# Patient Record
Sex: FEMALE | Race: WHITE | NOT HISPANIC OR LATINO | Employment: PART TIME | URBAN - METROPOLITAN AREA
[De-identification: names, ages, dates, MRNs, and addresses within clinical notes are randomized per-mention and may not be internally consistent; named-entity substitution may affect disease eponyms.]

---
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
## 2022-01-09 ENCOUNTER — HEALTH MAINTENANCE LETTER (OUTPATIENT)
Age: 22
End: 2022-01-09

## 2022-02-13 ENCOUNTER — OFFICE VISIT (OUTPATIENT)
Dept: FAMILY MEDICINE | Facility: CLINIC | Age: 22
End: 2022-02-13
Payer: COMMERCIAL

## 2022-02-13 VITALS
RESPIRATION RATE: 16 BRPM | DIASTOLIC BLOOD PRESSURE: 84 MMHG | HEART RATE: 119 BPM | OXYGEN SATURATION: 98 % | TEMPERATURE: 99.2 F | SYSTOLIC BLOOD PRESSURE: 118 MMHG

## 2022-02-13 DIAGNOSIS — Z11.52 ENCOUNTER FOR SCREENING FOR COVID-19: Primary | ICD-10-CM

## 2022-02-13 DIAGNOSIS — J02.0 STREP THROAT: ICD-10-CM

## 2022-02-13 DIAGNOSIS — R07.0 THROAT PAIN: ICD-10-CM

## 2022-02-13 DIAGNOSIS — G43.009 MIGRAINE WITHOUT AURA AND WITHOUT STATUS MIGRAINOSUS, NOT INTRACTABLE: ICD-10-CM

## 2022-02-13 LAB — DEPRECATED S PYO AG THROAT QL EIA: POSITIVE

## 2022-02-13 PROCEDURE — 87880 STREP A ASSAY W/OPTIC: CPT | Performed by: PHYSICIAN ASSISTANT

## 2022-02-13 PROCEDURE — U0003 INFECTIOUS AGENT DETECTION BY NUCLEIC ACID (DNA OR RNA); SEVERE ACUTE RESPIRATORY SYNDROME CORONAVIRUS 2 (SARS-COV-2) (CORONAVIRUS DISEASE [COVID-19]), AMPLIFIED PROBE TECHNIQUE, MAKING USE OF HIGH THROUGHPUT TECHNOLOGIES AS DESCRIBED BY CMS-2020-01-R: HCPCS | Performed by: PHYSICIAN ASSISTANT

## 2022-02-13 PROCEDURE — U0005 INFEC AGEN DETEC AMPLI PROBE: HCPCS | Performed by: PHYSICIAN ASSISTANT

## 2022-02-13 PROCEDURE — 99214 OFFICE O/P EST MOD 30 MIN: CPT | Mod: 25 | Performed by: PHYSICIAN ASSISTANT

## 2022-02-13 PROCEDURE — 96372 THER/PROPH/DIAG INJ SC/IM: CPT | Mod: 59 | Performed by: PHYSICIAN ASSISTANT

## 2022-02-13 RX ORDER — PENICILLIN V POTASSIUM 500 MG/1
500 TABLET, FILM COATED ORAL 2 TIMES DAILY
Qty: 20 TABLET | Refills: 0 | Status: SHIPPED | OUTPATIENT
Start: 2022-02-13 | End: 2022-02-13

## 2022-02-13 RX ORDER — KETOROLAC TROMETHAMINE 30 MG/ML
30 INJECTION, SOLUTION INTRAMUSCULAR; INTRAVENOUS ONCE
Status: COMPLETED | OUTPATIENT
Start: 2022-02-13 | End: 2022-02-13

## 2022-02-13 RX ORDER — DIPHENHYDRAMINE HYDROCHLORIDE 50 MG/ML
50 INJECTION INTRAMUSCULAR; INTRAVENOUS ONCE
Status: COMPLETED | OUTPATIENT
Start: 2022-02-13 | End: 2022-02-13

## 2022-02-13 RX ADMIN — KETOROLAC TROMETHAMINE 30 MG: 30 INJECTION, SOLUTION INTRAMUSCULAR; INTRAVENOUS at 17:24

## 2022-02-13 RX ADMIN — DIPHENHYDRAMINE HYDROCHLORIDE 50 MG: 50 INJECTION INTRAMUSCULAR; INTRAVENOUS at 17:25

## 2022-02-13 NOTE — PATIENT INSTRUCTIONS
Suggested increased rest increased fluids and bedside humidification  Over-the-counter Tylenol for comfort.  Follow packaging directions  Over-the-counter throat lozenges with benzocaine such as Cepacol may be used if indicated and is not a choking hazard based on age.  Follow packaging directions.  Do not overuse the benzocaine as it will dry the throat and make it uncomfortable.  Noncontagious after 24 hours on the antibiotic.  Change toothbrush out after 48 hours to avoid reinfecting the mouth.  Follow-up after completion of the antibiotic if any consultation or sequela.      Self-Care for Sore Throats  Sore throats happen for many reasons, such as colds, allergies, and infections caused by viruses or bacteria. In any case, your throat becomes red and sore. Your goal for self-care is to reduce your discomfort while giving your throat a chance to heal.    Moisten and soothe your throat  Tips include the following:    Try a sip of water first thing after waking up.    Keep your throat moist by drinking 6 or more glasses of clear liquids every day.    Run a cool-air humidifier in your room overnight.    Avoid cigarette smoke.     Suck on throat lozenges, cough drops, hard candy, ice chips, or frozen fruit-juice bars. Use the sugar-free versions if your diet or medical condition requires them.  Gargle to ease irritation  Gargling every hour or 2 can ease irritation. Try gargling with 1 of these solutions:    1/4 teaspoon of salt in 1/2 cup of warm water    An over-the-counter anesthetic gargle  Use medicine for more relief  Over-the-counter medicine can reduce sore throat symptoms. Ask your pharmacist if you have questions about which medicine to use:    Ease pain with anesthetic sprays. Aspirin or an aspirin substitute also helps. Remember, never give aspirin to anyone 18 or younger, or if you are already taking blood thinners.     For sore throats caused by allergies, try antihistamines to block the allergic  reaction.    Remember: unless a sore throat is caused by a bacterial infection, antibiotics won t help you.  Prevent future sore throats  Prevention tips include the following:    Stop smoking or reduce contact with secondhand smoke. Smoke irritates the tender throat lining.    Limit contact with pets and with allergy-causing substances, such as pollen and mold.    When you re around someone with a sore throat or cold, wash your hands often to keep viruses or bacteria from spreading.    Don t strain your vocal cords.  Call your healthcare provider  Contact your healthcare provider if you have:    A temperature over 101 F (38.3 C)    White spots on the throat    Great difficulty swallowing    Trouble breathing    A skin rash    Recent exposure to someone else with strep bacteria    Severe hoarseness and swollen glands in the neck or jaw   Date Last Reviewed: 8/1/2016 2000-2016 [employer]. [address] [place] [address]. All rights reserved. This information is not intended as a substitute for professional medical care. Always follow your healthcare professional's instructions.        Patient Education     Preventing Migraine Headaches: Medicines and Lifestyle Changes      Going to bed and getting up at the same time each day, including weekends, may help prevent migraines.   A migraine is a type of severe headache. Having a migraine can be very painful. But there are steps you can take to help prevent migraines.   Medicines to help prevent migraines     Your healthcare provider may prescribe certain medicines to help prevent migraines. These medicines may need to be taken daily. Or they may only need to be taken at times when you re likely to have a migraine.    Common medicines used to help prevent migraines include:  ? Triptans (serotonin receptor agonists)  ? Nonsteroidal anti-inflammatory drugs (such as ibuprofen, available  over-the-counter)  ? Beta-blockers  ? Anticonvulsants  ? Tricyclic antidepressants  ? Calcium channel blockers  ? Certain vitamins, minerals, and plant extracts  ? Botulinum toxin injection for certain chronic migraines   ? CGRP (calcitonin gene-related peptide) agnonists are being reviewed by the Food and Drug Administration (FDA)    Lifestyle changes for long-term prevention   Here are some suggestions:    Exercise. Regular exercise can help prevent migraines and improve your health. (If exercise triggers your migraines, talk to your healthcare provider.)    Keep regular habits. Don t skip or delay meals. Drink plenty of water. And go to bed and get up at about the same time each day. This includes weekends.    Try alternative treatments. These are treatments that don't involve the use of medicines or surgery. They may help relieve symptoms and prevent migraines. Some treatment options include biofeedback and acupuncture. Ask your healthcare provider to tell you more about these treatments if you have questions.    Limit caffeine. You may find that caffeine helps relieve pain during an attack. But too much caffeine can also trigger migraines. So, limit the amount of caffeine you consume.  [employer] last reviewed this educational content on 5/1/2018 2000-2021 [employer]. All rights reserved. This information is not intended as a substitute for professional medical care. Always follow your healthcare professional's instructions.           Patient Education     Preventing Migraine Headaches: Triggers  The first step in preventing migraines is to learn what triggers them. You may then be able to control your triggers to avoid or reduce the severity of your migraines.   Know your triggers  Be aware that you may have more than one trigger, and that some triggers may work together. Common migraine triggers include:     Food and nutrition. Skipping meals or not drinking enough water can trigger headaches.  So can certain foods, such as caffeine, chocolate, artificial sweeteners, monosodium glutamate (MSG), aged cheese, or sausage.    Alcohol. Red wine and other alcoholic beverages are common migraine triggers.    Chemicals. Scents, cleaning products, gasoline, glue, perfume, and paint can be triggers. So can tobacco smoke, including secondhand smoke.    Emotions. Stress can trigger headaches or make them worse once they start.    Sleep disruption. Staying up late, sleeping late, and traveling across time zones can disrupt your sleep cycle, triggering headaches.    Hormones. Many women notice that migraines tend to happen at a certain point in their menstrual cycle. Birth control pills or hormone replacement therapy may also trigger migraines.    Environment and weather. Air travel, changes in altitude, air pressure changes, hot sun, or bright or flashing lights can be triggers.    Medicine overuse. Frequent use of pain medicines for headache pain can also cause a headache. This may also be called rebound headache.  Control your triggers  These are some of the things you can do to try to control triggers:    Avoid triggers if you can. For example, stay clear of alcohol and foods that trigger your headaches. Use unscented household products. Keep regular sleep habits. Manage stress to help control emotional triggers.    Change your behavior at times when triggers can't be avoided. For example, make sure to get enough rest and drink plenty of water while you're traveling. Make sure to carry a hat, sunglasses, and your medicines. Be alert for migraine symptoms, so you can treat a migraine early if it happens.  [employer] last reviewed this educational content on 5/1/2018 2000-2021 [employer]. All rights reserved. This information is not intended as a substitute for professional medical care. Always follow your healthcare professional's instructions.           Patient Education     Migraine Headache: Stages  "and Treatment    A migraine headache tends to progress in stages. Learning these stages can help you better understand what is happening. Then you can learn ways to reduce pain and relieve other symptoms. Methods for relieving your symptoms include self-care and medicines.   Migraine stages  Migraines tend to progress through 4 stages. Many people don't have all stages, and stages may differ with each headache:     Prodrome. A few hours to a day or so before the headache, you may feel tired, (yawning many times), uneasy, or sams. You may also feel bloated or crave certain foods.    Aura. Up to an hour before the headache starts, some migraine sufferers experience aura--flashing lights, blind spots, other vision problems, confusion, difficulty speaking, or other neurologic symptoms.    Headache. Moderate to severe pain affects one side of the head and then can spread to both sides, often along with nausea. You may be highly sensitive to light, sound, and odors. Vomiting or diarrhea may also happen. This stage lasts 4 to 72 hours.    Postdrome. After your headache ends, you may feel tired, achy, and \"washed out.\" This may last for a day or so.  Self-care during a migraine  Here is what you can do:    Use a cold compress. Wrap a thin cloth around a cold pack, a cold can of soda, or a bag of frozen vegetables. Apply this to your temple or other pain site.    Drink fluids. If nausea makes it hard to drink, try sucking on ice.    Rest. If possible, lie down. Try not to bend over, as this may increase your pain. Sometimes laying in a dark quiet room can help the migraine from being aggravated.      Try caffeine. Some people find that drinking fluids with caffeine, such as coffee or tea, helps to lessen migraine pain.  Using medicines  Work with your healthcare provider to find the right medicines for you. Medicines for migraine may relieve pain (analgesics), relieve nausea, or attack the migraine's root causes " (migraine-specific medicines).   Rebound headache  Taking analgesics each day, or even several times a week, may lead to more frequent and severe headaches. These are called rebound headaches. If you think you're having rebound headaches, tell your healthcare provider. He or she can help you safely decrease your medicine. Rebound caffeine withdrawal headaches can also happen. Certain medicines are addictive and can cause rebound headaches when discontinued abruptly.   [employer] last reviewed this educational content on 5/1/2018 2000-2021 [employer]. All rights reserved. This information is not intended as a substitute for professional medical care. Always follow your healthcare professional's instructions.           Patient Education   About Migraine Headaches  What is a migraine headache?  A migraine is a very painful type of headache. It may last a few hours or days. During a migraine, you may have vision problems and feel sick to your stomach.  Migraines are three times more common in women than in men. Once they start, you may get them for the rest of your life. They may occur less often as you age.  What causes it?  We don't know the exact cause, but many things can trigger a migraine. These include:    Stress and anxiety    Lack of food or sleep    Foods and drinks that contain tyramine, such as:  ? Red steven and some beers  ? Aged cheeses (like cheddar or blue cheese)  ? Yeast  ? Aged, dried or cured meats  ? Fermented foods like sauerkraut, soy sauce, miso and linda chi    Too much light    Chemicals (gasoline, cleaning products, perfume, glue, etc.)    Weather changes    Certain medicines    Hormone changes (in women).  What are symptoms?  Some people can tell when they're about to have a migraine. They may see flashing lights or zigzag lines in front of their eyes. Or they may lose their vision for a short time.  With a migraine, you may:    Feel pain or pulsing on one side of the head. For  some people, the entire head hurts.    Be very sensitive to light and sound.    Feel nauseated (sick to your stomach) and vomit (throw up).  How is it treated?  Your care team may suggest medicine to prevent or relieve your symptoms. Once you start having migraines, you may also need medicine to keep them from getting worse.   Take your medicine at the first sign of a migraine. It may take several tries to find a medicine that works for you.   When a migraine comes:    Lie down in a quiet, dark room. Try not to bend over, as this may cause more pain.    Put a cold pack on your head. Try a bag of frozen vegetables, wrapped with a thin cloth.    Drink extra fluids. If you can't drink, suck on ice chips.  How can I prevent migraines?  It will help to keep a migraine diary. By keeping a diary, you may find the cause of your headaches. Once you know the cause, you can take steps to prevent migraines in the future.  It also helps to live a healthy lifestyle. This means:    Get regular exercise. (If exercise triggers your headaches, tell your doctor.)    Drink plenty of water.    Eat healthy meals at regular times.    Try to go to bed and get up and regular times.    Don't smoke. Avoid second-hand smoke.    Avoid caffeine. Coffee, tea and soda may help a migraine. But if you drink them too often, they can cause migraines.    Find ways to relax, have fun and reduce stress in your life.    Try complementary therapies (yoga, acupuncture, massage, biofeedback, etc.).  When should I call my clinic?  Call your clinic at once if you have new or unusual symptoms, such as:     Pain that gets worse or lasts more than 24 hours.    Slurred speech or problems talking.    A weak arm or leg that you can't move normally.    A fever over 100 F (37.8 C), taken under the tongue.    Stiff neck.    Vomiting (throwing up) for several hours.  For more information about migraines  Contact the [employer] [place] [employer] Headache Education (ACHE) at  2-156-118-3614 or www.headaches.org.  Local providers of complementary therapies  These services may not be covered by insurance. Check your insurance plan.  [facility]  [phone]   Includes pain education, behavioral therapy,   trigger point injections and more.  [facility]   [phone]   Includes acupuncture, massage, myofascial release.  [facility]  [phone]  [email or web address]  Includes mindfulness, meditation, yoga.  Community Education     Ideal: [email or web address]: [email or web address]  Look for programs on yoga, mindfulness, etc.  [facility]   [phone]  www[email or web address]  Includes mindfulness, yoga, body-mind skills.  For informational purposes only. Not to replace the advice of your health care provider.   Copyright   2011 [facility]. All rights reserved. [employer] 888811 - 11/15.

## 2022-02-13 NOTE — PROGRESS NOTES
Patient presents with:  Pharyngitis: x2d  Migraine: x2wks      Clinical Decision Making:  Strep test was obtained and was positive.  COVID-19 screening test is pending.  Symptomatic care was gone over.  Patient and mother requested pen G IM treatment for the strep throat based on the last treatment for strep throat at the emergency room in September 2021.  Patient was also treated for Benadryl 50 mg IM and Toradol 30 mg IM for treatment for Migraine. I had a conversation with mother and patient stating that the headache can also come from a con commitment strep infection.  Expected course of resolution and indication for return was gone over and questions were answered to patient/parent's satisfaction before discharge.        ICD-10-CM    1. Encounter for screening for COVID-19  Z11.52 Symptomatic; Yes; 2/12/2022 COVID-19 Virus (Coronavirus) by PCR Nose   2. Throat pain  R07.0 Streptococcus A Rapid Screen w/Reflex to PCR - Clinic Collect   3. Strep throat  J02.0 penicillin G benzathine (BICILLIN L-A) injection 1.2 Million Units     DISCONTINUED: penicillin V (VEETID) 500 MG tablet   4. Migraine without aura and without status migrainosus, not intractable  G43.009 diphenhydrAMINE (BENADRYL) injection 50 mg     ketorolac (TORADOL) injection 30 mg       Patient Instructions       Suggested increased rest increased fluids and bedside humidification  Over-the-counter Tylenol for comfort.  Follow packaging directions  Over-the-counter throat lozenges with benzocaine such as Cepacol may be used if indicated and is not a choking hazard based on age.  Follow packaging directions.  Do not overuse the benzocaine as it will dry the throat and make it uncomfortable.  Noncontagious after 24 hours on the antibiotic.  Change toothbrush out after 48 hours to avoid reinfecting the mouth.  Follow-up after completion of the antibiotic if any consultation or sequela.      Self-Care for Sore Throats  Sore throats happen for many reasons,  such as colds, allergies, and infections caused by viruses or bacteria. In any case, your throat becomes red and sore. Your goal for self-care is to reduce your discomfort while giving your throat a chance to heal.    Moisten and soothe your throat  Tips include the following:    Try a sip of water first thing after waking up.    Keep your throat moist by drinking 6 or more glasses of clear liquids every day.    Run a cool-air humidifier in your room overnight.    Avoid cigarette smoke.     Suck on throat lozenges, cough drops, hard candy, ice chips, or frozen fruit-juice bars. Use the sugar-free versions if your diet or medical condition requires them.  Gargle to ease irritation  Gargling every hour or 2 can ease irritation. Try gargling with 1 of these solutions:    1/4 teaspoon of salt in 1/2 cup of warm water    An over-the-counter anesthetic gargle  Use medicine for more relief  Over-the-counter medicine can reduce sore throat symptoms. Ask your pharmacist if you have questions about which medicine to use:    Ease pain with anesthetic sprays. Aspirin or an aspirin substitute also helps. Remember, never give aspirin to anyone 18 or younger, or if you are already taking blood thinners.     For sore throats caused by allergies, try antihistamines to block the allergic reaction.    Remember: unless a sore throat is caused by a bacterial infection, antibiotics won t help you.  Prevent future sore throats  Prevention tips include the following:    Stop smoking or reduce contact with secondhand smoke. Smoke irritates the tender throat lining.    Limit contact with pets and with allergy-causing substances, such as pollen and mold.    When you re around someone with a sore throat or cold, wash your hands often to keep viruses or bacteria from spreading.    Don t strain your vocal cords.  Call your healthcare provider  Contact your healthcare provider if you have:    A temperature over 101 F (38.3 C)    White spots on  the throat    Great difficulty swallowing    Trouble breathing    A skin rash    Recent exposure to someone else with strep bacteria    Severe hoarseness and swollen glands in the neck or jaw   Date Last Reviewed: 8/1/2016 2000-2016 [employer] [address] [place] [address], [place] [address]. All rights reserved. This information is not intended as a substitute for professional medical care. Always follow your healthcare professional's instructions.        Patient Education     Preventing Migraine Headaches: Medicines and Lifestyle Changes      Going to bed and getting up at the same time each day, including weekends, may help prevent migraines.   A migraine is a type of severe headache. Having a migraine can be very painful. But there are steps you can take to help prevent migraines.   Medicines to help prevent migraines     Your healthcare provider may prescribe certain medicines to help prevent migraines. These medicines may need to be taken daily. Or they may only need to be taken at times when you re likely to have a migraine.    Common medicines used to help prevent migraines include:  ? Triptans (serotonin receptor agonists)  ? Nonsteroidal anti-inflammatory drugs (such as ibuprofen, available over-the-counter)  ? Beta-blockers  ? Anticonvulsants  ? Tricyclic antidepressants  ? Calcium channel blockers  ? Certain vitamins, minerals, and plant extracts  ? Botulinum toxin injection for certain chronic migraines   ? CGRP (calcitonin gene-related peptide) agnonists are being reviewed by the Food and Drug Administration (FDA)    Lifestyle changes for long-term prevention   Here are some suggestions:    Exercise. Regular exercise can help prevent migraines and improve your health. (If exercise triggers your migraines, talk to your healthcare provider.)    Keep regular habits. Don t skip or delay meals. Drink plenty of water. And go to bed and get up at about the same time each day. This includes  weekends.    Try alternative treatments. These are treatments that don't involve the use of medicines or surgery. They may help relieve symptoms and prevent migraines. Some treatment options include biofeedback and acupuncture. Ask your healthcare provider to tell you more about these treatments if you have questions.    Limit caffeine. You may find that caffeine helps relieve pain during an attack. But too much caffeine can also trigger migraines. So, limit the amount of caffeine you consume.  [employer] last reviewed this educational content on 5/1/2018 2000-2021 [employer]. All rights reserved. This information is not intended as a substitute for professional medical care. Always follow your healthcare professional's instructions.           Patient Education     Preventing Migraine Headaches: Triggers  The first step in preventing migraines is to learn what triggers them. You may then be able to control your triggers to avoid or reduce the severity of your migraines.   Know your triggers  Be aware that you may have more than one trigger, and that some triggers may work together. Common migraine triggers include:     Food and nutrition. Skipping meals or not drinking enough water can trigger headaches. So can certain foods, such as caffeine, chocolate, artificial sweeteners, monosodium glutamate (MSG), aged cheese, or sausage.    Alcohol. Red wine and other alcoholic beverages are common migraine triggers.    Chemicals. Scents, cleaning products, gasoline, glue, perfume, and paint can be triggers. So can tobacco smoke, including secondhand smoke.    Emotions. Stress can trigger headaches or make them worse once they start.    Sleep disruption. Staying up late, sleeping late, and traveling across time zones can disrupt your sleep cycle, triggering headaches.    Hormones. Many women notice that migraines tend to happen at a certain point in their menstrual cycle. Birth control pills or hormone  replacement therapy may also trigger migraines.    Environment and weather. Air travel, changes in altitude, air pressure changes, hot sun, or bright or flashing lights can be triggers.    Medicine overuse. Frequent use of pain medicines for headache pain can also cause a headache. This may also be called rebound headache.  Control your triggers  These are some of the things you can do to try to control triggers:    Avoid triggers if you can. For example, stay clear of alcohol and foods that trigger your headaches. Use unscented household products. Keep regular sleep habits. Manage stress to help control emotional triggers.    Change your behavior at times when triggers can't be avoided. For example, make sure to get enough rest and drink plenty of water while you're traveling. Make sure to carry a hat, sunglasses, and your medicines. Be alert for migraine symptoms, so you can treat a migraine early if it happens.  [employer] last reviewed this educational content on 5/1/2018 2000-2021 [employer]. All rights reserved. This information is not intended as a substitute for professional medical care. Always follow your healthcare professional's instructions.           Patient Education     Migraine Headache: Stages and Treatment    A migraine headache tends to progress in stages. Learning these stages can help you better understand what is happening. Then you can learn ways to reduce pain and relieve other symptoms. Methods for relieving your symptoms include self-care and medicines.   Migraine stages  Migraines tend to progress through 4 stages. Many people don't have all stages, and stages may differ with each headache:     Prodrome. A few hours to a day or so before the headache, you may feel tired, (yawning many times), uneasy, or sams. You may also feel bloated or crave certain foods.    Aura. Up to an hour before the headache starts, some migraine sufferers experience aura--flashing lights, blind  "spots, other vision problems, confusion, difficulty speaking, or other neurologic symptoms.    Headache. Moderate to severe pain affects one side of the head and then can spread to both sides, often along with nausea. You may be highly sensitive to light, sound, and odors. Vomiting or diarrhea may also happen. This stage lasts 4 to 72 hours.    Postdrome. After your headache ends, you may feel tired, achy, and \"washed out.\" This may last for a day or so.  Self-care during a migraine  Here is what you can do:    Use a cold compress. Wrap a thin cloth around a cold pack, a cold can of soda, or a bag of frozen vegetables. Apply this to your temple or other pain site.    Drink fluids. If nausea makes it hard to drink, try sucking on ice.    Rest. If possible, lie down. Try not to bend over, as this may increase your pain. Sometimes laying in a dark quiet room can help the migraine from being aggravated.      Try caffeine. Some people find that drinking fluids with caffeine, such as coffee or tea, helps to lessen migraine pain.  Using medicines  Work with your healthcare provider to find the right medicines for you. Medicines for migraine may relieve pain (analgesics), relieve nausea, or attack the migraine's root causes (migraine-specific medicines).   Rebound headache  Taking analgesics each day, or even several times a week, may lead to more frequent and severe headaches. These are called rebound headaches. If you think you're having rebound headaches, tell your healthcare provider. He or she can help you safely decrease your medicine. Rebound caffeine withdrawal headaches can also happen. Certain medicines are addictive and can cause rebound headaches when discontinued abruptly.   [employer] last reviewed this educational content on 5/1/2018 2000-2021 [employer]. All rights reserved. This information is not intended as a substitute for professional medical care. Always follow your healthcare " professional's instructions.           Patient Education   About Migraine Headaches  What is a migraine headache?  A migraine is a very painful type of headache. It may last a few hours or days. During a migraine, you may have vision problems and feel sick to your stomach.  Migraines are three times more common in women than in men. Once they start, you may get them for the rest of your life. They may occur less often as you age.  What causes it?  We don't know the exact cause, but many things can trigger a migraine. These include:    Stress and anxiety    Lack of food or sleep    Foods and drinks that contain tyramine, such as:  ? Red steven and some beers  ? Aged cheeses (like cheddar or blue cheese)  ? Yeast  ? Aged, dried or cured meats  ? Fermented foods like sauerkraut, soy sauce, miso and linda chi    Too much light    Chemicals (gasoline, cleaning products, perfume, glue, etc.)    Weather changes    Certain medicines    Hormone changes (in women).  What are symptoms?  Some people can tell when they're about to have a migraine. They may see flashing lights or zigzag lines in front of their eyes. Or they may lose their vision for a short time.  With a migraine, you may:    Feel pain or pulsing on one side of the head. For some people, the entire head hurts.    Be very sensitive to light and sound.    Feel nauseated (sick to your stomach) and vomit (throw up).  How is it treated?  Your care team may suggest medicine to prevent or relieve your symptoms. Once you start having migraines, you may also need medicine to keep them from getting worse.   Take your medicine at the first sign of a migraine. It may take several tries to find a medicine that works for you.   When a migraine comes:    Lie down in a quiet, dark room. Try not to bend over, as this may cause more pain.    Put a cold pack on your head. Try a bag of frozen vegetables, wrapped with a thin cloth.    Drink extra fluids. If you can't drink, suck on ice  chips.  How can I prevent migraines?  It will help to keep a migraine diary. By keeping a diary, you may find the cause of your headaches. Once you know the cause, you can take steps to prevent migraines in the future.  It also helps to live a healthy lifestyle. This means:    Get regular exercise. (If exercise triggers your headaches, tell your doctor.)    Drink plenty of water.    Eat healthy meals at regular times.    Try to go to bed and get up and regular times.    Don't smoke. Avoid second-hand smoke.    Avoid caffeine. Coffee, tea and soda may help a migraine. But if you drink them too often, they can cause migraines.    Find ways to relax, have fun and reduce stress in your life.    Try complementary therapies (yoga, acupuncture, massage, biofeedback, etc.).  When should I call my clinic?  Call your clinic at once if you have new or unusual symptoms, such as:     Pain that gets worse or lasts more than 24 hours.    Slurred speech or problems talking.    A weak arm or leg that you can't move normally.    A fever over 100 F (37.8 C), taken under the tongue.    Stiff neck.    Vomiting (throwing up) for several hours.  For more information about migraines  Contact the [employer] [place] [employer] Education (ACHE) at 1-152.879.7988 or www.headaches.org.  Local providers of complementary therapies  These services may not be covered by insurance. Check your insurance plan.  [facility]  [phone]   Includes pain education, behavioral therapy,   trigger point injections and more.  [facility]   [phone]   Includes acupuncture, massage, myofascial release.  [facility]  [phone]  [email or web address]  Includes mindfulness, meditation, yoga.  Community Education     [place]: [email or web address]: [email or web address]  Look for programs on yoga, mindfulness, etc.  [facility]   [phone]  [email or web address]  Includes mindfulness, yoga, body-mind skills.  For informational purposes only. Not to replace the advice of your health care provider.   Copyright   2011 [facility]. All rights reserved. [employer] 251918 - 11/15.           HPI:  Mary Bueno is a 21 year old female who presents today accompanied by mother with 2 concerns.  First concern is that she has had a migraine that has been intermittent over the last 2 weeks.  This is her usual customary migraine she does not have prodrome flashing lights scintillating scotoma or light sensitivity but she does have some sound sensitivity.  No overt vomiting or diarrhea but has had some nauseousness.  Patient has pain that is a 7 out of 10 and constant.  Denies balance deficits weakness but has had some fatigue.     Second concern is that she has sore throat odynophagia over the last 2 days.  She has not had fever or known sick contacts for strep or for Covid.  Other and child are requesting Covid screening.    History obtained from mother, chart review and the patient.    Problem List:  2021-06: Breakthrough bleeding on Nexplanon  2021-06: Endometrial thickening on ultrasound  2020-09: Nexplanon insertion  2019-05: Migraine without aura and without status migrainosus, not   intractable  2017-05: Increased insulin level  2016-07: Current mild episode of major depressive disorder without   prior episode (H)  2016-07: Gastroesophageal reflux disease, esophagitis presence not   specified  2016-07: Depression with anxiety  2014-05: Obesity  2013-09: Adjustment disorder with mixed anxiety and depressed mood  2013-05: Health Care Home  2012-09: Intermittent asthma  2012-06: Weight disorder  2011-09: Recurrent UTI      Past Medical History:   Diagnosis Date     Migraines      Uncomplicated asthma        Social History     Tobacco Use     Smoking status: Never Smoker     Smokeless tobacco: Never  Used   Substance Use Topics     Alcohol use: No     Alcohol/week: 0.0 standard drinks       Review of Systems  As above in HPI otherwise negative.    Vitals:    02/13/22 1631 02/13/22 1717   BP: 118/84    Pulse: (!) 125 119   Resp: 16    Temp: 99.2  F (37.3  C)    TempSrc: Oral    SpO2: 98%        General: Patient is resting comfortably no acute distress is afebrile  HEENT: Head is normocephalic atraumatic   eyes are PERRL EOMI sclera anicteric   TMs are clear bilaterally  Throat is with mild pharyngeal wall erythema and 3+ tonsils uvula is midline.  No cervical lymphadenopathy present  LUNGS: Clear to auscultation bilaterally  HEART: Regular rate and rhythm  Skin: Without rash non-diaphoretic  Neuro: Annual nerves II through XII are grossly intact.  There is both direct and consensual stimulation to light.  No noted nystagmus.  Romberg is negative.  Finger-to-nose testing is intact patient has good strength 5-5 equal bilaterally in upper lower extremities.  She is alert and oriented x 3.     Physical Exam      Labs:  Results for orders placed or performed in visit on 02/13/22   Streptococcus A Rapid Screen w/Reflex to PCR - Clinic Collect     Status: Abnormal    Specimen: Throat; Swab   Result Value Ref Range    Group A Strep antigen Positive (A) Negative     COVID-19 is pending.    At the end of the encounter, I discussed results, diagnosis, medications. Discussed red flags for immediate return to clinic/ER, as well as indications for follow up if no improvement. Patient understood and agreed to plan. Patient was stable for discharge.

## 2022-02-14 LAB — SARS-COV-2 RNA RESP QL NAA+PROBE: NEGATIVE

## 2022-02-15 ENCOUNTER — OFFICE VISIT (OUTPATIENT)
Dept: FAMILY MEDICINE | Facility: CLINIC | Age: 22
End: 2022-02-15
Payer: COMMERCIAL

## 2022-02-15 VITALS
HEART RATE: 110 BPM | SYSTOLIC BLOOD PRESSURE: 120 MMHG | OXYGEN SATURATION: 99 % | BODY MASS INDEX: 41.65 KG/M2 | WEIGHT: 250 LBS | DIASTOLIC BLOOD PRESSURE: 80 MMHG | TEMPERATURE: 99.7 F | HEIGHT: 65 IN

## 2022-02-15 DIAGNOSIS — L70.8 OTHER ACNE: ICD-10-CM

## 2022-02-15 DIAGNOSIS — G43.009 MIGRAINE WITHOUT AURA AND WITHOUT STATUS MIGRAINOSUS, NOT INTRACTABLE: ICD-10-CM

## 2022-02-15 DIAGNOSIS — K21.9 GASTROESOPHAGEAL REFLUX DISEASE WITHOUT ESOPHAGITIS: ICD-10-CM

## 2022-02-15 DIAGNOSIS — J35.1 TONSILLAR HYPERTROPHY: ICD-10-CM

## 2022-02-15 DIAGNOSIS — Z11.3 ROUTINE SCREENING FOR STI (SEXUALLY TRANSMITTED INFECTION): Primary | ICD-10-CM

## 2022-02-15 DIAGNOSIS — Z11.59 NEED FOR HEPATITIS C SCREENING TEST: ICD-10-CM

## 2022-02-15 DIAGNOSIS — B37.31 YEAST INFECTION OF THE VAGINA: ICD-10-CM

## 2022-02-15 DIAGNOSIS — E55.9 VITAMIN D DEFICIENCY: ICD-10-CM

## 2022-02-15 DIAGNOSIS — Z11.4 SCREENING FOR HIV (HUMAN IMMUNODEFICIENCY VIRUS): ICD-10-CM

## 2022-02-15 DIAGNOSIS — L85.8 KERATOSIS PILARIS: ICD-10-CM

## 2022-02-15 LAB
ANION GAP SERPL CALCULATED.3IONS-SCNC: 3 MMOL/L (ref 3–14)
BUN SERPL-MCNC: 8 MG/DL (ref 7–30)
CALCIUM SERPL-MCNC: 9.2 MG/DL (ref 8.5–10.1)
CHLORIDE BLD-SCNC: 105 MMOL/L (ref 96–110)
CO2 SERPL-SCNC: 30 MMOL/L (ref 20–32)
CREAT SERPL-MCNC: 0.5 MG/DL (ref 0.5–1)
GFR SERPL CREATININE-BSD FRML MDRD: >90 ML/MIN/1.73M2
GLUCOSE BLD-MCNC: 99 MG/DL (ref 70–99)
POTASSIUM BLD-SCNC: 3.8 MMOL/L (ref 3.4–5.3)
SODIUM SERPL-SCNC: 138 MMOL/L (ref 133–144)

## 2022-02-15 PROCEDURE — 86780 TREPONEMA PALLIDUM: CPT | Performed by: FAMILY MEDICINE

## 2022-02-15 PROCEDURE — 87389 HIV-1 AG W/HIV-1&-2 AB AG IA: CPT | Performed by: FAMILY MEDICINE

## 2022-02-15 PROCEDURE — 80048 BASIC METABOLIC PNL TOTAL CA: CPT | Performed by: FAMILY MEDICINE

## 2022-02-15 PROCEDURE — 99214 OFFICE O/P EST MOD 30 MIN: CPT | Performed by: FAMILY MEDICINE

## 2022-02-15 PROCEDURE — 82306 VITAMIN D 25 HYDROXY: CPT | Performed by: FAMILY MEDICINE

## 2022-02-15 PROCEDURE — 86803 HEPATITIS C AB TEST: CPT | Performed by: FAMILY MEDICINE

## 2022-02-15 PROCEDURE — 87591 N.GONORRHOEAE DNA AMP PROB: CPT | Performed by: FAMILY MEDICINE

## 2022-02-15 PROCEDURE — 87491 CHLMYD TRACH DNA AMP PROBE: CPT | Performed by: FAMILY MEDICINE

## 2022-02-15 PROCEDURE — 36415 COLL VENOUS BLD VENIPUNCTURE: CPT | Performed by: FAMILY MEDICINE

## 2022-02-15 RX ORDER — FLUCONAZOLE 150 MG/1
150 TABLET ORAL
Qty: 3 TABLET | Refills: 3 | Status: SHIPPED | OUTPATIENT
Start: 2022-02-15 | End: 2022-05-03

## 2022-02-15 RX ORDER — TRIAMCINOLONE ACETONIDE 1 MG/G
CREAM TOPICAL 2 TIMES DAILY
Qty: 30 G | Refills: 3 | Status: SHIPPED | OUTPATIENT
Start: 2022-02-15 | End: 2022-12-30

## 2022-02-15 RX ORDER — HYDROMORPHONE HYDROCHLORIDE 2 MG/1
2 TABLET ORAL EVERY 6 HOURS PRN
Qty: 5 TABLET | Refills: 0 | Status: SHIPPED | OUTPATIENT
Start: 2022-02-15 | End: 2022-02-17

## 2022-02-15 RX ORDER — PANTOPRAZOLE SODIUM 40 MG/1
40 TABLET, DELAYED RELEASE ORAL DAILY
Qty: 90 TABLET | Refills: 3 | Status: SHIPPED | OUTPATIENT
Start: 2022-02-15 | End: 2023-02-09

## 2022-02-15 RX ORDER — CLINDAMYCIN AND BENZOYL PEROXIDE 10; 50 MG/G; MG/G
GEL TOPICAL 2 TIMES DAILY
Qty: 35 G | Refills: 3 | Status: SHIPPED | OUTPATIENT
Start: 2022-02-15 | End: 2022-09-20

## 2022-02-15 ASSESSMENT — MIFFLIN-ST. JEOR: SCORE: 1909.87

## 2022-02-15 NOTE — PROGRESS NOTES
Assessment & Plan     Routine screening for STI (sexually transmitted infection)  She requests screening so we will screen her today  - Chlamydia trachomatis PCR - Clinic Collect  - Neisseria gonorrhoeae PCR - Clinic Collect  - HIV Antigen Antibody Combo; Future  - Hepatitis C Screen Reflex to HCV RNA Quant and Genotype; Future  - Treponema Abs w Reflex to RPR and Titer; Future  - Treponema Abs w Reflex to RPR and Titer  - Hepatitis C Screen Reflex to HCV RNA Quant and Genotype  - HIV Antigen Antibody Combo    Screening for HIV (human immunodeficiency virus)  Due this decade  - HIV Antigen Antibody Combo; Future  - HIV Antigen Antibody Combo    Need for hepatitis C screening test  Due once  - Hepatitis C Screen Reflex to HCV RNA Quant and Genotype; Future  - Hepatitis C Screen Reflex to HCV RNA Quant and Genotype    Tonsillar hypertrophy  She would like to consult to see if she can have a tonsillectomy.  She does have very large tonsils and she believes she snores at night.  - Otolaryngology Referral; Future    Vitamin D deficiency  History of it in the past so will check this level  - Vitamin D Deficiency; Future  - Vitamin D Deficiency    Other acne  She is going to use this on her acne  - clindamycin-benzoyl peroxide (BENZACLIN) 1-5 % external gel; Apply topically 2 times daily    Yeast infection of the vagina  Will send in Diflucan for her  - fluconazole (DIFLUCAN) 150 MG tablet; Take 1 tablet (150 mg) by mouth every 72 hours    Keratosis pilaris  We will work on the keratosis pilaris also  - triamcinolone (KENALOG) 0.1 % external cream; Apply topically 2 times daily To thighs and flank s    Gastroesophageal reflux disease without esophagitis  She is starting to have a gastric pain again so we will restart the Protonix  - pantoprazole (PROTONIX) 40 MG EC tablet; Take 1 tablet (40 mg) by mouth daily  - Basic metabolic panel  (Ca, Cl, CO2, Creat, Gluc, K, Na, BUN); Future  - Basic metabolic panel  (Ca, Cl, CO2,  "Creat, Gluc, K, Na, BUN)    Migraine without aura and without status migrainosus, not intractable  We had a lengthy discussion over the use of narcotics for relief of headache.  She is still on her Emgality and doing some of the other things and her headaches are much better but every now and then she has one that nothing really helps.  Her mother who recently had surgery had 1 dose of Dilaudid left and she tried it in her headache resolved she admits that she should not do this and share medication but she requests a small amount just to have on hand when all the other things are not working.  I also advised her to follow-up with her neurologist  - HYDROmorphone (DILAUDID) 2 MG tablet; Take 1 tablet (2 mg) by mouth every 6 hours as needed for pain (if headache is intractable)         BMI:   Estimated body mass index is 41.6 kg/m  as calculated from the following:    Height as of this encounter: 1.651 m (5' 5\").    Weight as of this encounter: 113.4 kg (250 lb).   Weight management plan: Discussed healthy diet and exercise guidelines    Regular exercise    No follow-ups on file.    Anabel Silver MD  [facility] RUPINDER Feliciano is a 21 year old who presents for the following health issues:     HPI   Wt Readings from Last 5 Encounters:   02/15/22 113.4 kg (250 lb) (>99 %, Z= 2.49)*   08/09/21 105 kg (231 lb 6.4 oz) (99 %, Z= 2.32)*   08/05/21 103 kg (227 lb) (99 %, Z= 2.27)*   07/19/21 103 kg (227 lb) (99 %, Z= 2.27)*   07/06/21 103 kg (227 lb) (99 %, Z= 2.27)*     * Growth percentiles are based on CDC (Girls, 2-20 Years) data.          Chief Complaint   Patient presents with     Recheck Medication     Pantoprazole is not helping as it used to with taking only 20mg daily.     Derm Problem     body acne is increasing over the last couple months.      She has the migraines and they are getting worse for the last few months   She did try a dilaudid and it worked well   She had been given " "toradol and it did not help much continues to take her Emgality.  She would like to be checked for routine STI as she currently is not sexually active she also requests a vitamin D check she has been low in the past she was diagnosed with strep she would like to be referred to ENT to consider removal.  She is already starting to have some itchiness from the antibiotic so requested Diflucan.  She had been on 20 mg of pantoprazole and she is starting to have more of the stomach pain return.    She has acne on the face dose well with oksana/clinda but the keratosis does not help it         Review of Systems   Constitutional, HEENT, cardiovascular, pulmonary, gi and gu systems are negative, except as otherwise noted.      Objective    /80   Pulse 110   Temp 99.7  F (37.6  C) (Tympanic)   Ht 1.651 m (5' 5\")   Wt 113.4 kg (250 lb)   SpO2 99%   BMI 41.60 kg/m    Body mass index is 41.6 kg/m .  Physical Exam   GENERAL: healthy, alert and no distress  EYES: Eyes grossly normal to inspection, PERRL and conjunctivae and sclerae normal  NECK: no adenopathy, no asymmetry, masses, or scars and thyroid normal to palpation  SKIN: no suspicious lesions or rashes, acne midface  and keratosis pilaris back of arms and anterior thighs     Results for orders placed or performed in visit on 02/15/22   Basic metabolic panel  (Ca, Cl, CO2, Creat, Gluc, K, Na, BUN)     Status: Normal   Result Value Ref Range    Sodium 138 133 - 144 mmol/L    Potassium 3.8 3.4 - 5.3 mmol/L    Chloride 105 96 - 110 mmol/L    Carbon Dioxide (CO2) 30 20 - 32 mmol/L    Anion Gap 3 3 - 14 mmol/L    Urea Nitrogen 8 7 - 30 mg/dL    Creatinine 0.50 0.50 - 1.00 mg/dL    Calcium 9.2 8.5 - 10.1 mg/dL    Glucose 99 70 - 99 mg/dL    GFR Estimate >90 >60 mL/min/1.73m2   Vitamin D Deficiency     Status: Abnormal   Result Value Ref Range    Vitamin D, Total (25-Hydroxy) 15 (L) 20 - 75 ug/L    Narrative    Season, race, dietary intake, and treatment affect the " concentration of 25-hydroxy-Vitamin D. Values may decrease during winter months and increase during summer months. Values 20-29 ug/L may indicate Vitamin D insufficiency and values <20 ug/L may indicate Vitamin D deficiency.    Vitamin D determination is routinely performed by an immunoassay specific for 25 hydroxyvitamin D3.  If an individual is on vitamin D2(ergocalciferol) supplementation, please specify 25 OH vitamin D2 and D3 level determination by LCMSMS test [id number].     Treponema Abs w Reflex to RPR and Titer     Status: Normal   Result Value Ref Range    Treponema Antibody Total Nonreactive Nonreactive   Hepatitis C Screen Reflex to HCV RNA Quant and Genotype     Status: Normal   Result Value Ref Range    Hepatitis C Antibody Nonreactive Nonreactive    Narrative    Assay performance characteristics have not been established for newborns, infants, and children.   HIV Antigen Antibody Combo     Status: Normal   Result Value Ref Range    HIV Antigen Antibody Combo Nonreactive Nonreactive   Chlamydia trachomatis PCR - Clinic Collect     Status: Normal    Specimen: Urine, Voided   Result Value Ref Range    Chlamydia trachomatis Negative Negative   Neisseria gonorrhoeae PCR - Clinic Collect     Status: Normal    Specimen: Urine, Voided   Result Value Ref Range    Neisseria gonorrhoeae Negative Negative       Anabel Silver M.D.

## 2022-02-16 ENCOUNTER — MYC MEDICAL ADVICE (OUTPATIENT)
Dept: FAMILY MEDICINE | Facility: CLINIC | Age: 22
End: 2022-02-16
Payer: COMMERCIAL

## 2022-02-16 LAB
C TRACH DNA SPEC QL NAA+PROBE: NEGATIVE
DEPRECATED CALCIDIOL+CALCIFEROL SERPL-MC: 15 UG/L (ref 20–75)
HCV AB SERPL QL IA: NONREACTIVE
HIV 1+2 AB+HIV1 P24 AG SERPL QL IA: NONREACTIVE
N GONORRHOEA DNA SPEC QL NAA+PROBE: NEGATIVE
T PALLIDUM AB SER QL: NONREACTIVE

## 2022-02-16 RX ORDER — HYDROMORPHONE HYDROCHLORIDE 1 MG/ML
SOLUTION ORAL
Qty: 60 ML | Refills: 0 | OUTPATIENT
Start: 2022-02-16

## 2022-02-16 NOTE — TELEPHONE ENCOUNTER
HYDROmorphone (DILAUDID) 2 MG tablet 5 tablet 0 2/15/2022 2/17/2022 No   Sig - Route: Take 1 tablet (2 mg) by mouth every 6 hours as needed for pain (if headache is intractable) - Oral   Sent to pharmacy as: HYDROmorphone HCl 2 MG Oral Tablet (DILAUDID)   Class: E-Prescribe   Earliest Fill Date: 2/15/2022   Order: [id number]   E-Prescribing Status: Receipt confirmed by pharmacy (2/15/2022  5:01 PM CST)       Outpatient Morphine Milligram Equivalents Per Day      2/15/22 - 2/17/22  32 MME/Day  Order Name Dose Route Frequency Maximum MME/Day    HYDROmorphone (DILAUDID) 2 MG tablet 2 mg Oral EVERY 6 HOURS PRN 32 MME/Day   Total Potential Morphine Milligram Equivalents Per Day 32 MME/Day     Calculation Information                2/18/22 and after  None        Printout Tracking    External Result Report     Pharmacy    [facility] - [place], [place] - [address] [place] [address]

## 2022-02-17 ENCOUNTER — MYC MEDICAL ADVICE (OUTPATIENT)
Dept: FAMILY MEDICINE | Facility: CLINIC | Age: 22
End: 2022-02-17
Payer: COMMERCIAL

## 2022-02-17 DIAGNOSIS — E55.9 VITAMIN D DEFICIENCY: Primary | ICD-10-CM

## 2022-02-18 RX ORDER — ACETAMINOPHEN 160 MG
1 TABLET,DISINTEGRATING ORAL DAILY
Qty: 90 CAPSULE | Refills: 3 | Status: SHIPPED | OUTPATIENT
Start: 2022-02-18 | End: 2023-02-09

## 2022-02-25 DIAGNOSIS — L85.8 KERATOSIS PILARIS: ICD-10-CM

## 2022-02-25 NOTE — TELEPHONE ENCOUNTER
Routing refill request to provider for review/approval because:  PCP to determine first refill     Leno Valencia RN

## 2022-02-25 NOTE — TELEPHONE ENCOUNTER
This was filled last week for 30grams with 3 refills.  She should not need refills.    Lupis Padron DO

## 2022-02-28 RX ORDER — TRIAMCINOLONE ACETONIDE 1 MG/G
CREAM TOPICAL 2 TIMES DAILY
Qty: 30 G | Refills: 3 | OUTPATIENT
Start: 2022-02-28

## 2022-03-16 ENCOUNTER — MYC MEDICAL ADVICE (OUTPATIENT)
Dept: FAMILY MEDICINE | Facility: CLINIC | Age: 22
End: 2022-03-16
Payer: COMMERCIAL

## 2022-03-16 DIAGNOSIS — G43.009 MIGRAINE WITHOUT AURA AND WITHOUT STATUS MIGRAINOSUS, NOT INTRACTABLE: Primary | ICD-10-CM

## 2022-03-16 NOTE — PROGRESS NOTES
Chief Complaint   Patient presents with     Ent Problem     positive strep x3 since 9/2021 and before that once a year- history of enlarged tonsils     History of Present Illness  Mary Bueno is a 21 year old female who presents today for evaluation.  I am seeing this patient in consultation for tonsil hypertrophy at the request of the provider Dr. Silver. The patient reports a history of chronic tonsillitis and recurrent acute tonsillitis. The patient describes bouts of significant sore throat with swelling of the tonsils. This happens 2-3 times per year, and has been going on for several years years. The patient has had at least 3 positive strep tests in the past 6 months.  There is no obvious snoring or possible apneic events.  She has problems with migraine control and her doctor thinks that this could be related to poor oxygenation during sleep.  She has not had a sleep study.  She wakes up well rested.  No daytime somnolence. No personal or family history of bleeding disorders or problems with anesthesia.    Past Medical History  Patient Active Problem List   Diagnosis     Recurrent UTI     Weight disorder     Health Care Home     Adjustment disorder with mixed anxiety and depressed mood     Obesity     Depression with anxiety     Current mild episode of major depressive disorder without prior episode (H)     Gastroesophageal reflux disease, esophagitis presence not specified     Increased insulin level     Nexplanon insertion     Migraine without aura and without status migrainosus, not intractable     Breakthrough bleeding on Nexplanon     Endometrial thickening on ultrasound     Current Medications     Current Outpatient Medications:      Cholecalciferol (VITAMIN D3) 50 MCG (2000 UT) CAPS, Take 1 capsule by mouth daily, Disp: 90 capsule, Rfl: 3     clindamycin-benzoyl peroxide (BENZACLIN) 1-5 % external gel, Apply topically 2 times daily, Disp: 35 g, Rfl: 3     clotrimazole (LOTRIMIN) 1 % external  cream, Apply topically 2 times daily To perineum as needed, Disp: 15 g, Rfl: 1     etonogestrel (IMPLANON/NEXPLANON) 68 MG IMPL, 1 each (68 mg) by Subdermal route continuous, Disp: , Rfl: 0     fluconazole (DIFLUCAN) 150 MG tablet, Take 1 tablet (150 mg) by mouth every 72 hours, Disp: 3 tablet, Rfl: 3     galcanezumab-gnlm (EMGALITY) 120 MG/ML injection, Inject 1 mL (120 mg) Subcutaneous every 28 days MAINTENANCE DOSE, Disp: 1 mL, Rfl: 11     ondansetron (ZOFRAN) 8 MG tablet, Take 1 tablet (8 mg) by mouth every 8 hours as needed for nausea, Disp: 90 tablet, Rfl: 1     pantoprazole (PROTONIX) 40 MG EC tablet, Take 1 tablet (40 mg) by mouth daily, Disp: 90 tablet, Rfl: 3     sertraline (ZOLOFT) 100 MG tablet, Take 1 tablet (100 mg) by mouth daily, Disp: 90 tablet, Rfl: 3     triamcinolone (KENALOG) 0.1 % external cream, Apply topically 2 times daily To thighs and flank s, Disp: 30 g, Rfl: 3     valACYclovir (VALTREX) 1000 mg tablet, Take 1 tablet (1,000 mg) by mouth 2 times daily for 10 days, Disp: 20 tablet, Rfl: 0    Allergies  No Known Allergies    Social History   Social History     Socioeconomic History     Marital status: Single     Spouse name: Not on file     Number of children: 0     Years of education: 6     Highest education level: Not on file   Occupational History     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   Tobacco Use     Smoking status: Never Smoker     Smokeless tobacco: Never Used   Vaping Use     Vaping Use: Some days     Substances: Nicotine   Substance and Sexual Activity     Alcohol use: No     Alcohol/week: 0.0 standard drinks     Drug use: No     Sexual activity: Yes     Partners: Male     Birth control/protection: Implant   Other Topics Concern     Not on file   Social History Narrative     Not on file     Social Determinants of Health     Financial Resource Strain: Not on file   Food Insecurity: Not on file   Transportation Needs: Not on file   Physical Activity: Not on file   Stress: Not on file   Social  Connections: Not on file   Intimate Partner Violence: Not on file   Housing Stability: Not on file       Family History  Family History   Problem Relation Age of Onset     Migraines Mother      Other - See Comments Father 31        MVA     Migraines Maternal Grandmother      Migraines Maternal Aunt      Coronary Artery Disease No family hx of      Other Cancer No family hx of        Review of Systems  As per HPI and PMHx, otherwise 10+ comprehensive system review is negative.    Physical Exam  /77 (BP Location: Right arm, Patient Position: Chair, Cuff Size: Adult Regular)   Pulse 85   Temp 98.8  F (37.1  C) (Tympanic)   Wt 113.4 kg (250 lb)   BMI 41.60 kg/m    GENERAL: Patient is a pleasant, cooperative 21 year old female in no acute distress.  HEAD: Normocephalic, atraumatic.  Hair and scalp are normal.  EYES: Pupils are equal, round, reactive to light and accommodation.  Extraocular movements are intact.  The sclera nonicteric without injection.  The extraocular structures are normal.  EARS: Normal shape and symmetry.  No tenderness when palpating the mastoid or tragal areas bilaterally.  Otoscopic exam reveals a minimal amount of cerumen bilaterally.  The bilateral tympanic membranes are round, intact without evidence of effusion, good landmarks.  No retraction, granulation, or drainage.  NOSE: Nares are patent.  Nasal mucosa is pink and moist.  Negative anterior rhinoscopy.  ORAL CAVITY: Dentition is in good repair.  Mucous membranes are moist.  Tongue is mobile, protrudes to the midline.  Palate elevates symmetrically.  Tonsils are 3.5+, symmetric.  No erythema or exudate.  No oral cavity or oropharyngeal masses, lesions, ulcerations, leukoplakia.  NECK: Supple, trachea is midline.  There no palpable cervical lymphadenopathy or masses bilaterally.  Palpation of the bilateral parotid and submandibular areas reveal no masses.  No thyromegaly.    NEUROLOGIC: Cranial nerves II through XII are grossly  intact.  Voice is strong.  Patient is House-Brackmann I/VI bilaterally.  CARDIOVASCULAR: Extremities are warm and well-perfused.  No significant peripheral edema.  RESPIRATORY: Patient has nonlabored breathing without cough, wheeze, stridor.  PSYCHIATRIC: Patient is alert and oriented.  Mood and affect appear normal.  SKIN: Warm and dry.  No scalp, face, or neck lesions noted.    Assessment and Plan     ICD-10-CM    1. Tonsillar hypertrophy  J35.1 Otolaryngology Referral     Case Request: TONSILLECTOMY WITH ADENOID INPSECTION, POSSIBLE ADENOIDECTOMY   2. Sleep disorder breathing  G47.30 Case Request: TONSILLECTOMY WITH ADENOID INPSECTION, POSSIBLE ADENOIDECTOMY   3. History of strep pharyngitis  Z87.09 Case Request: TONSILLECTOMY WITH ADENOID INPSECTION, POSSIBLE ADENOIDECTOMY   4. History of migraine headaches  Z86.69 Case Request: TONSILLECTOMY WITH ADENOID INPSECTION, POSSIBLE ADENOIDECTOMY   5. BMI 40.0-44.9, adult (H)  Z68.41 Case Request: TONSILLECTOMY WITH ADENOID INPSECTION, POSSIBLE ADENOIDECTOMY     It was my pleasure seeing Mary Bueno today in clinic.  The patient presents with recurrent acute tonsillitis, significant tonsil hypertrophy, and symptoms that could be related to sleep-disordered breathing.  Based on her symptoms and physical exam, I would recommend tonsillectomy. The remainder of the visit was spent discussing the procedure.    We discussed the risks, benefits, alternatives, options of bilateral tonsillectomy with adenoid inspection, possible adenoidectomy including, but not, limited to: risk of bleeding in roughly 5% of patients (maybe slightly higher risk given elevated BMI), risk of infection, risk of significant post-operative pain and dehydration, risk of injury to the teeth, tongue, lips, gums, potential need to return to the OR for control bleeding, blood transfusion, risk of general anesthesia.  We discussed potential need for narcotic (opioid) pain medication and risk of  dependency.  We discussed the postsurgical convalescence including time off of work or school with both activity and diet restrictions.  The patient voiced understanding and are willing to proceed.     Briefly discussed a sleep study prior to surgery, however, given her significant tonsil hypertrophy and pharyngitis symptoms, I think that surgery first would be a better option.  If she still struggling with headaches or sleep-related breathing issues, we would consider a sleep study at that time.    Moises Mckeon MD  Department of Otolaryngology-Head and Neck Surgery  [facility]

## 2022-03-21 ENCOUNTER — OFFICE VISIT (OUTPATIENT)
Dept: OTOLARYNGOLOGY | Facility: CLINIC | Age: 22
End: 2022-03-21
Attending: FAMILY MEDICINE
Payer: COMMERCIAL

## 2022-03-21 VITALS
TEMPERATURE: 98.8 F | DIASTOLIC BLOOD PRESSURE: 77 MMHG | WEIGHT: 250 LBS | BODY MASS INDEX: 41.6 KG/M2 | HEART RATE: 85 BPM | SYSTOLIC BLOOD PRESSURE: 120 MMHG

## 2022-03-21 DIAGNOSIS — J35.1 TONSILLAR HYPERTROPHY: Primary | ICD-10-CM

## 2022-03-21 DIAGNOSIS — Z86.69 HISTORY OF MIGRAINE HEADACHES: ICD-10-CM

## 2022-03-21 DIAGNOSIS — G47.30 SLEEP DISORDER BREATHING: ICD-10-CM

## 2022-03-21 DIAGNOSIS — Z87.09 HISTORY OF STREP PHARYNGITIS: ICD-10-CM

## 2022-03-21 PROCEDURE — 99244 OFF/OP CNSLTJ NEW/EST MOD 40: CPT | Performed by: OTOLARYNGOLOGY

## 2022-03-21 ASSESSMENT — PAIN SCALES - GENERAL: PAINLEVEL: NO PAIN (0)

## 2022-03-21 NOTE — NURSING NOTE
"Initial /77 (BP Location: Right arm, Patient Position: Chair, Cuff Size: Adult Regular)   Pulse 85   Temp 98.8  F (37.1  C) (Tympanic)   Wt 113.4 kg (250 lb)   BMI 41.60 kg/m   Estimated body mass index is 41.6 kg/m  as calculated from the following:    Height as of 2/15/22: 1.651 m (5' 5\").    Weight as of this encounter: 113.4 kg (250 lb). .    Iliana Gómez LPN    "

## 2022-03-21 NOTE — LETTER
3/21/2022         RE: Mary Bueno  [address]  [facility] [address]        Dear Colleague,    Thank you for referring your patient, Mary Bueno, to the [facility]. Please see a copy of my visit note below.    Chief Complaint   Patient presents with     Ent Problem     positive strep x3 since 9/2021 and before that once a year- history of enlarged tonsils     History of Present Illness  Mary Bueno is a 21 year old female who presents today for evaluation.  I am seeing this patient in consultation for tonsil hypertrophy at the request of the provider Dr. Silver. The patient reports a history of chronic tonsillitis and recurrent acute tonsillitis. The patient describes bouts of significant sore throat with swelling of the tonsils. This happens 2-3 times per year, and has been going on for several years years. The patient has had at least 3 positive strep tests in the past 6 months.  There is no obvious snoring or possible apneic events.  She has problems with migraine control and her doctor thinks that this could be related to poor oxygenation during sleep.  She has not had a sleep study.  She wakes up well rested.  No daytime somnolence. No personal or family history of bleeding disorders or problems with anesthesia.    Past Medical History  Patient Active Problem List   Diagnosis     Recurrent UTI     Weight disorder     Health Care Home     Adjustment disorder with mixed anxiety and depressed mood     Obesity     Depression with anxiety     Current mild episode of major depressive disorder without prior episode (H)     Gastroesophageal reflux disease, esophagitis presence not specified     Increased insulin level     Nexplanon insertion     Migraine without aura and without status migrainosus, not intractable     Breakthrough bleeding on Nexplanon     Endometrial thickening on ultrasound     Current Medications     Current Outpatient Medications:       Cholecalciferol (VITAMIN D3) 50 MCG (2000 UT) CAPS, Take 1 capsule by mouth daily, Disp: 90 capsule, Rfl: 3     clindamycin-benzoyl peroxide (BENZACLIN) 1-5 % external gel, Apply topically 2 times daily, Disp: 35 g, Rfl: 3     clotrimazole (LOTRIMIN) 1 % external cream, Apply topically 2 times daily To perineum as needed, Disp: 15 g, Rfl: 1     etonogestrel (IMPLANON/NEXPLANON) 68 MG IMPL, 1 each (68 mg) by Subdermal route continuous, Disp: , Rfl: 0     fluconazole (DIFLUCAN) 150 MG tablet, Take 1 tablet (150 mg) by mouth every 72 hours, Disp: 3 tablet, Rfl: 3     galcanezumab-gnlm (EMGALITY) 120 MG/ML injection, Inject 1 mL (120 mg) Subcutaneous every 28 days MAINTENANCE DOSE, Disp: 1 mL, Rfl: 11     ondansetron (ZOFRAN) 8 MG tablet, Take 1 tablet (8 mg) by mouth every 8 hours as needed for nausea, Disp: 90 tablet, Rfl: 1     pantoprazole (PROTONIX) 40 MG EC tablet, Take 1 tablet (40 mg) by mouth daily, Disp: 90 tablet, Rfl: 3     sertraline (ZOLOFT) 100 MG tablet, Take 1 tablet (100 mg) by mouth daily, Disp: 90 tablet, Rfl: 3     triamcinolone (KENALOG) 0.1 % external cream, Apply topically 2 times daily To thighs and flank s, Disp: 30 g, Rfl: 3     valACYclovir (VALTREX) 1000 mg tablet, Take 1 tablet (1,000 mg) by mouth 2 times daily for 10 days, Disp: 20 tablet, Rfl: 0    Allergies  No Known Allergies    Social History   Social History     Socioeconomic History     Marital status: Single     Spouse name: Not on file     Number of children: 0     Years of education: 6     Highest education level: Not on file   Occupational History     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   Tobacco Use     Smoking status: Never Smoker     Smokeless tobacco: Never Used   Vaping Use     Vaping Use: Some days     Substances: Nicotine   Substance and Sexual Activity     Alcohol use: No     Alcohol/week: 0.0 standard drinks     Drug use: No     Sexual activity: Yes     Partners: Male     Birth control/protection: Implant   Other Topics Concern     Not on  file   Social History Narrative     Not on file     Social Determinants of Health     Financial Resource Strain: Not on file   Food Insecurity: Not on file   Transportation Needs: Not on file   Physical Activity: Not on file   Stress: Not on file   Social Connections: Not on file   Intimate Partner Violence: Not on file   Housing Stability: Not on file       Family History  Family History   Problem Relation Age of Onset     Migraines Mother      Other - See Comments Father 31        MVA     Migraines Maternal Grandmother      Migraines Maternal Aunt      Coronary Artery Disease No family hx of      Other Cancer No family hx of        Review of Systems  As per HPI and PMHx, otherwise 10+ comprehensive system review is negative.    Physical Exam  /77 (BP Location: Right arm, Patient Position: Chair, Cuff Size: Adult Regular)   Pulse 85   Temp 98.8  F (37.1  C) (Tympanic)   Wt 113.4 kg (250 lb)   BMI 41.60 kg/m    GENERAL: Patient is a pleasant, cooperative 21 year old female in no acute distress.  HEAD: Normocephalic, atraumatic.  Hair and scalp are normal.  EYES: Pupils are equal, round, reactive to light and accommodation.  Extraocular movements are intact.  The sclera nonicteric without injection.  The extraocular structures are normal.  EARS: Normal shape and symmetry.  No tenderness when palpating the mastoid or tragal areas bilaterally.  Otoscopic exam reveals a minimal amount of cerumen bilaterally.  The bilateral tympanic membranes are round, intact without evidence of effusion, good landmarks.  No retraction, granulation, or drainage.  NOSE: Nares are patent.  Nasal mucosa is pink and moist.  Negative anterior rhinoscopy.  ORAL CAVITY: Dentition is in good repair.  Mucous membranes are moist.  Tongue is mobile, protrudes to the midline.  Palate elevates symmetrically.  Tonsils are 3.5+, symmetric.  No erythema or exudate.  No oral cavity or oropharyngeal masses, lesions, ulcerations,  leukoplakia.  NECK: Supple, trachea is midline.  There no palpable cervical lymphadenopathy or masses bilaterally.  Palpation of the bilateral parotid and submandibular areas reveal no masses.  No thyromegaly.    NEUROLOGIC: Cranial nerves II through XII are grossly intact.  Voice is strong.  Patient is House-Brackmann I/VI bilaterally.  CARDIOVASCULAR: Extremities are warm and well-perfused.  No significant peripheral edema.  RESPIRATORY: Patient has nonlabored breathing without cough, wheeze, stridor.  PSYCHIATRIC: Patient is alert and oriented.  Mood and affect appear normal.  SKIN: Warm and dry.  No scalp, face, or neck lesions noted.    Assessment and Plan     ICD-10-CM    1. Tonsillar hypertrophy  J35.1 Otolaryngology Referral     Case Request: TONSILLECTOMY WITH ADENOID INPSECTION, POSSIBLE ADENOIDECTOMY   2. Sleep disorder breathing  G47.30 Case Request: TONSILLECTOMY WITH ADENOID INPSECTION, POSSIBLE ADENOIDECTOMY   3. History of strep pharyngitis  Z87.09 Case Request: TONSILLECTOMY WITH ADENOID INPSECTION, POSSIBLE ADENOIDECTOMY   4. History of migraine headaches  Z86.69 Case Request: TONSILLECTOMY WITH ADENOID INPSECTION, POSSIBLE ADENOIDECTOMY   5. BMI 40.0-44.9, adult (H)  Z68.41 Case Request: TONSILLECTOMY WITH ADENOID INPSECTION, POSSIBLE ADENOIDECTOMY     It was my pleasure seeing Mary Bueno today in clinic.  The patient presents with recurrent acute tonsillitis, significant tonsil hypertrophy, and symptoms that could be related to sleep-disordered breathing.  Based on her symptoms and physical exam, I would recommend tonsillectomy. The remainder of the visit was spent discussing the procedure.    We discussed the risks, benefits, alternatives, options of bilateral tonsillectomy with adenoid inspection, possible adenoidectomy including, but not, limited to: risk of bleeding in roughly 5% of patients (maybe slightly higher risk given elevated BMI), risk of infection, risk of significant  post-operative pain and dehydration, risk of injury to the teeth, tongue, lips, gums, potential need to return to the OR for control bleeding, blood transfusion, risk of general anesthesia.  We discussed potential need for narcotic (opioid) pain medication and risk of dependency.  We discussed the postsurgical convalescence including time off of work or school with both activity and diet restrictions.  The patient voiced understanding and are willing to proceed.     Briefly discussed a sleep study prior to surgery, however, given her significant tonsil hypertrophy and pharyngitis symptoms, I think that surgery first would be a better option.  If she still struggling with headaches or sleep-related breathing issues, we would consider a sleep study at that time.    Moises Mckeon MD  Department of Otolaryngology-Head and Neck Surgery  [facility]        Again, thank you for allowing me to participate in the care of your patient.        Sincerely,        Moises Mckeon MD

## 2022-04-04 ENCOUNTER — TELEPHONE (OUTPATIENT)
Dept: FAMILY MEDICINE | Facility: CLINIC | Age: 22
End: 2022-04-04

## 2022-04-04 ENCOUNTER — OFFICE VISIT (OUTPATIENT)
Dept: URGENT CARE | Facility: URGENT CARE | Age: 22
End: 2022-04-04
Payer: COMMERCIAL

## 2022-04-04 ENCOUNTER — TELEPHONE (OUTPATIENT)
Dept: OTOLARYNGOLOGY | Facility: CLINIC | Age: 22
End: 2022-04-04

## 2022-04-04 VITALS
DIASTOLIC BLOOD PRESSURE: 68 MMHG | WEIGHT: 248 LBS | TEMPERATURE: 98.6 F | HEART RATE: 92 BPM | SYSTOLIC BLOOD PRESSURE: 122 MMHG | OXYGEN SATURATION: 99 % | BODY MASS INDEX: 41.27 KG/M2

## 2022-04-04 DIAGNOSIS — R07.0 THROAT PAIN: Primary | ICD-10-CM

## 2022-04-04 LAB
DEPRECATED S PYO AG THROAT QL EIA: NEGATIVE
GROUP A STREP BY PCR: NOT DETECTED

## 2022-04-04 PROCEDURE — U0005 INFEC AGEN DETEC AMPLI PROBE: HCPCS | Performed by: PHYSICIAN ASSISTANT

## 2022-04-04 PROCEDURE — 99213 OFFICE O/P EST LOW 20 MIN: CPT | Mod: CS | Performed by: PHYSICIAN ASSISTANT

## 2022-04-04 PROCEDURE — U0003 INFECTIOUS AGENT DETECTION BY NUCLEIC ACID (DNA OR RNA); SEVERE ACUTE RESPIRATORY SYNDROME CORONAVIRUS 2 (SARS-COV-2) (CORONAVIRUS DISEASE [COVID-19]), AMPLIFIED PROBE TECHNIQUE, MAKING USE OF HIGH THROUGHPUT TECHNOLOGIES AS DESCRIBED BY CMS-2020-01-R: HCPCS | Performed by: PHYSICIAN ASSISTANT

## 2022-04-04 PROCEDURE — 87651 STREP A DNA AMP PROBE: CPT | Performed by: PHYSICIAN ASSISTANT

## 2022-04-04 NOTE — PROGRESS NOTES
Assessment & Plan     Throat pain  Rapid strep negative.PCR pending. Continue with supportive care. Return to clinic if symptoms worsen or do not improve; otherwise follow up as needed        - Streptococcus A Rapid Screen w/Reflex to PCR - Clinic Collect  - Group A Streptococcus PCR Throat Swab  - Symptomatic; Yes; 4/3/2022 COVID-19 Virus (Coronavirus) by PCR Nose                 Return in about 1 week (around 4/11/2022), or if symptoms worsen or fail to improve.    LUIGI Mills [facility] [place]            Subjective   Chief Complaint   Patient presents with     Throat Pain     believes she has strep. Tonsilectomy scheduled for may.         HPI     URI Adult    Onset of symptoms was 1 day(s) ago.  Course of illness is same.    Severity moderate  Current and Associated symptoms: sore throat   Treatment measures tried include Tylenol/Ibuprofen.  Predisposing factors include history of strep throat .              Review of Systems   Constitutional, HEENT, cardiovascular, pulmonary, gi and gu systems are negative, except as otherwise noted.      Objective    /68 (BP Location: Right arm, Patient Position: Sitting, Cuff Size: Adult Regular)   Pulse 92   Temp 98.6  F (37  C) (Tympanic)   Wt 112.5 kg (248 lb)   SpO2 99%   BMI 41.27 kg/m    Body mass index is 41.27 kg/m .  Physical Exam  Constitutional:       Appearance: She is well-developed.   HENT:      Head: Normocephalic.      Right Ear: Tympanic membrane and ear canal normal.      Left Ear: Tympanic membrane and ear canal normal.      Mouth/Throat:      Comments: Throat has mild erythema, no lesions or exudates. Tonsils 3+ symmetric which appear to be at baseline based on last ENT note.   Eyes:      Conjunctiva/sclera: Conjunctivae normal.   Cardiovascular:      Rate and Rhythm: Normal rate.      Heart sounds: Normal heart sounds.   Pulmonary:      Effort: Pulmonary effort is normal.      Breath sounds: Normal breath  sounds.   Skin:     General: Skin is warm and dry.      Findings: No rash.   Psychiatric:         Behavior: Behavior normal.            Results for orders placed or performed in visit on 04/04/22 (from the past 24 hour(s))   Streptococcus A Rapid Screen w/Reflex to PCR - Clinic Collect    Specimen: Throat; Swab   Result Value Ref Range    Group A Strep antigen Negative Negative   Group A Streptococcus PCR Throat Swab    Specimen: Throat; Swab   Result Value Ref Range    Group A strep by PCR Not Detected Not Detected    Narrative    The Xpert Xpress Strep A test, performed on the [employer], is a rapid, qualitative in vitro diagnostic test for the detection of Streptococcus pyogenes (Group A ß-hemolytic Streptococcus, Strep A) in throat swab specimens from patients with signs and symptoms of pharyngitis. The Xpert Xpress Strep A test can be used as an aid in the diagnosis of Group A Streptococcal pharyngitis. The assay is not intended to monitor treatment for Group A Streptococcus infections. The Xpert Xpress Strep A test utilizes an automated real-time polymerase chain reaction (PCR) to detect Streptococcus pyogenes DNA.

## 2022-04-04 NOTE — TELEPHONE ENCOUNTER
Called patient and let her know that Dr. Mckeon is out of clinic for one week. Pt stated that she is very prone to getting strep throat and she feels as though she is getting another bout of it. She is concerned that she may have gone into urgent care too soon for the the rapid test to be positive and is worried about her tonsils swelling and missing work. Pt is requesting PCP now review and treat in place of ENT. Pt scheduled to have tonsillectomy in May.   Lydia ALLEN RN BSN PHN  Specialty Clinics

## 2022-04-04 NOTE — LETTER
[facility] [place]  [address] [address]  Phone: [phone]  Fax: [phone]    April 4, 2022        Mary Bueno  [address]  [facility] [address]          To whom it may concern:    RE: Mary Bueno    Patient was seen and treated today at our clinic and missed work 4/4/22 and 4/5/22.    Please contact me for questions or concerns.      Sincerely,        Carol Dyer PA-C

## 2022-04-04 NOTE — TELEPHONE ENCOUNTER
Reason for Call:  Other call back    Detailed comments: Pt states she is prone to strep throat.  Pt thinks she has strep-went to doctor and test came out negative. Pt thinks she will show up positive after culture and is requesting oral antibiotic or antibiotic shot. Pt wants to miss the least amout of work as possible. [facility].  Please advise.    Phone Number Patient can be reached at: Cell number on file:    Telephone Information:   Mobile [phone]       Best Time: any    Can we leave a detailed message on this number? YES    Call taken on 4/4/2022 at 11:03 AM by Woodrow Garcia

## 2022-04-04 NOTE — TELEPHONE ENCOUNTER
Pt called again, routing to [place] per request.  If oral antibiotics are ordered, patient also requesting diflucan to [facility].    Thank you,    Chary Veliz, RICKI Walker

## 2022-04-05 ENCOUNTER — OFFICE VISIT (OUTPATIENT)
Dept: FAMILY MEDICINE | Facility: CLINIC | Age: 22
End: 2022-04-05
Payer: COMMERCIAL

## 2022-04-05 VITALS
HEIGHT: 65 IN | WEIGHT: 247 LBS | HEART RATE: 116 BPM | OXYGEN SATURATION: 99 % | TEMPERATURE: 103.2 F | DIASTOLIC BLOOD PRESSURE: 74 MMHG | BODY MASS INDEX: 41.15 KG/M2 | SYSTOLIC BLOOD PRESSURE: 126 MMHG

## 2022-04-05 DIAGNOSIS — J02.0 ACUTE STREPTOCOCCAL PHARYNGITIS: Primary | ICD-10-CM

## 2022-04-05 DIAGNOSIS — R07.0 THROAT PAIN: ICD-10-CM

## 2022-04-05 DIAGNOSIS — F32.0 CURRENT MILD EPISODE OF MAJOR DEPRESSIVE DISORDER WITHOUT PRIOR EPISODE (H): ICD-10-CM

## 2022-04-05 LAB
DEPRECATED S PYO AG THROAT QL EIA: POSITIVE
SARS-COV-2 RNA RESP QL NAA+PROBE: NEGATIVE

## 2022-04-05 PROCEDURE — 87880 STREP A ASSAY W/OPTIC: CPT | Performed by: PHYSICIAN ASSISTANT

## 2022-04-05 PROCEDURE — 96372 THER/PROPH/DIAG INJ SC/IM: CPT | Performed by: PHYSICIAN ASSISTANT

## 2022-04-05 PROCEDURE — 99213 OFFICE O/P EST LOW 20 MIN: CPT | Mod: 25 | Performed by: PHYSICIAN ASSISTANT

## 2022-04-05 RX ORDER — HYDROMORPHONE HYDROCHLORIDE 1 MG/ML
2 SOLUTION ORAL EVERY 6 HOURS PRN
Qty: 10 ML | Refills: 0 | Status: SHIPPED | OUTPATIENT
Start: 2022-04-05 | End: 2022-04-07

## 2022-04-05 NOTE — PROGRESS NOTES
"  Assessment & Plan     (J02.0) Acute streptococcal pharyngitis  (primary encounter diagnosis)  Comment: + strep, IM injection in clinic today. Decadron for inflammation. Gets dilaudid for headaches - changed to liquid given inability to swallow  Plan: dexamethasone (DECADRON) 1 MG/ML (HIGH CONC)         solution, penicillin G benzathine (BICILLIN         L-A) injection 2.4 Million Units,         HYDROmorphone, STANDARD CONC, (DILAUDID) 1         MG/ML oral solution      (R07.0) Throat pain  Comment:   Plan: Streptococcus A Rapid Screen w/Reflex to PCR -         Clinic Collect, HYDROmorphone, STANDARD CONC,         (DILAUDID) 1 MG/ML oral solution            (F32.0) Current mild episode of major depressive disorder without prior episode (H)  Comment: tearful and upset today given how she has been feeling  Plan: stable (when not iill)      Return in about 1 month (around 5/5/2022) for tonsillectomy.    LUIGI Cardenas [facility] RUPINDER Feliciano is a 21 year old who presents for the following health issues     HPI     Acute Illness  Acute illness concerns: Strep  Onset/Duration: Sunday night 4/3  Symptoms:  Fever: YES  Chills/Sweats: YES  Headache (location?): YES  Sinus Pressure: YES  Conjunctivitis:  no  Ear Pain: YES: bilateral  Rhinorrhea: YES  Congestion: YES  Sore Throat: YES  Cough: no  Wheeze: no  Decreased Appetite: YES  Nausea: YES  Vomiting: no  Diarrhea: no  Dysuria/Freq.: no  Dysuria or Hematuria: no  Fatigue/Achiness: YES- both   Sick/Strep Exposure: no  Therapies tried and outcome: Tylenol, Aleve     Recurrent strep - pattern recently has been monthly  Scheduled for tonsillectomy next month  Unable to swallow - severe pain but can't take anything      Review of Systems   Remainder of ROS obtained and found to be negative other than that which was documented above        Objective    /74   Pulse 116   Temp (!) 103.2  F (39.6  C) (Tympanic)   Ht 1.651 m (5' 5\")  "  Wt 112 kg (247 lb)   SpO2 99%   BMI 41.10 kg/m    Body mass index is 41.1 kg/m .  Physical Exam   GENERAL: healthy, alert and no distress  THROAT: Tonsils 2+ bilaterally - nearly kissing but airway open  NECK: b/l cervical adenopathy  CV: regular rate and rhythm, normal S1 S2, no S3 or S4, no murmur, click or rub, no peripheral edema and peripheral pulses strong  LUNGS: CTA    Results for orders placed or performed in visit on 04/05/22 (from the past 24 hour(s))   Streptococcus A Rapid Screen w/Reflex to PCR - Clinic Collect    Specimen: Throat; Swab   Result Value Ref Range    Group A Strep antigen Positive (A) Negative

## 2022-04-05 NOTE — PROGRESS NOTES
Clinic Administered Medication Documentation    Administrations This Visit     penicillin G benzathine (BICILLIN L-A) injection 2.4 Million Units     Admin Date  04/05/2022 Action  Given Dose  1.2 Million Units Route  Intramuscular Site  Right Gluteus Ryan Administered By  Flavio Witt RN    Ordering Provider: Miriam Phillips PA-C    NDC: 58830-115-36    Lot#:     : [id number] [id number]    Patient Supplied?: No    Comments: Gave 2.4 million units; 1.2 in right and in left gluteal              PCN G benzathine (Bicillin left-A) total of 2.4 milllion units given. 1.2 million in left gluteus ryan and 1.2 million in right gluteus ryan    Patient remained in clinic for 15 minutes afterwards without any reaction. Her name and date of birth were verified before administering injections.     Flavio Witt RN

## 2022-04-06 ENCOUNTER — MYC MEDICAL ADVICE (OUTPATIENT)
Dept: FAMILY MEDICINE | Facility: CLINIC | Age: 22
End: 2022-04-06
Payer: COMMERCIAL

## 2022-04-06 DIAGNOSIS — J02.0 ACUTE STREPTOCOCCAL PHARYNGITIS: ICD-10-CM

## 2022-04-06 DIAGNOSIS — R07.0 THROAT PAIN: ICD-10-CM

## 2022-04-07 RX ORDER — HYDROMORPHONE HYDROCHLORIDE 1 MG/ML
2 SOLUTION ORAL EVERY 6 HOURS PRN
Qty: 8 ML | Refills: 0 | Status: SHIPPED | OUTPATIENT
Start: 2022-04-07 | End: 2022-05-03

## 2022-04-11 ENCOUNTER — TELEPHONE (OUTPATIENT)
Dept: FAMILY MEDICINE | Facility: CLINIC | Age: 22
End: 2022-04-11

## 2022-04-11 ENCOUNTER — OFFICE VISIT (OUTPATIENT)
Dept: FAMILY MEDICINE | Facility: CLINIC | Age: 22
End: 2022-04-11
Payer: COMMERCIAL

## 2022-04-11 VITALS
BODY MASS INDEX: 41.32 KG/M2 | SYSTOLIC BLOOD PRESSURE: 120 MMHG | DIASTOLIC BLOOD PRESSURE: 66 MMHG | OXYGEN SATURATION: 98 % | HEIGHT: 65 IN | WEIGHT: 248 LBS | TEMPERATURE: 98.6 F | HEART RATE: 99 BPM

## 2022-04-11 DIAGNOSIS — R30.0 DYSURIA: ICD-10-CM

## 2022-04-11 DIAGNOSIS — R50.9 FEVER, UNSPECIFIED FEVER CAUSE: ICD-10-CM

## 2022-04-11 DIAGNOSIS — J02.0 STREPTOCOCCAL PHARYNGITIS: Primary | ICD-10-CM

## 2022-04-11 DIAGNOSIS — J02.9 SORE THROAT: ICD-10-CM

## 2022-04-11 DIAGNOSIS — E55.9 VITAMIN D DEFICIENCY: ICD-10-CM

## 2022-04-11 DIAGNOSIS — Z11.3 SCREEN FOR STD (SEXUALLY TRANSMITTED DISEASE): ICD-10-CM

## 2022-04-11 LAB
ALBUMIN SERPL-MCNC: 3.5 G/DL (ref 3.4–5)
ALBUMIN UR-MCNC: NEGATIVE MG/DL
ALP SERPL-CCNC: 100 U/L (ref 40–150)
ALT SERPL W P-5'-P-CCNC: 21 U/L (ref 0–50)
ANION GAP SERPL CALCULATED.3IONS-SCNC: 7 MMOL/L (ref 3–14)
APPEARANCE UR: CLEAR
AST SERPL W P-5'-P-CCNC: 12 U/L (ref 0–35)
BACTERIA #/AREA URNS HPF: ABNORMAL /HPF
BILIRUB SERPL-MCNC: 0.2 MG/DL (ref 0.2–1.3)
BILIRUB UR QL STRIP: NEGATIVE
BUN SERPL-MCNC: 12 MG/DL (ref 7–30)
CALCIUM SERPL-MCNC: 9 MG/DL (ref 8.5–10.1)
CHLORIDE BLD-SCNC: 104 MMOL/L (ref 96–110)
CO2 SERPL-SCNC: 25 MMOL/L (ref 20–32)
COLOR UR AUTO: YELLOW
CREAT SERPL-MCNC: 0.55 MG/DL (ref 0.5–1)
DEPRECATED S PYO AG THROAT QL EIA: POSITIVE
GFR SERPL CREATININE-BSD FRML MDRD: >90 ML/MIN/1.73M2
GLUCOSE BLD-MCNC: 80 MG/DL (ref 70–99)
GLUCOSE UR STRIP-MCNC: NEGATIVE MG/DL
HGB UR QL STRIP: ABNORMAL
KETONES UR STRIP-MCNC: NEGATIVE MG/DL
LEUKOCYTE ESTERASE UR QL STRIP: NEGATIVE
NITRATE UR QL: NEGATIVE
PH UR STRIP: 7.5 [PH] (ref 5–7)
POTASSIUM BLD-SCNC: 4 MMOL/L (ref 3.4–5.3)
PROT SERPL-MCNC: 8.5 G/DL (ref 6.8–8.8)
RBC #/AREA URNS AUTO: ABNORMAL /HPF
SODIUM SERPL-SCNC: 136 MMOL/L (ref 133–144)
SP GR UR STRIP: 1.02 (ref 1–1.03)
SQUAMOUS #/AREA URNS AUTO: ABNORMAL /LPF
UROBILINOGEN UR STRIP-ACNC: 0.2 E.U./DL
WBC #/AREA URNS AUTO: ABNORMAL /HPF

## 2022-04-11 PROCEDURE — 86665 EPSTEIN-BARR CAPSID VCA: CPT | Performed by: PHYSICIAN ASSISTANT

## 2022-04-11 PROCEDURE — 87591 N.GONORRHOEAE DNA AMP PROB: CPT | Performed by: PHYSICIAN ASSISTANT

## 2022-04-11 PROCEDURE — 86665 EPSTEIN-BARR CAPSID VCA: CPT | Mod: 59 | Performed by: PHYSICIAN ASSISTANT

## 2022-04-11 PROCEDURE — 81001 URINALYSIS AUTO W/SCOPE: CPT | Performed by: PHYSICIAN ASSISTANT

## 2022-04-11 PROCEDURE — 87389 HIV-1 AG W/HIV-1&-2 AB AG IA: CPT | Performed by: PHYSICIAN ASSISTANT

## 2022-04-11 PROCEDURE — 86780 TREPONEMA PALLIDUM: CPT | Performed by: PHYSICIAN ASSISTANT

## 2022-04-11 PROCEDURE — 99214 OFFICE O/P EST MOD 30 MIN: CPT | Performed by: PHYSICIAN ASSISTANT

## 2022-04-11 PROCEDURE — 86803 HEPATITIS C AB TEST: CPT | Performed by: PHYSICIAN ASSISTANT

## 2022-04-11 PROCEDURE — 80053 COMPREHEN METABOLIC PANEL: CPT | Performed by: PHYSICIAN ASSISTANT

## 2022-04-11 PROCEDURE — 87880 STREP A ASSAY W/OPTIC: CPT | Performed by: PHYSICIAN ASSISTANT

## 2022-04-11 PROCEDURE — 82306 VITAMIN D 25 HYDROXY: CPT | Performed by: PHYSICIAN ASSISTANT

## 2022-04-11 PROCEDURE — 36415 COLL VENOUS BLD VENIPUNCTURE: CPT | Performed by: PHYSICIAN ASSISTANT

## 2022-04-11 PROCEDURE — 87086 URINE CULTURE/COLONY COUNT: CPT | Performed by: PHYSICIAN ASSISTANT

## 2022-04-11 PROCEDURE — 87491 CHLMYD TRACH DNA AMP PROBE: CPT | Performed by: PHYSICIAN ASSISTANT

## 2022-04-11 RX ORDER — CEPHALEXIN 500 MG/1
500 CAPSULE ORAL 2 TIMES DAILY
Qty: 20 CAPSULE | Refills: 0 | Status: SHIPPED | OUTPATIENT
Start: 2022-04-11 | End: 2022-04-21

## 2022-04-11 ASSESSMENT — PAIN SCALES - GENERAL: PAINLEVEL: MODERATE PAIN (5)

## 2022-04-11 NOTE — PROGRESS NOTES
Assessment & Plan     (J02.0) Streptococcal pharyngitis  (primary encounter diagnosis)  Comment: strep test positive - likely colonized given that she has been positive for the last several times we have checked so suspect she never fully clears it. Is scheduled for a tonsillectomy on May 13th  Plan: cephALEXin (KEFLEX) 500 MG capsule            (R30.0) Dysuria  Comment: UA not suggestive of infection  Plan: UA macro with reflex to Microscopic and Culture        - Clinc Collect, Chlamydia trachomatis PCR -         Clinic Collect, Neisseria gonorrhoeae PCR -         Clinic Collect, Urine Microscopic, Urine         Culture Aerobic Bacterial - lab collect            (E55.9) Vitamin D deficiency  Comment:   Plan: Vitamin D Deficiency            (R50.9) Fever, unspecified fever cause  Comment: low grade, overall improved from last visit but still not feeling well  Plan: CBC with platelets and differential,         Comprehensive metabolic panel (BMP + Alb, Alk         Phos, ALT, AST, Total. Bili, TP), EBV Capsid         Antibody IgG, EBV Capsid Antibody IgM            (Z11.3) Screen for STD (sexually transmitted disease)  Comment:   Plan: HIV Antigen Antibody Combo, Treponema Abs w         Reflex to RPR and Titer, Hepatitis C Screen         Reflex to HCV RNA Quant and Genotype            (J02.9) Sore throat  Comment:   Plan: Streptococcus A Rapid Scr w Reflx to PCR - Lab         Collect, CANCELED: Respiratory Aerobic         Bacterial Culture              No follow-ups on file.    LUIGI Cardenas [facility] RUPINDER Feliciano is a 21 year old who presents for the following health issues     HPI     Genitourinary - Female  Onset/Duration: Saturday 4/9  Description:   Painful urination (Dysuria): YES           Frequency: YES  Blood in urine (Hematuria): no  Delay in urine (Hesitency): no  Intensity: mild  Progression of Symptoms:  same  Accompanying Signs & Symptoms:  Fever/chills:  "no  Flank pain: no  Nausea and vomiting: no  Vaginal symptoms: none  Abdominal/Pelvic Pain: no  History:   History of frequent UTI s: YES, she gets them sometimes after taking antibiotics   History of kidney stones: no  Sexually Active: YES  Possibility of pregnancy: Don't Know  Precipitating or alleviating factors: None  Therapies tried and outcome: Increase fluid intake and cranberry pills     -Mom would like her to have her vitamin d levels checked today.     -Her throat is still sore and she is having fevers off and on.     Still with a sore throat although not nearly as bad as it was when she was seen on the 5th  States her throat has never been that bad before    Still having low grade temps - up to 100 this morning but notes she is still waking up with sweats and chills overnight so wondering if she is having fevers at night    Worried she could have UTI as she states she gets either UTI's or yeast infections when she is on antibiotics    She would also like STD's checked. She is currently sexually active with the same partner(s) but does not use protection  Cannot due surgery before May 11th due to work      Review of Systems   Remainder of ROS obtained and found to be negative other than that which was documented above        Objective    /66   Pulse 99   Temp 98.6  F (37  C) (Tympanic)   Ht 1.651 m (5' 5\")   Wt 112.5 kg (248 lb)   SpO2 98%   BMI 41.27 kg/m    Body mass index is 41.27 kg/m .  Physical Exam   GENERAL: healthy, alert and no distress  EYES: Eyes grossly normal to inspection  HENT: normal cephalic/atraumatic, ear canals and TM's normal, nose and mouth without ulcers or lesions   Tonsils 2+ bilaterally, uvula midline. No erythema or white patches noted today.  NECK: bilateral lymphadenopathy and swollen glands  RESP: lungs clear to auscultation - no rales, rhonchi or wheezes  CV: regular rates and rhythm  SKIN: no suspicious lesions or rashes  PSYCH: mentation appears normal, affect " normal/bright    Results for orders placed or performed in visit on 04/11/22   UA macro with reflex to Microscopic and Culture - Clinc Collect     Status: Abnormal    Specimen: Urine, Clean Catch   Result Value Ref Range    Color Urine Yellow Colorless, Straw, Light Yellow, Yellow    Appearance Urine Clear Clear    Glucose Urine Negative Negative mg/dL    Bilirubin Urine Negative Negative    Ketones Urine Negative Negative mg/dL    Specific Gravity Urine 1.025 1.003 - 1.035    Blood Urine Trace (A) Negative    pH Urine 7.5 (H) 5.0 - 7.0    Protein Albumin Urine Negative Negative mg/dL    Urobilinogen Urine 0.2 0.2, 1.0 E.U./dL    Nitrite Urine Negative Negative    Leukocyte Esterase Urine Negative Negative   Urine Microscopic     Status: Abnormal   Result Value Ref Range    Bacteria Urine Few (A) None Seen /HPF    RBC Urine 0-2 0-2 /HPF /HPF    WBC Urine 0-5 0-5 /HPF /HPF    Squamous Epithelials Urine Few (A) None Seen /LPF    Narrative    Urine Culture not indicated   Comprehensive metabolic panel (BMP + Alb, Alk Phos, ALT, AST, Total. Bili, TP)     Status: Normal   Result Value Ref Range    Sodium 136 133 - 144 mmol/L    Potassium 4.0 3.4 - 5.3 mmol/L    Chloride 104 96 - 110 mmol/L    Carbon Dioxide (CO2) 25 20 - 32 mmol/L    Anion Gap 7 3 - 14 mmol/L    Urea Nitrogen 12 7 - 30 mg/dL    Creatinine 0.55 0.50 - 1.00 mg/dL    Calcium 9.0 8.5 - 10.1 mg/dL    Glucose 80 70 - 99 mg/dL    Alkaline Phosphatase 100 40 - 150 U/L    AST 12 0 - 35 U/L    ALT 21 0 - 50 U/L    Protein Total 8.5 6.8 - 8.8 g/dL    Albumin 3.5 3.4 - 5.0 g/dL    Bilirubin Total 0.2 0.2 - 1.3 mg/dL    GFR Estimate >90 >60 mL/min/1.73m2   Streptococcus A Rapid Scr w Reflx to PCR - Lab Collect     Status: Abnormal    Specimen: Throat; Swab   Result Value Ref Range    Group A Strep antigen Positive (A) Negative

## 2022-04-11 NOTE — TELEPHONE ENCOUNTER
Patient's call transferred to author    Patient requesting if antibiotics are needed for strep result from today     Preferred pharmacy: [facility] in [place]     Leno Valencia RN

## 2022-04-11 NOTE — TELEPHONE ENCOUNTER
I called and spoke with patients regarding her results and confirming plan  Miriam Phillips PA-C

## 2022-04-12 LAB
C TRACH DNA SPEC QL NAA+PROBE: NEGATIVE
DEPRECATED CALCIDIOL+CALCIFEROL SERPL-MC: 32 UG/L (ref 20–75)
EBV VCA IGG SER IA-ACNC: 562 U/ML
EBV VCA IGG SER IA-ACNC: POSITIVE
EBV VCA IGM SER IA-ACNC: <10 U/ML
EBV VCA IGM SER IA-ACNC: NORMAL
HCV AB SERPL QL IA: NONREACTIVE
HIV 1+2 AB+HIV1 P24 AG SERPL QL IA: NONREACTIVE
N GONORRHOEA DNA SPEC QL NAA+PROBE: NEGATIVE
T PALLIDUM AB SER QL: NONREACTIVE

## 2022-04-13 LAB — BACTERIA UR CULT: NORMAL

## 2022-04-21 DIAGNOSIS — J35.1 TONSILLAR HYPERTROPHY: Primary | ICD-10-CM

## 2022-04-21 DIAGNOSIS — J35.01 CHRONIC TONSILLITIS: ICD-10-CM

## 2022-04-21 DIAGNOSIS — Z87.09 HISTORY OF STREP PHARYNGITIS: ICD-10-CM

## 2022-04-21 RX ORDER — PREDNISONE 10 MG/1
30 TABLET ORAL DAILY
Qty: 15 TABLET | Refills: 0 | Status: SHIPPED | OUTPATIENT
Start: 2022-04-21 | End: 2022-04-26

## 2022-05-03 ENCOUNTER — OFFICE VISIT (OUTPATIENT)
Dept: FAMILY MEDICINE | Facility: CLINIC | Age: 22
End: 2022-05-03
Payer: COMMERCIAL

## 2022-05-03 VITALS
TEMPERATURE: 98.8 F | HEART RATE: 104 BPM | OXYGEN SATURATION: 99 % | HEIGHT: 65 IN | DIASTOLIC BLOOD PRESSURE: 80 MMHG | WEIGHT: 250 LBS | SYSTOLIC BLOOD PRESSURE: 118 MMHG | BODY MASS INDEX: 41.65 KG/M2

## 2022-05-03 DIAGNOSIS — Z87.09 HISTORY OF RECURRENT TONSILLITIS: ICD-10-CM

## 2022-05-03 DIAGNOSIS — B37.31 YEAST INFECTION OF THE VAGINA: ICD-10-CM

## 2022-05-03 DIAGNOSIS — F43.22 ADJUSTMENT DISORDER WITH ANXIOUS MOOD: ICD-10-CM

## 2022-05-03 DIAGNOSIS — Z01.818 PREOP GENERAL PHYSICAL EXAM: Primary | ICD-10-CM

## 2022-05-03 DIAGNOSIS — T88.7XXA SIDE EFFECT OF MEDICATION: ICD-10-CM

## 2022-05-03 LAB
ERYTHROCYTE [DISTWIDTH] IN BLOOD BY AUTOMATED COUNT: 14 % (ref 10–15)
HCT VFR BLD AUTO: 41.2 % (ref 35–47)
HGB BLD-MCNC: 12.6 G/DL (ref 11.7–15.7)
INR PPP: 1 (ref 0.85–1.15)
MCH RBC QN AUTO: 24.1 PG (ref 26.5–33)
MCHC RBC AUTO-ENTMCNC: 30.6 G/DL (ref 31.5–36.5)
MCV RBC AUTO: 79 FL (ref 78–100)
PLATELET # BLD AUTO: 207 10E3/UL (ref 150–450)
RBC # BLD AUTO: 5.23 10E6/UL (ref 3.8–5.2)
WBC # BLD AUTO: 9.6 10E3/UL (ref 4–11)

## 2022-05-03 PROCEDURE — 85610 PROTHROMBIN TIME: CPT | Performed by: FAMILY MEDICINE

## 2022-05-03 PROCEDURE — 36415 COLL VENOUS BLD VENIPUNCTURE: CPT | Performed by: FAMILY MEDICINE

## 2022-05-03 PROCEDURE — 85027 COMPLETE CBC AUTOMATED: CPT | Performed by: FAMILY MEDICINE

## 2022-05-03 PROCEDURE — 99214 OFFICE O/P EST MOD 30 MIN: CPT | Performed by: FAMILY MEDICINE

## 2022-05-03 RX ORDER — CEPHALEXIN 500 MG/1
500 CAPSULE ORAL 2 TIMES DAILY
Qty: 20 CAPSULE | Refills: 0 | Status: SHIPPED | OUTPATIENT
Start: 2022-05-03 | End: 2022-05-13

## 2022-05-03 RX ORDER — FLUCONAZOLE 150 MG/1
150 TABLET ORAL
Qty: 3 TABLET | Refills: 3 | Status: SHIPPED | OUTPATIENT
Start: 2022-05-03 | End: 2023-05-22

## 2022-05-03 RX ORDER — HYDROXYZINE HYDROCHLORIDE 25 MG/1
25-50 TABLET, FILM COATED ORAL EVERY 6 HOURS PRN
Qty: 60 TABLET | Refills: 1 | Status: SHIPPED | OUTPATIENT
Start: 2022-05-03 | End: 2023-03-02

## 2022-05-03 ASSESSMENT — PATIENT HEALTH QUESTIONNAIRE - PHQ9
SUM OF ALL RESPONSES TO PHQ QUESTIONS 1-9: 0
5. POOR APPETITE OR OVEREATING: NOT AT ALL

## 2022-05-03 ASSESSMENT — ANXIETY QUESTIONNAIRES
3. WORRYING TOO MUCH ABOUT DIFFERENT THINGS: NOT AT ALL
6. BECOMING EASILY ANNOYED OR IRRITABLE: NOT AT ALL
IF YOU CHECKED OFF ANY PROBLEMS ON THIS QUESTIONNAIRE, HOW DIFFICULT HAVE THESE PROBLEMS MADE IT FOR YOU TO DO YOUR WORK, TAKE CARE OF THINGS AT HOME, OR GET ALONG WITH OTHER PEOPLE: NOT DIFFICULT AT ALL
5. BEING SO RESTLESS THAT IT IS HARD TO SIT STILL: NOT AT ALL
1. FEELING NERVOUS, ANXIOUS, OR ON EDGE: NOT AT ALL
2. NOT BEING ABLE TO STOP OR CONTROL WORRYING: NOT AT ALL

## 2022-05-03 NOTE — PROGRESS NOTES
[facility]  [address] [address] [facility] [address]  Phone: [phone]  Primary Provider: Anabel Forbes  Pre-op Performing Provider: ANABEL FORBES      PREOPERATIVE EVALUATION:  Today's date: 5/3/2022    Mary Bueno is a 21 year old female who presents for a preoperative evaluation.    Surgical Information:    Surgery/Procedure: TONSILLECTOMY WITH ADENOID INPSECTION, POSSIBLE ADENOIDECTOMY  Surgery Location: [facility]   Surgeon: Moises Mckeon MD  Surgery Date: 5/13/22  Time of Surgery: 11:30am  Where patient plans to recover: At home with family  Fax number for surgical facility: Note does not need to be faxed, will be available electronically in Epic.    Type of Anesthesia Anticipated: General    Assessment & Plan     The proposed surgical procedure is considered LOW risk.    Preop general physical exam      History of recurrent tonsillitis  Reason for surgery   - CBC with platelets; Future  - INR; Future    Side effect of medication  For antibiotics   - fluconazole (DIFLUCAN) 150 MG tablet; Take 1 tablet (150 mg) by mouth every 72 hours    Yeast infection of the vagina    - fluconazole (DIFLUCAN) 150 MG tablet; Take 1 tablet (150 mg) by mouth every 72 hours         Risks and Recommendations:  The patient has the following additional risks and recommendations for perioperative complications:   - Morbid obesity (BMI >40)    Medication Instructions:  Patient is to take all scheduled medications on the day of surgery    RECOMMENDATION:  APPROVAL GIVEN to proceed with proposed procedure, without further diagnostic evaluation.      Subjective     HPI related to upcoming procedure: frequent tonsillits    Preop Questions 5/3/2022   1. Have you ever had a heart attack or stroke? No   2. Have you ever had surgery on your heart or blood vessels, such as a stent placement, a coronary artery bypass, or surgery on an artery in your head, neck, heart, or legs? No    3. Do you have chest pain with activity? No   4. Do you have a history of  heart failure? No   5. Do you currently have a cold, bronchitis or symptoms of other infection? No   6. Do you have a cough, shortness of breath, or wheezing? No   7. Do you or anyone in your family have previous history of blood clots? UNKNOWN -    8. Do you or does anyone in your family have a serious bleeding problem such as prolonged bleeding following surgeries or cuts? UNKNOWN -    9. Have you ever had problems with anemia or been told to take iron pills? UNKNOWN -    10. Have you had any abnormal blood loss such as black, tarry or bloody stools, or abnormal vaginal bleeding? No   11. Have you ever had a blood transfusion? No   12. Are you willing to have a blood transfusion if it is medically needed before, during, or after your surgery? Yes   13. Have you or any of your relatives ever had problems with anesthesia? UNKNOWN -    14. Do you have sleep apnea, excessive snoring or daytime drowsiness? No   15. Do you have any artifical heart valves or other implanted medical devices like a pacemaker, defibrillator, or continuous glucose monitor? No   16. Do you have artificial joints? No   17. Are you allergic to latex? No   18. Is there any chance that you may be pregnant? No       Health Care Directive:  Patient does not have a Health Care Directive or Living Will: Discussed advance care planning with patient; however, patient declined at this time.    Preoperative Review of :   reviewed - controlled substances reflected in medication list.      Status of Chronic Conditions:  See problem list for active medical problems.  Problems all longstanding and stable, except as noted/documented.  See ROS for pertinent symptoms related to these conditions.      Review of Systems  Constitutional, neuro, ENT, endocrine, pulmonary, cardiac, gastrointestinal, genitourinary, musculoskeletal, integument and psychiatric systems are negative,  except as otherwise noted.    Patient Active Problem List    Diagnosis Date Noted     Breakthrough bleeding on Nexplanon 06/22/2021     Priority: Medium     Added automatically from request for surgery [id number]       Endometrial thickening on ultrasound 06/22/2021     Priority: Medium     Added automatically from request for surgery [id number]       Nexplanon insertion 09/17/2020     Priority: Medium     Lot: [id number]  Exp: 10/2023    Alysha Pablo LPN  New  9/2020       Migraine without aura and without status migrainosus, not intractable 05/22/2019     Priority: Medium     Increased insulin level 05/25/2017     Priority: Medium     Current mild episode of major depressive disorder without prior episode (H) 07/11/2016     Priority: Medium     Gastroesophageal reflux disease, esophagitis presence not specified 07/11/2016     Priority: Medium     IMO Regulatory Load OCT 2020       Depression with anxiety 07/02/2016     Priority: Medium     Obesity 05/14/2014     Priority: Medium     Adjustment disorder with mixed anxiety and depressed mood 09/20/2013     Priority: Medium     Weight disorder 06/18/2012     Priority: Medium     Recurrent UTI 09/16/2011     Priority: Medium     Referred for peds uro and u/s   - normal retroperitoneal u/s   - pending VCUG - mom hasn't yet scheduled ( 12/6/11)       Health Care Home 05/06/2013     Priority: Low     *See Letters for [facility] Care Plan: My Access Plan            Past Medical History:   Diagnosis Date     Migraines      Uncomplicated asthma      Past Surgical History:   Procedure Laterality Date     ARTHROSCOPY ANKLE Right 1/26/2017    Procedure: ARTHROSCOPY ANKLE;  Surgeon: Davis Mayfield DPM;  Location: [facility]     DILATION AND CURETTAGE, HYSTEROSCOPY DIAGNOSTIC, COMBINED N/A 7/19/2021    Procedure: HYSTEROSCOPY, DIAGNOSTIC, WITH DILATION AND CURETTAGE OF UTERUS;  Surgeon: Ryan Salmeron MD;  Location: [facility] OR     NO HISTORY OF SURGERY       Current Outpatient  "Medications   Medication Sig Dispense Refill     Cholecalciferol (VITAMIN D3) 50 MCG (2000 UT) CAPS Take 1 capsule by mouth daily 90 capsule 3     clindamycin-benzoyl peroxide (BENZACLIN) 1-5 % external gel Apply topically 2 times daily 35 g 3     clotrimazole (LOTRIMIN) 1 % external cream Apply topically 2 times daily To perineum as needed 15 g 1     etonogestrel (IMPLANON/NEXPLANON) 68 MG IMPL 1 each (68 mg) by Subdermal route continuous  0     fluconazole (DIFLUCAN) 150 MG tablet Take 1 tablet (150 mg) by mouth every 72 hours 3 tablet 3     galcanezumab-gnlm (EMGALITY) 120 MG/ML injection Inject 1 mL (120 mg) Subcutaneous every 28 days MAINTENANCE DOSE 1 mL 11     ondansetron (ZOFRAN) 8 MG tablet Take 1 tablet (8 mg) by mouth every 8 hours as needed for nausea 90 tablet 1     pantoprazole (PROTONIX) 40 MG EC tablet Take 1 tablet (40 mg) by mouth daily 90 tablet 3     sertraline (ZOLOFT) 100 MG tablet Take 1 tablet (100 mg) by mouth daily 90 tablet 3     triamcinolone (KENALOG) 0.1 % external cream Apply topically 2 times daily To thighs and flank s 30 g 3     valACYclovir (VALTREX) 1000 mg tablet Take 1 tablet (1,000 mg) by mouth 2 times daily for 10 days 20 tablet 0       No Known Allergies     Social History     Tobacco Use     Smoking status: Never Smoker     Smokeless tobacco: Never Used   Substance Use Topics     Alcohol use: No     Alcohol/week: 0.0 standard drinks       History   Drug Use No         Objective     /80   Pulse 104   Temp 98.8  F (37.1  C) (Tympanic)   Ht 1.651 m (5' 5\")   Wt 113.4 kg (250 lb)   SpO2 99%   BMI 41.60 kg/m      Physical Exam    GENERAL APPEARANCE: healthy, alert and no distress     EYES: EOMI, PERRL     HENT: ear canals and TM's normal, nose and mouth without ulcers or lesions and tonsillar hypertrophy     NECK: bilateral anterior cervical adenopathy     RESP: lungs clear to auscultation - no rales, rhonchi or wheezes     CV: regular rates and rhythm, normal S1 S2, " no S3 or S4 and no murmur, click or rub     ABDOMEN:  soft, nontender, no HSM or masses and bowel sounds normal     MS: extremities normal- no gross deformities noted, no evidence of inflammation in joints, FROM in all extremities.     SKIN: no suspicious lesions or rashes     NEURO: Normal strength and tone, sensory exam grossly normal, mentation intact and speech normal     PSYCH: mentation appears normal. and affect normal/bright     LYMPHATICS: posterior cervical: no adenopathy  supraclavicular: no adenopathy    Recent Labs   Lab Test 04/11/22  1547 02/15/22  1724 03/02/21  1754 10/29/20  1745   HGB  --   --  13.7 14.3   PLT  --   --  162 182    138  --  141   POTASSIUM 4.0 3.8  --  3.6   CR 0.55 0.50  --  0.64        Diagnostics:  Labs pending at this time.  Results will be reviewed when available.   No EKG required for low risk surgery (cataract, skin procedure, breast biopsy, etc).    Revised Cardiac Risk Index (RCRI):  The patient has the following serious cardiovascular risks for perioperative complications:   - No serious cardiac risks = 0 points     RCRI Interpretation: 0 points: Class I (very low risk - 0.4% complication rate)           Signed Electronically by: Anabel Silver MD  Copy of this evaluation report is provided to requesting physician.

## 2022-05-03 NOTE — H&P (VIEW-ONLY)
[facility]  [address] [facility] [address]  Phone: [phone]  Primary Provider: Anabel Forbes  Pre-op Performing Provider: ANABEL FORBES      PREOPERATIVE EVALUATION:  Today's date: 5/3/2022    Mary Bueno is a 21 year old female who presents for a preoperative evaluation.    Surgical Information:    Surgery/Procedure: TONSILLECTOMY WITH ADENOID INPSECTION, POSSIBLE ADENOIDECTOMY  Surgery Location: [facility]   Surgeon: Moises Mckeon MD  Surgery Date: 5/13/22  Time of Surgery: 11:30am  Where patient plans to recover: At home with family  Fax number for surgical facility: Note does not need to be faxed, will be available electronically in Epic.    Type of Anesthesia Anticipated: General    Assessment & Plan     The proposed surgical procedure is considered LOW risk.    Preop general physical exam      History of recurrent tonsillitis  Reason for surgery   - CBC with platelets; Future  - INR; Future    Side effect of medication  For antibiotics   - fluconazole (DIFLUCAN) 150 MG tablet; Take 1 tablet (150 mg) by mouth every 72 hours    Yeast infection of the vagina    - fluconazole (DIFLUCAN) 150 MG tablet; Take 1 tablet (150 mg) by mouth every 72 hours         Risks and Recommendations:  The patient has the following additional risks and recommendations for perioperative complications:   - Morbid obesity (BMI >40)    Medication Instructions:  Patient is to take all scheduled medications on the day of surgery    RECOMMENDATION:  APPROVAL GIVEN to proceed with proposed procedure, without further diagnostic evaluation.      Subjective     HPI related to upcoming procedure: frequent tonsillits    Preop Questions 5/3/2022   1. Have you ever had a heart attack or stroke? No   2. Have you ever had surgery on your heart or blood vessels, such as a stent placement, a coronary artery bypass, or surgery on an artery in your head, neck, heart, or legs? No    3. Do you have chest pain with activity? No   4. Do you have a history of  heart failure? No   5. Do you currently have a cold, bronchitis or symptoms of other infection? No   6. Do you have a cough, shortness of breath, or wheezing? No   7. Do you or anyone in your family have previous history of blood clots? UNKNOWN -    8. Do you or does anyone in your family have a serious bleeding problem such as prolonged bleeding following surgeries or cuts? UNKNOWN -    9. Have you ever had problems with anemia or been told to take iron pills? UNKNOWN -    10. Have you had any abnormal blood loss such as black, tarry or bloody stools, or abnormal vaginal bleeding? No   11. Have you ever had a blood transfusion? No   12. Are you willing to have a blood transfusion if it is medically needed before, during, or after your surgery? Yes   13. Have you or any of your relatives ever had problems with anesthesia? UNKNOWN -    14. Do you have sleep apnea, excessive snoring or daytime drowsiness? No   15. Do you have any artifical heart valves or other implanted medical devices like a pacemaker, defibrillator, or continuous glucose monitor? No   16. Do you have artificial joints? No   17. Are you allergic to latex? No   18. Is there any chance that you may be pregnant? No       Health Care Directive:  Patient does not have a Health Care Directive or Living Will: Discussed advance care planning with patient; however, patient declined at this time.    Preoperative Review of :   reviewed - controlled substances reflected in medication list.      Status of Chronic Conditions:  See problem list for active medical problems.  Problems all longstanding and stable, except as noted/documented.  See ROS for pertinent symptoms related to these conditions.      Review of Systems  Constitutional, neuro, ENT, endocrine, pulmonary, cardiac, gastrointestinal, genitourinary, musculoskeletal, integument and psychiatric systems are negative,  except as otherwise noted.    Patient Active Problem List    Diagnosis Date Noted     Breakthrough bleeding on Nexplanon 06/22/2021     Priority: Medium     Added automatically from request for surgery [id number]       Endometrial thickening on ultrasound 06/22/2021     Priority: Medium     Added automatically from request for surgery [id number]       Nexplanon insertion 09/17/2020     Priority: Medium     Lot: [id number]  Exp: 10/2023    Alysha Pablo LPN  New  9/2020       Migraine without aura and without status migrainosus, not intractable 05/22/2019     Priority: Medium     Increased insulin level 05/25/2017     Priority: Medium     Current mild episode of major depressive disorder without prior episode (H) 07/11/2016     Priority: Medium     Gastroesophageal reflux disease, esophagitis presence not specified 07/11/2016     Priority: Medium     IMO Regulatory Load OCT 2020       Depression with anxiety 07/02/2016     Priority: Medium     Obesity 05/14/2014     Priority: Medium     Adjustment disorder with mixed anxiety and depressed mood 09/20/2013     Priority: Medium     Weight disorder 06/18/2012     Priority: Medium     Recurrent UTI 09/16/2011     Priority: Medium     Referred for peds uro and u/s   - normal retroperitoneal u/s   - pending VCUG - mom hasn't yet scheduled ( 12/6/11)       Health Care Home 05/06/2013     Priority: Low     *See Letters for [facility] Care Plan: My Access Plan            Past Medical History:   Diagnosis Date     Migraines      Uncomplicated asthma      Past Surgical History:   Procedure Laterality Date     ARTHROSCOPY ANKLE Right 1/26/2017    Procedure: ARTHROSCOPY ANKLE;  Surgeon: Davis Mayfield DPM;  Location: [facility]     DILATION AND CURETTAGE, HYSTEROSCOPY DIAGNOSTIC, COMBINED N/A 7/19/2021    Procedure: HYSTEROSCOPY, DIAGNOSTIC, WITH DILATION AND CURETTAGE OF UTERUS;  Surgeon: Ryan Salmeron MD;  Location: [facility] OR     NO HISTORY OF SURGERY       Current Outpatient  "Medications   Medication Sig Dispense Refill     Cholecalciferol (VITAMIN D3) 50 MCG (2000 UT) CAPS Take 1 capsule by mouth daily 90 capsule 3     clindamycin-benzoyl peroxide (BENZACLIN) 1-5 % external gel Apply topically 2 times daily 35 g 3     clotrimazole (LOTRIMIN) 1 % external cream Apply topically 2 times daily To perineum as needed 15 g 1     etonogestrel (IMPLANON/NEXPLANON) 68 MG IMPL 1 each (68 mg) by Subdermal route continuous  0     fluconazole (DIFLUCAN) 150 MG tablet Take 1 tablet (150 mg) by mouth every 72 hours 3 tablet 3     galcanezumab-gnlm (EMGALITY) 120 MG/ML injection Inject 1 mL (120 mg) Subcutaneous every 28 days MAINTENANCE DOSE 1 mL 11     ondansetron (ZOFRAN) 8 MG tablet Take 1 tablet (8 mg) by mouth every 8 hours as needed for nausea 90 tablet 1     pantoprazole (PROTONIX) 40 MG EC tablet Take 1 tablet (40 mg) by mouth daily 90 tablet 3     sertraline (ZOLOFT) 100 MG tablet Take 1 tablet (100 mg) by mouth daily 90 tablet 3     triamcinolone (KENALOG) 0.1 % external cream Apply topically 2 times daily To thighs and flank s 30 g 3     valACYclovir (VALTREX) 1000 mg tablet Take 1 tablet (1,000 mg) by mouth 2 times daily for 10 days 20 tablet 0       No Known Allergies     Social History     Tobacco Use     Smoking status: Never Smoker     Smokeless tobacco: Never Used   Substance Use Topics     Alcohol use: No     Alcohol/week: 0.0 standard drinks       History   Drug Use No         Objective     /80   Pulse 104   Temp 98.8  F (37.1  C) (Tympanic)   Ht 1.651 m (5' 5\")   Wt 113.4 kg (250 lb)   SpO2 99%   BMI 41.60 kg/m      Physical Exam    GENERAL APPEARANCE: healthy, alert and no distress     EYES: EOMI, PERRL     HENT: ear canals and TM's normal, nose and mouth without ulcers or lesions and tonsillar hypertrophy     NECK: bilateral anterior cervical adenopathy     RESP: lungs clear to auscultation - no rales, rhonchi or wheezes     CV: regular rates and rhythm, normal S1 S2, " no S3 or S4 and no murmur, click or rub     ABDOMEN:  soft, nontender, no HSM or masses and bowel sounds normal     MS: extremities normal- no gross deformities noted, no evidence of inflammation in joints, FROM in all extremities.     SKIN: no suspicious lesions or rashes     NEURO: Normal strength and tone, sensory exam grossly normal, mentation intact and speech normal     PSYCH: mentation appears normal. and affect normal/bright     LYMPHATICS: posterior cervical: no adenopathy  supraclavicular: no adenopathy    Recent Labs   Lab Test 04/11/22  1547 02/15/22  1724 03/02/21  1754 10/29/20  1745   HGB  --   --  13.7 14.3   PLT  --   --  162 182    138  --  141   POTASSIUM 4.0 3.8  --  3.6   CR 0.55 0.50  --  0.64        Diagnostics:  Labs pending at this time.  Results will be reviewed when available.   No EKG required for low risk surgery (cataract, skin procedure, breast biopsy, etc).    Revised Cardiac Risk Index (RCRI):  The patient has the following serious cardiovascular risks for perioperative complications:   - No serious cardiac risks = 0 points     RCRI Interpretation: 0 points: Class I (very low risk - 0.4% complication rate)           Signed Electronically by: Anabel Silver MD  Copy of this evaluation report is provided to requesting physician.       SSKI Counseling:  I discussed with the patient the risks of SSKI including but not limited to thyroid abnormalities, metallic taste, GI upset, fever, headache, acne, arthralgias, paraesthesias, lymphadenopathy, easy bleeding, arrhythmias, and allergic reaction.

## 2022-05-03 NOTE — PATIENT INSTRUCTIONS
Preparing for Your Surgery  Getting started  A nurse will call you to review your health history and instructions. They will give you an arrival time based on your scheduled surgery time. Please be ready to share:    Your doctor's clinic name and phone number    Your medical, surgical and anesthesia history    A list of allergies and sensitivities    A list of medicines, including herbal treatments and over-the-counter drugs    Whether the patient has a legal guardian (ask how to send us the papers in advance)  Please tell us if you're pregnant--or if there's any chance you might be pregnant. Some surgeries may injure a fetus (unborn baby), so they require a pregnancy test. Surgeries that are safe for a fetus don't always need a test, and you can choose whether to have one.   If you have a child who's having surgery, please ask for a copy of Preparing for Your Child's Surgery.    Preparing for surgery    Within 30 days of surgery: Have a pre-op exam (sometimes called an H&P, or History and Physical). This can be done at a clinic or pre-operative center.  ? If you're having a , you may not need this exam. Talk to your care team.    At your pre-op exam, talk to your care team about all medicines you take. If you need to stop any medicines before surgery, ask when to start taking them again.  ? We do this for your safety. Many medicines can make you bleed too much during surgery. Some change how well surgery (anesthesia) drugs work.    Call your insurance company to let them know you're having surgery. (If you don't have insurance, call [phone].)    Call your clinic if there's any change in your health. This includes signs of a cold or flu (sore throat, runny nose, cough, rash, fever). It also includes a scrape or scratch near the surgery site.    If you have questions on the day of surgery, call your hospital or surgery center.  COVID testing  You may need to be tested for COVID-19 before having  surgery. If so, we will give you instructions.  Eating and drinking guidelines  For your safety: Unless your surgeon tells you otherwise, follow the guidelines below.    Eat and drink as usual until 8 hours before surgery. After that, no food or milk.    Drink clear liquids until 2 hours before surgery. These are liquids you can see through, like water, Gatorade and Propel Water. You may also have black coffee and tea (no cream or milk).    Nothing by mouth within 2 hours of surgery. This includes gum, candy and breath mints.    If you drink alcohol: Stop drinking it the night before surgery.    If your care team tells you to take medicine on the morning of surgery, it's okay to take it with a sip of water.  Preventing infection    Shower or bathe the night before and morning of your surgery. Follow the instructions your clinic gave you. (If no instructions, use regular soap.)    Don't shave or clip hair near your surgery site. We'll remove the hair if needed.    Don't smoke or vape the morning of surgery. You may chew nicotine gum up to 2 hours before surgery. A nicotine patch is okay.  ? Note: Some surgeries require you to completely quit smoking and nicotine. Check with your surgeon.    Your care team will make every effort to keep you safe from infection. We will:  ? Clean our hands often with soap and water (or an alcohol-based hand rub).  ? Clean the skin at your surgery site with a special soap that kills germs.  ? Give you a special gown to keep you warm. (Cold raises the risk of infection.)  ? Wear special hair covers, masks, gowns and gloves during surgery.  ? Give antibiotic medicine, if prescribed. Not all surgeries need antibiotics.  What to bring on the day of surgery    Photo ID and insurance card    Copy of your health care directive, if you have one    Glasses and hearing aides (bring cases)  ? You can't wear contacts during surgery    Inhaler and eye drops, if you use them (tell us about these when  you arrive)    CPAP machine or breathing device, if you use them    A few personal items, if spending the night    If you have . . .  ? A pacemaker, ICD (cardiac defibrillator) or other implant: Bring the ID card.  ? An implanted stimulator: Bring the remote control.  ? A legal guardian: Bring a copy of the certified (court-stamped) guardianship papers.  Please remove any jewelry, including body piercings. Leave jewelry and other valuables at home.  If you're going home the day of surgery    You must have a responsible adult drive you home. They should stay with you overnight as well.    If you don't have someone to stay with you, and you aren't safe to go home alone, we may keep you overnight. Insurance often won't pay for this.  After surgery  If it's hard to control your pain or you need more pain medicine, please call your surgeon's office.  Questions?   If you have any questions for your care team, list them here: _________________________________________________________________________________________________________________________________________________________________________ ____________________________________ ____________________________________ ____________________________________  For informational purposes only. Not to replace the advice of your health care provider. Copyright   2003, 2019 [facility]. All rights reserved. Clinically reviewed by Chantel Benítez MD. [employer] [id number] - REV 07/21.

## 2022-05-06 NOTE — RESULT ENCOUNTER NOTE
Mary,  Your lab results were normal/stable. Please feel free to my chart or call the office with questions. Anabel Silver M.D.

## 2022-05-10 ENCOUNTER — VIRTUAL VISIT (OUTPATIENT)
Dept: NEUROLOGY | Facility: CLINIC | Age: 22
End: 2022-05-10
Payer: COMMERCIAL

## 2022-05-10 DIAGNOSIS — G43.711 INTRACTABLE CHRONIC MIGRAINE WITHOUT AURA AND WITH STATUS MIGRAINOSUS: Primary | ICD-10-CM

## 2022-05-10 PROCEDURE — 99214 OFFICE O/P EST MOD 30 MIN: CPT | Mod: 95 | Performed by: PSYCHIATRY & NEUROLOGY

## 2022-05-10 RX ORDER — ERENUMAB-AOOE 140 MG/ML
140 INJECTION, SOLUTION SUBCUTANEOUS
Qty: 1 ML | Refills: 11 | Status: SHIPPED | OUTPATIENT
Start: 2022-05-10 | End: 2023-02-09

## 2022-05-10 ASSESSMENT — HEADACHE IMPACT TEST (HIT 6)
WHEN YOU HAVE A HEADACHE HOW OFTEN DO YOU WISH YOU COULD LIE DOWN: VERY OFTEN
HOW OFTEN DO HEADACHES LIMIT YOUR DAILY ACTIVITIES: VERY OFTEN
HOW OFTEN DID HEADACHS LIMIT CONCENTRATION ON WORK OR DAILY ACTIVITY: VERY OFTEN
WHEN YOU HAVE HEADACHES HOW OFTEN IS THE PAIN SEVERE: VERY OFTEN
HIT6 TOTAL SCORE: 66
WHEN YOU HAVE HEADACHES HOW OFTEN IS THE PAIN SEVERE: VERY OFTEN
WHEN YOU HAVE A HEADACHE HOW OFTEN DO YOU WISH YOU COULD LIE DOWN: VERY OFTEN
HOW OFTEN HAVE YOU FELT TOO TIRED TO WORK BECAUSE OF YOUR HEADACHES: VERY OFTEN
HOW OFTEN DO HEADACHES LIMIT YOUR DAILY ACTIVITIES: VERY OFTEN
HOW OFTEN HAVE YOU FELT FED UP OR IRRITATED BECAUSE OF YOUR HEADACHES: VERY OFTEN
HOW OFTEN HAVE YOU FELT TOO TIRED TO WORK BECAUSE OF YOUR HEADACHES: VERY OFTEN
HIT6 TOTAL SCORE: 66
HOW OFTEN DID HEADACHS LIMIT CONCENTRATION ON WORK OR DAILY ACTIVITY: VERY OFTEN
HOW OFTEN HAVE YOU FELT FED UP OR IRRITATED BECAUSE OF YOUR HEADACHES: VERY OFTEN

## 2022-05-10 ASSESSMENT — MIGRAINE DISABILITY ASSESSMENT (MIDAS)
HOW MANY DAYS DID YOU NOT DO HOUSEWORK BECAUSE OF HEADACHES: 20
ON A SCALE FROM 0-10 ON AVERAGE HOW PAINFUL WERE HEADACHES: 7
HOW MANY DAYS WAS YOUR PRODUCTIVITY CUT IN HALF BECAUSE OF HEADACHES: 45
HOW MANY DAYS WAS HOUSEWORK PRODUCTIVITY CUT IN HALF DUE TO HEADACHES: 20
HOW MANY DAYS DID YOU MISS WORK OR SCHOOL BECAUSE OF HEADACHES: 30
HOW OFTEN WERE SOCIAL ACTIVITIES MISSED DUE TO HEADACHES: 30
TOTAL SCORE: 145
HOW MANY DAYS IN THE PAST 3 MONTHS HAVE YOU HAD A HEADACHE: 90

## 2022-05-10 NOTE — PROGRESS NOTES
Jodie is a 21 year old who is being evaluated via a billable video visit.      How would you like to obtain your AVS? MyChart  If the video visit is dropped, the invitation should be resent by: Send to e-mail at: [email or web address]  Will anyone else be joining your video visit? No    Video Start Time: 2:38 PM  Video-Visit Details    Type of service:  Video Visit    Video End Time:     Originating Location (pt. Location): Home    Distant Location (provider location):  [facility] NEUROLOGY [facility]     Platform used for Video Visit: [facility]    Mary Bueno MRN# [id number]   Age: 21 year old YOB: 2000     Requesting physician: Janna Puri*  Anabel Silver            Assessment and Plan:     (G43.711) Intractable chronic migraine without aura and with status migrainosus  (primary encounter diagnosis)  Comment: The patient had a good response to Emgality but the response waned, we will switch to Aimovig for a better response. If insurance won't approve can try Ajovy.   Plan:  1. Start  erenumab-aooe (AIMOVIG) 140 MG/ML injection monthly  2. Stop Emgality  3. Use ubrogepant (UBRELVY) 100 MG tablet prn         Janna Aragon MD          History of Present Illness:   CC: Recheck      Jodie is a 21 year old who hasn't been seen since 2020. She reports she was doing well for a while with Emgality but the last 6 months it has gone back to head hurting every single day. She started the use at end of 2020. She felt it took 3-4 months to build up and then it was working really well. She 'was back to normal self' with one migraine a month for 1-3 days. In November/December 2021 she started to get worse. Now back to daily headache. She has noticed certain smells make it worse. No clear tirgger.    She did have COVID and vaccine but neither of which match with timing of worsening.     Still injecting Emgality. She injects 15th of every month. No effect  at this point except that the injection site is sore for a day or too.     Graduated high school. 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 1 little boy.     Previous Treatment Trials:  Propranolol (unclear time)  Depakote ( a few weeks)  Amitriptyline (unclear time)  Topiramate (1.5 months)  Acetazoloamide  Emgality     Abortive attempts:  Frova  Rizatriptan  Zomig  Sumatriptan  Dilaudid-prn  Metoclopramide           Physical Exam:     Awake, alert, no acute distress         Pertinent History/Data for appointment:

## 2022-05-10 NOTE — LETTER
5/10/2022       RE: Mary Bueno  [address]  [facility] [address]     Dear Colleague,    Thank you for referring your patient, Mary Bueno, to the [facility] NEUROLOGY CL[facility]IC [place] [facility]. Please see a copy of my visit note below.          [facility]    Mary Bueno MRN# [id number]   Age: 21 year old YOB: 2000     Requesting physician: Janna Puri*  Anabel Silver            Assessment and Plan:     (G43.711) Intractable chronic migraine without aura and with status migrainosus  (primary encounter diagnosis)  Comment: The patient had a good response to Emgality but the response waned, we will switch to Aimovig for a better response. If insurance won't approve can try Ajovy.   Plan:  1. Start  erenumab-aooe (AIMOVIG) 140 MG/ML injection monthly  2. Stop Emgality  3. Use ubrogepant (UBRELVY) 100 MG tablet prn         Janna Aragon MD          History of Present Illness:   CC: Rosalbajordyn      Jodie is a 21 year old who hasn't been seen since 2020. She reports she was doing well for a while with Emgality but the last 6 months it has gone back to head hurting every single day. She started the use at end of 2020. She felt it took 3-4 months to build up and then it was working really well. She 'was back to normal self' with one migraine a month for 1-3 days. In November/December 2021 she started to get worse. Now back to daily headache. She has noticed certain smells make it worse. No clear tirgger.    She did have COVID and vaccine but neither of which match with timing of worsening.     Still injecting Emgality. She injects 15th of every month. No effect at this point except that the injection site is sore for a day or too.     Graduated high school. 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     Previous Treatment  Trials:  Propranolol (unclear time)  Depakote ( a few weeks)  Amitriptyline (unclear time)  Topiramate (1.5 months)  Acetazoloamide  Emgality     Abortive attempts:  Frova  Rizatriptan  Zomig  Sumatriptan  Dilaudid-prn  Metoclopramide           Physical Exam:     Awake, alert, no acute distress         Pertinent History/Data for appointment:           Sincerely,    Janna Aragon MD

## 2022-05-11 ENCOUNTER — LAB (OUTPATIENT)
Dept: FAMILY MEDICINE | Facility: CLINIC | Age: 22
End: 2022-05-11
Attending: OTOLARYNGOLOGY
Payer: COMMERCIAL

## 2022-05-11 DIAGNOSIS — Z87.09 HISTORY OF STREP PHARYNGITIS: ICD-10-CM

## 2022-05-11 DIAGNOSIS — J35.1 TONSILLAR HYPERTROPHY: ICD-10-CM

## 2022-05-11 PROCEDURE — U0005 INFEC AGEN DETEC AMPLI PROBE: HCPCS

## 2022-05-11 PROCEDURE — U0003 INFECTIOUS AGENT DETECTION BY NUCLEIC ACID (DNA OR RNA); SEVERE ACUTE RESPIRATORY SYNDROME CORONAVIRUS 2 (SARS-COV-2) (CORONAVIRUS DISEASE [COVID-19]), AMPLIFIED PROBE TECHNIQUE, MAKING USE OF HIGH THROUGHPUT TECHNOLOGIES AS DESCRIBED BY CMS-2020-01-R: HCPCS

## 2022-05-12 ENCOUNTER — ANESTHESIA EVENT (OUTPATIENT)
Dept: SURGERY | Facility: CLINIC | Age: 22
End: 2022-05-12
Payer: COMMERCIAL

## 2022-05-12 ENCOUNTER — TELEPHONE (OUTPATIENT)
Dept: NEUROLOGY | Facility: CLINIC | Age: 22
End: 2022-05-12
Payer: COMMERCIAL

## 2022-05-12 LAB — SARS-COV-2 RNA RESP QL NAA+PROBE: NEGATIVE

## 2022-05-12 NOTE — ANESTHESIA PREPROCEDURE EVALUATION
Anesthesia Pre-Procedure Evaluation    Patient: Mary Bueno   MRN: [id number] : 2000        Procedure : Procedure(s):  TONSILLECTOMY WITH ADENOID INPSECTION, POSSIBLE ADENOIDECTOMY          Past Medical History:   Diagnosis Date     Migraines      Uncomplicated asthma       Past Surgical History:   Procedure Laterality Date     ARTHROSCOPY ANKLE Right 2017    Procedure: ARTHROSCOPY ANKLE;  Surgeon: Davis Mayfield DPM;  Location: [facility] OR     DILATION AND CURETTAGE, HYSTEROSCOPY DIAGNOSTIC, COMBINED N/A 2021    Procedure: HYSTEROSCOPY, DIAGNOSTIC, WITH DILATION AND CURETTAGE OF UTERUS;  Surgeon: Ryan Salmeron MD;  Location: [facility] OR     NO HISTORY OF SURGERY        No Known Allergies   Social History     Tobacco Use     Smoking status: Never Smoker     Smokeless tobacco: Never Used   Substance Use Topics     Alcohol use: No     Alcohol/week: 0.0 standard drinks      Wt Readings from Last 1 Encounters:   22 113.4 kg (250 lb) (>99 %, Z= 2.50)*     * Growth percentiles are based on CDC (Girls, 2-20 Years) data.        Anesthesia Evaluation   Pt has had prior anesthetic. Type: General and MAC.    No history of anesthetic complications       ROS/MED HX  ENT/Pulmonary:     (+) Intermittent, asthma     Neurologic:     (+) migraines,     Cardiovascular:  - neg cardiovascular ROS     METS/Exercise Tolerance:     Hematologic:  - neg hematologic  ROS     Musculoskeletal:  - neg musculoskeletal ROS     GI/Hepatic:     (+) GERD, Asymptomatic on medication,     Renal/Genitourinary:  - neg Renal ROS     Endo: Comment: Morbid obesity    (+) Obesity,     Psychiatric/Substance Use:     (+) psychiatric history anxiety and depression     Infectious Disease:       Malignancy:  - neg malignancy ROS     Other:  - neg other ROS          Physical Exam    Airway  airway exam normal           Respiratory Devices and Support         Dental  no notable dental history         Cardiovascular   cardiovascular  exam normal          Pulmonary   pulmonary exam normal                OUTSIDE LABS:  CBC:   Lab Results   Component Value Date    WBC 9.6 05/03/2022    WBC 9.0 03/02/2021    HGB 12.6 05/03/2022    HGB 13.7 03/02/2021    HCT 41.2 05/03/2022    HCT 42.2 03/02/2021     05/03/2022     03/02/2021     BMP:   Lab Results   Component Value Date     04/11/2022     02/15/2022    POTASSIUM 4.0 04/11/2022    POTASSIUM 3.8 02/15/2022    CHLORIDE 104 04/11/2022    CHLORIDE 105 02/15/2022    CO2 25 04/11/2022    CO2 30 02/15/2022    BUN 12 04/11/2022    BUN 8 02/15/2022    CR 0.55 04/11/2022    CR 0.50 02/15/2022    GLC 80 04/11/2022    GLC 99 02/15/2022     COAGS:   Lab Results   Component Value Date    PTT 29 05/24/2017    INR 1.00 05/03/2022    FIBR 583 (H) 05/24/2017     POC:   Lab Results   Component Value Date    HCG Negative 07/19/2021    HCGS Negative 06/10/2018     HEPATIC:   Lab Results   Component Value Date    ALBUMIN 3.5 04/11/2022    PROTTOTAL 8.5 04/11/2022    ALT 21 04/11/2022    AST 12 04/11/2022    ALKPHOS 100 04/11/2022    BILITOTAL 0.2 04/11/2022     OTHER:   Lab Results   Component Value Date    A1C 5.1 09/21/2018    SHARONA 9.0 04/11/2022    LIPASE 130 10/29/2020    TSH 1.20 09/22/2020    CRP <2.9 09/22/2020    SED 9 09/22/2020       Anesthesia Plan    ASA Status:  3   NPO Status:  NPO Appropriate    Anesthesia Type: General.     - Airway: ETT   Induction: Intravenous.   Maintenance: Balanced.        Consents    Anesthesia Plan(s) and associated risks, benefits, and realistic alternatives discussed. Questions answered and patient/representative(s) expressed understanding.     - Discussed: Risks, Benefits and Alternatives for BOTH SEDATION and the PROCEDURE were discussed     - Discussed with:  Patient, Parent (Mother and/or Father)         Postoperative Care    Pain management: IV analgesics, Oral pain medications.   PONV prophylaxis: Ondansetron (or other 5HT-3), Dexamethasone or  Solumedrol     Comments:                ERMELINDA Ramírez CRNA

## 2022-05-12 NOTE — TELEPHONE ENCOUNTER
Prior Authorization Retail Medication Request    Medication/Dose:ubrogepant (UBRELVY) 100 MG tablet   ICD code (if different than what is on RX): [G43.711]  Previously Tried and Failed:  See Chart  Rationale:  See Chart    Insurance Name:  KEKE EVE  Insurance ID:  [id number]      Pharmacy Information (if different than what is on RX)  Name:  [facility]  Phone:  [phone]

## 2022-05-13 ENCOUNTER — ANESTHESIA (OUTPATIENT)
Dept: SURGERY | Facility: CLINIC | Age: 22
End: 2022-05-13
Payer: COMMERCIAL

## 2022-05-13 ENCOUNTER — HOSPITAL ENCOUNTER (OUTPATIENT)
Facility: CLINIC | Age: 22
Discharge: HOME | End: 2022-05-13
Attending: OTOLARYNGOLOGY | Admitting: OTOLARYNGOLOGY
Payer: COMMERCIAL

## 2022-05-13 VITALS
WEIGHT: 250 LBS | DIASTOLIC BLOOD PRESSURE: 71 MMHG | SYSTOLIC BLOOD PRESSURE: 127 MMHG | TEMPERATURE: 97.7 F | HEIGHT: 65 IN | BODY MASS INDEX: 41.65 KG/M2 | RESPIRATION RATE: 16 BRPM | HEART RATE: 73 BPM | OXYGEN SATURATION: 93 %

## 2022-05-13 PROCEDURE — 250N000011 HC RX IP 250 OP 636: Performed by: NURSE ANESTHETIST, CERTIFIED REGISTERED

## 2022-05-13 PROCEDURE — 360N000075 HC SURGERY LEVEL 2, PER MIN: Performed by: OTOLARYNGOLOGY

## 2022-05-13 PROCEDURE — 370N000017 HC ANESTHESIA TECHNICAL FEE, PER MIN: Performed by: OTOLARYNGOLOGY

## 2022-05-13 PROCEDURE — 250N000025 HC SEVOFLURANE, PER MIN: Performed by: OTOLARYNGOLOGY

## 2022-05-13 PROCEDURE — 999N000141 HC STATISTIC PRE-PROCEDURE NURSING ASSESSMENT: Performed by: OTOLARYNGOLOGY

## 2022-05-13 PROCEDURE — 42821 REMOVE TONSILS AND ADENOIDS: CPT | Performed by: OTOLARYNGOLOGY

## 2022-05-13 PROCEDURE — 88300 SURGICAL PATH GROSS: CPT | Mod: TC | Performed by: OTOLARYNGOLOGY

## 2022-05-13 PROCEDURE — 250N000013 HC RX MED GY IP 250 OP 250 PS 637: Performed by: NURSE ANESTHETIST, CERTIFIED REGISTERED

## 2022-05-13 PROCEDURE — 710N000009 HC RECOVERY PHASE 1, LEVEL 1, PER MIN: Performed by: OTOLARYNGOLOGY

## 2022-05-13 PROCEDURE — 272N000001 HC OR GENERAL SUPPLY STERILE: Performed by: OTOLARYNGOLOGY

## 2022-05-13 PROCEDURE — 710N000012 HC RECOVERY PHASE 2, PER MINUTE: Performed by: OTOLARYNGOLOGY

## 2022-05-13 PROCEDURE — 250N000009 HC RX 250: Performed by: NURSE ANESTHETIST, CERTIFIED REGISTERED

## 2022-05-13 PROCEDURE — 250N000013 HC RX MED GY IP 250 OP 250 PS 637: Performed by: OTOLARYNGOLOGY

## 2022-05-13 PROCEDURE — 250N000009 HC RX 250: Performed by: OTOLARYNGOLOGY

## 2022-05-13 PROCEDURE — 258N000003 HC RX IP 258 OP 636: Performed by: NURSE ANESTHETIST, CERTIFIED REGISTERED

## 2022-05-13 RX ORDER — OXYCODONE HYDROCHLORIDE 5 MG/1
10 TABLET ORAL EVERY 4 HOURS PRN
Status: DISCONTINUED | OUTPATIENT
Start: 2022-05-13 | End: 2022-05-13 | Stop reason: HOSPADM

## 2022-05-13 RX ORDER — ACETAMINOPHEN 325 MG/1
975 TABLET ORAL ONCE
Status: DISCONTINUED | OUTPATIENT
Start: 2022-05-13 | End: 2022-05-13

## 2022-05-13 RX ORDER — GLYCOPYRROLATE 0.2 MG/ML
INJECTION, SOLUTION INTRAMUSCULAR; INTRAVENOUS PRN
Status: DISCONTINUED | OUTPATIENT
Start: 2022-05-13 | End: 2022-05-13

## 2022-05-13 RX ORDER — FENTANYL CITRATE 50 UG/ML
INJECTION, SOLUTION INTRAMUSCULAR; INTRAVENOUS PRN
Status: DISCONTINUED | OUTPATIENT
Start: 2022-05-13 | End: 2022-05-13

## 2022-05-13 RX ORDER — SODIUM CHLORIDE, SODIUM LACTATE, POTASSIUM CHLORIDE, CALCIUM CHLORIDE 600; 310; 30; 20 MG/100ML; MG/100ML; MG/100ML; MG/100ML
INJECTION, SOLUTION INTRAVENOUS CONTINUOUS
Status: DISCONTINUED | OUTPATIENT
Start: 2022-05-13 | End: 2022-05-13 | Stop reason: HOSPADM

## 2022-05-13 RX ORDER — DEXAMETHASONE SODIUM PHOSPHATE 4 MG/ML
INJECTION, SOLUTION INTRA-ARTICULAR; INTRALESIONAL; INTRAMUSCULAR; INTRAVENOUS; SOFT TISSUE PRN
Status: DISCONTINUED | OUTPATIENT
Start: 2022-05-13 | End: 2022-05-13

## 2022-05-13 RX ORDER — LIDOCAINE HYDROCHLORIDE 10 MG/ML
INJECTION, SOLUTION INFILTRATION; PERINEURAL PRN
Status: DISCONTINUED | OUTPATIENT
Start: 2022-05-13 | End: 2022-05-13

## 2022-05-13 RX ORDER — FENTANYL CITRATE 50 UG/ML
50 INJECTION, SOLUTION INTRAMUSCULAR; INTRAVENOUS EVERY 5 MIN PRN
Status: DISCONTINUED | OUTPATIENT
Start: 2022-05-13 | End: 2022-05-13 | Stop reason: HOSPADM

## 2022-05-13 RX ORDER — HYDROMORPHONE HCL IN WATER/PF 6 MG/30 ML
0.4 PATIENT CONTROLLED ANALGESIA SYRINGE INTRAVENOUS EVERY 5 MIN PRN
Status: DISCONTINUED | OUTPATIENT
Start: 2022-05-13 | End: 2022-05-13 | Stop reason: HOSPADM

## 2022-05-13 RX ORDER — ACETAMINOPHEN 325 MG/1
975 TABLET ORAL ONCE
Status: COMPLETED | OUTPATIENT
Start: 2022-05-13 | End: 2022-05-13

## 2022-05-13 RX ORDER — MAGNESIUM SULFATE HEPTAHYDRATE 40 MG/ML
2 INJECTION, SOLUTION INTRAVENOUS ONCE
Status: COMPLETED | OUTPATIENT
Start: 2022-05-13 | End: 2022-05-13

## 2022-05-13 RX ORDER — HYDROXYZINE HYDROCHLORIDE 50 MG/1
50 TABLET, FILM COATED ORAL EVERY 6 HOURS PRN
Status: DISCONTINUED | OUTPATIENT
Start: 2022-05-13 | End: 2022-05-13 | Stop reason: HOSPADM

## 2022-05-13 RX ORDER — ONDANSETRON 4 MG/1
4 TABLET, ORALLY DISINTEGRATING ORAL EVERY 30 MIN PRN
Status: DISCONTINUED | OUTPATIENT
Start: 2022-05-13 | End: 2022-05-13 | Stop reason: HOSPADM

## 2022-05-13 RX ORDER — ONDANSETRON 2 MG/ML
4 INJECTION INTRAMUSCULAR; INTRAVENOUS EVERY 30 MIN PRN
Status: DISCONTINUED | OUTPATIENT
Start: 2022-05-13 | End: 2022-05-13 | Stop reason: HOSPADM

## 2022-05-13 RX ORDER — HYDROXYZINE HYDROCHLORIDE 25 MG/1
25 TABLET, FILM COATED ORAL EVERY 6 HOURS PRN
Status: DISCONTINUED | OUTPATIENT
Start: 2022-05-13 | End: 2022-05-13 | Stop reason: HOSPADM

## 2022-05-13 RX ORDER — LIDOCAINE HYDROCHLORIDE AND EPINEPHRINE BITARTRATE 20; .01 MG/ML; MG/ML
INJECTION, SOLUTION SUBCUTANEOUS PRN
Status: DISCONTINUED | OUTPATIENT
Start: 2022-05-13 | End: 2022-05-13 | Stop reason: HOSPADM

## 2022-05-13 RX ORDER — PROPOFOL 10 MG/ML
INJECTION, EMULSION INTRAVENOUS PRN
Status: DISCONTINUED | OUTPATIENT
Start: 2022-05-13 | End: 2022-05-13

## 2022-05-13 RX ORDER — MEPERIDINE HYDROCHLORIDE 25 MG/ML
INJECTION INTRAMUSCULAR; INTRAVENOUS; SUBCUTANEOUS PRN
Status: DISCONTINUED | OUTPATIENT
Start: 2022-05-13 | End: 2022-05-13

## 2022-05-13 RX ORDER — DIMENHYDRINATE 50 MG/ML
25 INJECTION, SOLUTION INTRAMUSCULAR; INTRAVENOUS
Status: DISCONTINUED | OUTPATIENT
Start: 2022-05-13 | End: 2022-05-13 | Stop reason: HOSPADM

## 2022-05-13 RX ORDER — MEPERIDINE HYDROCHLORIDE 25 MG/ML
12.5 INJECTION INTRAMUSCULAR; INTRAVENOUS; SUBCUTANEOUS
Status: DISCONTINUED | OUTPATIENT
Start: 2022-05-13 | End: 2022-05-13 | Stop reason: HOSPADM

## 2022-05-13 RX ORDER — ONDANSETRON 2 MG/ML
INJECTION INTRAMUSCULAR; INTRAVENOUS PRN
Status: DISCONTINUED | OUTPATIENT
Start: 2022-05-13 | End: 2022-05-13

## 2022-05-13 RX ORDER — LIDOCAINE 40 MG/G
CREAM TOPICAL
Status: DISCONTINUED | OUTPATIENT
Start: 2022-05-13 | End: 2022-05-13 | Stop reason: HOSPADM

## 2022-05-13 RX ADMIN — PROPOFOL 200 MG: 10 INJECTION, EMULSION INTRAVENOUS at 11:56

## 2022-05-13 RX ADMIN — MAGNESIUM SULFATE HEPTAHYDRATE 2 G: 40 INJECTION, SOLUTION INTRAVENOUS at 11:52

## 2022-05-13 RX ADMIN — FENTANYL CITRATE 50 MCG: 50 INJECTION, SOLUTION INTRAMUSCULAR; INTRAVENOUS at 13:03

## 2022-05-13 RX ADMIN — FENTANYL CITRATE 100 MCG: 50 INJECTION, SOLUTION INTRAMUSCULAR; INTRAVENOUS at 11:56

## 2022-05-13 RX ADMIN — SODIUM CHLORIDE, POTASSIUM CHLORIDE, SODIUM LACTATE AND CALCIUM CHLORIDE: 600; 310; 30; 20 INJECTION, SOLUTION INTRAVENOUS at 10:57

## 2022-05-13 RX ADMIN — ONDANSETRON 4 MG: 2 INJECTION INTRAMUSCULAR; INTRAVENOUS at 14:32

## 2022-05-13 RX ADMIN — DEXAMETHASONE SODIUM PHOSPHATE 10 MG: 4 INJECTION, SOLUTION INTRA-ARTICULAR; INTRALESIONAL; INTRAMUSCULAR; INTRAVENOUS; SOFT TISSUE at 11:56

## 2022-05-13 RX ADMIN — HYDROMORPHONE HYDROCHLORIDE 0.4 MG: 0.2 INJECTION, SOLUTION INTRAMUSCULAR; INTRAVENOUS; SUBCUTANEOUS at 13:34

## 2022-05-13 RX ADMIN — SUGAMMADEX 100 MG: 100 INJECTION, SOLUTION INTRAVENOUS at 12:36

## 2022-05-13 RX ADMIN — HYDROMORPHONE HYDROCHLORIDE 0.4 MG: 0.2 INJECTION, SOLUTION INTRAMUSCULAR; INTRAVENOUS; SUBCUTANEOUS at 13:16

## 2022-05-13 RX ADMIN — HYDROMORPHONE HYDROCHLORIDE 1 MG: 1 INJECTION, SOLUTION INTRAMUSCULAR; INTRAVENOUS; SUBCUTANEOUS at 12:03

## 2022-05-13 RX ADMIN — MEPERIDINE HYDROCHLORIDE 25 MG: 25 INJECTION, SOLUTION INTRAMUSCULAR; INTRAVENOUS; SUBCUTANEOUS at 12:03

## 2022-05-13 RX ADMIN — LIDOCAINE HYDROCHLORIDE 100 MG: 10 INJECTION, SOLUTION INFILTRATION; PERINEURAL at 11:56

## 2022-05-13 RX ADMIN — MIDAZOLAM 2 MG: 1 INJECTION INTRAMUSCULAR; INTRAVENOUS at 11:52

## 2022-05-13 RX ADMIN — ONDANSETRON 4 MG: 2 INJECTION INTRAMUSCULAR; INTRAVENOUS at 12:34

## 2022-05-13 RX ADMIN — HYDROMORPHONE HYDROCHLORIDE 0.4 MG: 0.2 INJECTION, SOLUTION INTRAMUSCULAR; INTRAVENOUS; SUBCUTANEOUS at 13:41

## 2022-05-13 RX ADMIN — HYDROMORPHONE HYDROCHLORIDE 0.4 MG: 0.2 INJECTION, SOLUTION INTRAMUSCULAR; INTRAVENOUS; SUBCUTANEOUS at 13:24

## 2022-05-13 RX ADMIN — LIDOCAINE HYDROCHLORIDE 0.1 ML: 10 INJECTION, SOLUTION EPIDURAL; INFILTRATION; INTRACAUDAL; PERINEURAL at 10:57

## 2022-05-13 RX ADMIN — ROCURONIUM BROMIDE 40 MG: 50 INJECTION, SOLUTION INTRAVENOUS at 11:56

## 2022-05-13 RX ADMIN — GLYCOPYRROLATE 0.1 MG: 0.2 INJECTION, SOLUTION INTRAMUSCULAR; INTRAVENOUS at 11:56

## 2022-05-13 RX ADMIN — GLYCOPYRROLATE 0.1 MG: 0.2 INJECTION, SOLUTION INTRAMUSCULAR; INTRAVENOUS at 11:52

## 2022-05-13 RX ADMIN — ACETAMINOPHEN 975 MG: 325 TABLET ORAL at 10:47

## 2022-05-13 RX ADMIN — OXYCODONE HYDROCHLORIDE 10 MG: 5 TABLET ORAL at 13:50

## 2022-05-13 RX ADMIN — FENTANYL CITRATE 50 MCG: 50 INJECTION, SOLUTION INTRAMUSCULAR; INTRAVENOUS at 13:09

## 2022-05-13 NOTE — PROCEDURES
PREOPERATIVE DIAGNOSES:   1. Chronic tonsillitis.   2. Tonsillar and adenoid hypertrophy.   POSTOPERATIVE DIAGNOSES:   1. Chronic tonsillitis.   2. Tonsillar and adenoid hypertrophy.   PROCEDURE PERFORMED: Tonsillectomy and adenoidectomy.   SURGEON: Zach Cross MD   ASSISTANT: None  BLOOD LOSS: 5 mL.   COMPLICATIONS: None.   SPECIMENS: None.   ANESTHESIA: GETA.   INDICATIONS: Mary Bueno presented to me with a longstanding history of chronic tonsillitis. In addition, the patient had constant nasal airway obstruction due to adenoid hypertrophy as well, and therefore my recommendation was for surgery. Preoperatively, the risks discussed included the risks of infection, bleeding, the risks of general anesthesia. Also, the possibility of need for emergent return to the operating room was discussed. They understood and wished to proceed.   OPERATIVE PROCEDURE: After being taken to the operating room and induction of general endotracheal tube anesthesia, the bed was rotated 90 degrees and a shoulder roll and head turban were placed. I suspended the patient from the Quinton stand using a Evan-Shamar mouthgag, and I grasped the right tonsil with an Allis forceps and retracted medially and performed subcapsular dissection utilizing monopolar cautery, and the right tonsil came out very smoothly. I then turned my attention to the left side, once again using an Allis forceps to grasp it and retract it medially, and then I performed subcapsular dissection, and the left tonsil also came out very smoothly. I released the mouthgag for 2 minutes to allow recirculation of blood to the tongue.   I resuspended the patient from the Quinton stand using a Evan-Shamar mouthgag, and then slipped a small soft catheter through the right nasal cavity out of the mouth to retract the soft palate forward. After I did this, I inspected the nasopharynx. The patient had tremendous amounts of adenoid tissue completely filling the nasopharynx.  Therefore, using a suction cautery performed adenoidectomy by cauterizing the adenoid tissue and suctioning away the fulgurated material.  I slowly made my way up the back wall of the nasopharynx until I reached the posterior nasal choanae bilaterally. The adenoid tissue was large enough that it was protruding into the posterior nasal cavity, and all of this was tediously suctioned posteriorly and cauterized away. Eventually I completely cleared the posterior nasal choanae bilaterally and had an unobstructed view of the posterior nasal cavity, and the adenoidectomy was complete. I removed the catheter from the mouth and reinspected the tonsil beds and there was good hemostasis. The bed was rotated 90 degrees after I removed the shoulder roll and head turban, and the patient was awakened, extubated and sent to the recovery room in good condition.

## 2022-05-13 NOTE — DISCHARGE INSTRUCTIONS
Postoperative Care for Tonsillectomy (with or without adenoidectomy)    Recovery - There are a handful of issues that routinely occur during recover that should be anticipated during your recovery.    The pain and swelling almost always gets worse before it gets better, this is normal.  Usually it peaks 3 to 5 days after the surgery, and then begins improving at 7 to 8 days after surgery.  Of course, this is variable from person to person.  The only dietary restriction is avoidance of hard or crunchy things until I see you in follow up.  If it makes a noise when you bite it, it is too hard.  Although it is good to begin eating again from day one, it is not unusual to not eat for several days after the procedure.  The most important thing is staying hydrated.  Drink fluids with electrolytes if possible, such as sports drinks.  The pain medication you were sent home with can make some people very nauseated.  To minimize this, avoid taking it on an empty stomach, or take smaller does with greater frequency.  For example if your dose is 2 teaspoons every four hours, try taking one teaspoon every two hours, etc.  Antibiotic are sometimes given after surgery, not to prevent infection, but some research shows that it helps to decrease pain.  This is not absolutely proven, and therefore is not absolutely necessary.   Try to stay ahead of the pain.  In other words, do not wait for pain medication to completely wear off before taking more pain medicine.  Instead, take the medication every 4 to 6 hours, even if it requires setting an alarm clock at night.  This is especially helpful during the first 5 days.  The uvula ( the small hanging object in the back of your mouth) frequently swells up after tonsillectomy, but will go back to normal.  This swelling can temporarily cause the sensation of something being stuck in your throat, it will go away with recovery.  Also, because of the arrangement of nerves under where the tonsils  were, sharp ear pain is very common during recovery, and will also go away with recovery.   With adenoidectomy, a very strong and foul-smelling odor can occur about 4-7 days after surgery.  This fades rapidly, and unless there is an associated fever no antibiotics are necessary.  It is very common after tonsillectomy to experience ear pain. This is due to nerves on the side of the throat becoming inflamed, and causing the perception of sudden episodes of ear pain.  This can be controlled with the same pain medication given for the surgery.     Activity - Avoid heavy lifting (greater than 20 pounds), strenuous exercise, or extremely cold environments until the follow up appointment.  Also, try to sleep with your head elevated.  An irritated cough from the breathing tube is fairly normal after surgery.    Medications - Except blood thinners, almost all medication can be re-started after tonsillectomy.      Complications - Bleeding is by far the most common complication after tonsillectomy.  If there are a few small drops or streaks of blood in the saliva that then goes away, this can be conservatively watched.  Gentle gargling with the ice water can also help stop this minor bleeding.  However, if the bleeding is persistent, or heavy bleeding occurs, do not hesitate.  Go to the emergency room to be evaluated.    Follow up - I like to see my patients about 2 weeks after the procedure to make sure that everything is healing appropriately.  Occasionally, there can be some longer - lasting side effects of surgery such as abnormal tongue sensations, or unusual swallowing.  However, if everything is healing well, the 2 week postoperative visit is all that will be necessary.    If there are any questions or issues with the above, or if there are other issues that concern you, always feel free to call the clinic and I am happy to speak with you as soon as I can.    Josh Cross MD   Otolaryngology  [facility]  [phone] After hours, [facility] option    Tonsillectomy and Adenoidectomy    What is a tonsillectomy and adenoidectomy?    Tonsillectomy is removal of the tonsils. Adenoidectomy is removal of the adenoids. Tonsils and adenoids are lumps of tissue at the back of the throat. The tonsils and adenoids fight infection. Your child may need the tonsillectomy if he has many bad colds, sore throats, or ear infections. Tonsillectomy and adenoidectomy (T&A) are often done together.    What can I expect after Surgery?    It is common to have an upset stomach and vomiting during the first 24 hours after surgery.    Your child s throat may be sore for two weeks, especially when eating. The soreness may get better after a few days and then get worse again. Your child s voice may change a little after surgery.    Ear pain is common, often when swallowing, because the ear and throat have a common sensory nerve. Jaw spasms, or uncontrollable movement of the jaw, may also occur and cause pain.    Neck soreness is common after an adenoidectomy and usually last about one week.    Your child will have bad breath for a few weeks because your child s throat is swollen, snoring is common after surgery but should go away after about two weeks.    How should I care for my child?    Encourage your child to drink plenty of liquids (at least 2 -3 ounces per hour)  keeping the throat moist decreases discomfort and prevents dehydration( a  dangerous condition in which the body gets dried out.)    Give pain medication regularly within the limits directed by your doctor. Give  it before bed and first thing after waking in the morning. Try to give the   pain medication 30 minutes before meals to help make swallowing easier.    To prevent bleeding, avoid coughing, nose-blowing, clearing throat, and   spitting. Wipe nose gently if needed. When sneezing, encourage your child to   Open the mouth and make a sound, to prevent  pressure buildup.    Avoid coming in contact with people who have colds, flu, or infections.      What can my child eat?    The day of surgery, give only cool, Clear liquids such as:    Apple Juice  Jell-o*  Kimo-aid*  Popsicles*  Flat pop (remove bubbles)  Water    If your child has an upset stomach, give small amounts often. Note: If   Your child vomits after drinking red liquids the vomit will be the same  color.    After the first day, when your child wants them add dairy and soft foods such as:  Ice cream  Milk shakes(use spoon)  Pudding  Smooth yogurt  Liquids are more important than food.    Be sure your child is drinking a lot.    When your child wants them, add soft foods (foods without rough  edges). See the chart for ideas. If a food is not on the list ask yourself:    Is it easy to chew? Is it free of coarse, rough, or crispy edges?  If the answer  is yes, your child can probably eat it.    Be sure to cut foods very small and encourage your child to chew them  well. Continue the soft diet for 2 weeks after surgery Avoid citrus fruits and juices   such as orange juice and lemonade, as they may sting your child s throat. Avoid  foods that are hot in temperature or spicy hot.      May Eat Should not eat   - Soft bread  - Soggy waffles or   Mohawk toast (no crusts).  Soaked in syrup  - Pancakes  - Scrambled or   poached egg   - Toast  - Crispy waffles  - Fried foods   - Oatmeal,or   Creamy cereal  - Soggy cold cereal  (soaked in milk   - Crunchy cold   cereal   - Soup  - Hot dogs  - Hamburgers  - Tender, moist  meat  - Pasta, noodles  - Spaghetti-Os  - Macaroni and  Cheese   - Tough, dry meat,  chicken or fish   - Milk  - Custard, pudding  - Ice cream  - Malts, shakes  - Yogurt (smooth)  - Cottage cheese   - Cookies  - Crackers  - Pretzels  - Chips  - Popcorn  - Nuts   - Sandwiches, (no crusts)  - Smooth peanut butter   and jelly  - Processed cheese  - Tuna - Grilled cheese  sandwiches   - Cooked  vegetables  - Mashed potatoes - Raw vegetables   - Tomatoes   - Applesauce  - Bananas  - Canned fruits  - Watermelon with out  seeds - Citrus fruits  - Moist fresh fruits   - Juices (not citrus)  - Kimo aid  - Flat pop (no bubbles)  - Jell-O - Citrus juices  - Pop with bubbles                         Same Day Surgery Discharge Instructions  Special Precautions After Surgery - Adult    It is not unusual to feel lightheaded or faint, up to 24 hours after surgery or while taking pain medication.  If you have these symptoms; sit for a few minutes before standing and have someone assist you when getting up.  You should rest and relax for the next 24 hours and must have someone stay with you for at least 24 hours after your discharge.  DO NOT DRIVE any vehicle or operate mechanical equipment for 24 hours following the end of your surgery.  DO NOT DRIVE while taking narcotic pain medications that have been prescribed by your physician.  If you had a limb operated on, you must be able to use it fully to drive.  DO NOT drink alcoholic beverages for 24 hours following surgery or while taking prescription pain medication.  Drink clear liquids (apple juice, ginger ale, broth, 7-Up, etc.).  Progress to your regular diet as you feel able.  Any questions call your physician and do not make important decisions for 24 hours.    A  __________________________________________________________________________________________________________________________________  IMPORTANT NUMBERS:    [facility] [place]  [phone], [phone]  Pharmacy:  [phone]  Same Day Surgery:  [phone], Monday - Friday until 8:30 p.m.  Surgery Specialty Clinic:  [phone]

## 2022-05-13 NOTE — ANESTHESIA POSTPROCEDURE EVALUATION
Patient: Mary Bueno    Procedure: Procedure(s):  TONSILLECTOMY  and ADENOIDECTOMY       Anesthesia Type:  General    Note:  Disposition: Outpatient   Postop Pain Control: Uneventful            Sign Out: Well controlled pain   PONV: No   Neuro/Psych: Uneventful            Sign Out: Acceptable/Baseline neuro status   Airway/Respiratory: Uneventful            Sign Out: Acceptable/Baseline resp. status   CV/Hemodynamics: Uneventful            Sign Out: Acceptable CV status; No obvious hypovolemia; No obvious fluid overload   Other NRE: NONE   DID A NON-ROUTINE EVENT OCCUR? No           Last vitals:  Vitals Value Taken Time   /81 05/13/22 1400   Temp 36.7  C (98  F) 05/13/22 1251   Pulse 93 05/13/22 1408   Resp 16 05/13/22 1408   SpO2 93 % 05/13/22 1411   Vitals shown include unvalidated device data.    Electronically Signed By: ERMELINDA Chinchilla CRNA  May 13, 2022  2:20 PM

## 2022-05-13 NOTE — OR NURSING
Pt resting in bed. Finished popsicle. Reports slight improvement in pain, but that it is still present. Pt encouraged to rest. Mom approached nurses station, stating that patient wishes to go home now. RN to assist patient in sitting position. Pt now nauseated. IV medication given.

## 2022-05-13 NOTE — ANESTHESIA CARE TRANSFER NOTE
Patient: Mary Bueno    Procedure: Procedure(s):  TONSILLECTOMY  and ADENOIDECTOMY       Diagnosis: Tonsillar hypertrophy [J35.1]  Sleep disorder breathing [G47.30]  History of strep pharyngitis [Z87.09]  History of migraine headaches [Z86.69]  BMI 40.0-44.9, adult (H) [Z68.41]  Diagnosis Additional Information: No value filed.    Anesthesia Type:   General     Note:    Oropharynx: oral airway in place and spontaneously breathing  Level of Consciousness: unresponsive  Oxygen Supplementation: nasal cannula  Level of Supplemental Oxygen (L/min / FiO2): 4  Independent Airway: airway patency satisfactory and stable  Dentition: dentition unchanged  Vital Signs Stable: post-procedure vital signs reviewed and stable  Report to RN Given: handoff report given  Patient transferred to: PACU    Handoff Report: Identifed the Patient, Identified the Reponsible Provider, Reviewed the pertinent medical history, Discussed the surgical course, Reviewed Intra-OP anesthesia mangement and issues during anesthesia, Set expectations for post-procedure period and Allowed opportunity for questions and acknowledgement of understanding      Vitals:  Vitals Value Taken Time   /83 05/13/22 1251   Temp 36.7  C (98  F) 05/13/22 1251   Pulse 81 05/13/22 1251   Resp 6 05/13/22 1251   SpO2 100 % 05/13/22 1251   Vitals shown include unvalidated device data.    Electronically Signed By: ERMELINDA Chinchilla CRNA  May 13, 2022  12:52 PM

## 2022-05-13 NOTE — INTERVAL H&P NOTE
"I have reviewed the surgical (or preoperative) H&P that is linked to this encounter, and examined the patient. There are no significant changes    Clinical Conditions Present on Arrival:  Clinically Significant Risk Factors Present on Admission                   # Severe Obesity: Estimated body mass index is 41.6 kg/m  as calculated from the following:    Height as of this encounter: 1.651 m (5' 5\").    Weight as of this encounter: 113.4 kg (250 lb).       "

## 2022-05-16 ENCOUNTER — TELEPHONE (OUTPATIENT)
Dept: NEUROLOGY | Facility: CLINIC | Age: 22
End: 2022-05-16
Payer: COMMERCIAL

## 2022-05-16 NOTE — TELEPHONE ENCOUNTER
PA Initiation    Medication: erenumab-aooe (AIMOVIG) 140 MG/ML injection   Insurance Company: [employer] - Phone [phone] Fax [phone]  Pharmacy Filling the Rx: [facility] - [place], [place] - [address] [place] [address]  Filling Pharmacy Phone: [phone]  Filling Pharmacy Fax: [phone]  Start Date: 5/16/2022

## 2022-05-16 NOTE — TELEPHONE ENCOUNTER
PA Initiation    Medication: ubrogepant (UBRELVY) 100 MG tablet   Insurance Company: [employer] - Phone [phone] Fax [phone]  Pharmacy Filling the Rx: [facility] - [place], [place] - [address] [place] [address]  Filling Pharmacy Phone: [phone]  Filling Pharmacy Fax: [phone]  Start Date: 5/16/2022

## 2022-05-16 NOTE — TELEPHONE ENCOUNTER
Prior Authorization Retail Medication Request    Medication/Dose: erenumab-aooe (AIMOVIG) 140 MG/ML injection     ICD code (if different than what is on RX): G43.711  Previously Tried and Failed:  See Chart  Rationale:  See Chart    Insurance Name: [id number]  Insurance ID:  [employer] [place]      Pharmacy Information (if different than what is on RX)  Name:    Phone:

## 2022-05-17 ENCOUNTER — MYC MEDICAL ADVICE (OUTPATIENT)
Dept: OTOLARYNGOLOGY | Facility: CLINIC | Age: 22
End: 2022-05-17

## 2022-05-17 DIAGNOSIS — J35.01 CHRONIC TONSILLITIS: ICD-10-CM

## 2022-05-17 DIAGNOSIS — J35.1 TONSILLAR HYPERTROPHY: ICD-10-CM

## 2022-05-17 DIAGNOSIS — Z90.89 STATUS POST TONSILLECTOMY: ICD-10-CM

## 2022-05-17 DIAGNOSIS — G89.18 ACUTE POST-OPERATIVE PAIN: ICD-10-CM

## 2022-05-17 LAB
PATH REPORT.COMMENTS IMP SPEC: NORMAL
PATH REPORT.COMMENTS IMP SPEC: NORMAL
PATH REPORT.FINAL DX SPEC: NORMAL
PATH REPORT.GROSS SPEC: NORMAL
PATH REPORT.MICROSCOPIC SPEC OTHER STN: NORMAL
PATH REPORT.RELEVANT HX SPEC: NORMAL
PHOTO IMAGE: NORMAL

## 2022-05-17 PROCEDURE — 88300 SURGICAL PATH GROSS: CPT | Mod: 26 | Performed by: PATHOLOGY

## 2022-05-17 NOTE — TELEPHONE ENCOUNTER
Prior Authorization Approval    Authorization Effective Date: 5/16/2022  Authorization Expiration Date: 5/16/2023  Medication: ubrogepant (UBRELVY) 100 MG tablet--APPROVED  Approved Dose/Quantity:   Reference #:     Insurance Company: [employer] - Phone [phone] Fax [phone]  Expected CoPay:       CoPay Card Available:      Foundation Assistance Needed:    Which Pharmacy is filling the prescription (Not needed for infusion/clinic administered): [facility] - [place], [place] - [address] [place] [address]  Pharmacy Notified: Yes  Patient Notified: Yes **Instructed pharmacy to notify patient when script is ready to /ship.**

## 2022-05-17 NOTE — TELEPHONE ENCOUNTER
Prior Authorization Approval    Authorization Effective Date: 5/16/2022  Authorization Expiration Date: 11/16/2022  Medication: erenumab-aooe (AIMOVIG) 140 MG/ML injection--APPROVED  Approved Dose/Quantity:   Reference #:     Insurance Company: [employer] - Phone [phone] Fax [phone]  Expected CoPay:       CoPay Card Available:      Foundation Assistance Needed:    Which Pharmacy is filling the prescription (Not needed for infusion/clinic administered): [facility] - [place], [place] - [address] [place] [address]  Pharmacy Notified: Yes  Patient Notified: Yes **Instructed pharmacy to notify patient when script is ready to /ship.**

## 2022-05-19 ENCOUNTER — MYC MEDICAL ADVICE (OUTPATIENT)
Dept: OTOLARYNGOLOGY | Facility: CLINIC | Age: 22
End: 2022-05-19
Payer: COMMERCIAL

## 2022-05-19 DIAGNOSIS — J35.01 CHRONIC TONSILLITIS: ICD-10-CM

## 2022-05-19 DIAGNOSIS — G89.18 ACUTE POST-OPERATIVE PAIN: ICD-10-CM

## 2022-05-19 DIAGNOSIS — J35.1 TONSILLAR HYPERTROPHY: Primary | ICD-10-CM

## 2022-05-19 DIAGNOSIS — Z90.89 STATUS POST TONSILLECTOMY: ICD-10-CM

## 2022-05-19 RX ORDER — HYDROMORPHONE HYDROCHLORIDE 1 MG/ML
2 SOLUTION ORAL EVERY 4 HOURS PRN
Qty: 60 ML | Refills: 0 | Status: SHIPPED | OUTPATIENT
Start: 2022-05-19 | End: 2022-05-19

## 2022-05-19 RX ORDER — HYDROMORPHONE HYDROCHLORIDE 1 MG/ML
2 SOLUTION ORAL EVERY 4 HOURS PRN
Qty: 60 ML | Refills: 0 | Status: SHIPPED | OUTPATIENT
Start: 2022-05-19 | End: 2023-02-09

## 2022-05-31 DIAGNOSIS — G43.009 MIGRAINE WITHOUT AURA AND WITHOUT STATUS MIGRAINOSUS, NOT INTRACTABLE: ICD-10-CM

## 2022-05-31 DIAGNOSIS — R51.9 CHRONIC DAILY HEADACHE: ICD-10-CM

## 2022-05-31 RX ORDER — PROMETHAZINE HYDROCHLORIDE 25 MG/1
25 TABLET ORAL EVERY 8 HOURS PRN
Qty: 60 TABLET | Refills: 1 | Status: SHIPPED | OUTPATIENT
Start: 2022-05-31 | End: 2024-04-04

## 2022-05-31 NOTE — TELEPHONE ENCOUNTER
Routing refill request to provider for review/approval because:  Drug not on the [facility] refill protocol   Thank you.  Flavio Witt RN

## 2022-06-15 NOTE — PROGRESS NOTES
Chief Complaint   Patient presents with     Post-op Visit     Post op Tonsillectomy 5-13-22     History of Present Illness  Mary Bueno is a 21 year old female who presents today for follow-up.  The patient went to the operating room and underwent bilateral tonsillectomy and adenoidectomy on 5/13/2022 with Dr. Cross.  Dr. Cross performed the patient's procedure because I was out for illness.  The patient had a normal postoperative course.  There was no postoperative evidence of bleeding.  The patient is swallowing well and has returned to a normal diet.  Patient is sleeping well.  The patient denies problems with significant sore throat.  She does report intermittently having some discomfort when she yawns or sticks out her tongue.  She is not really having any taste disturbance or tongue numbness.  She had a mild low-grade sore throat recently but this is not persistent or severe.    Past Medical History  Patient Active Problem List   Diagnosis     Recurrent UTI     Weight disorder     Health Care Home     Adjustment disorder with mixed anxiety and depressed mood     Obesity     Depression with anxiety     Current mild episode of major depressive disorder without prior episode (H)     Gastroesophageal reflux disease, esophagitis presence not specified     Increased insulin level     Nexplanon insertion     Migraine without aura and without status migrainosus, not intractable     Breakthrough bleeding on Nexplanon     Endometrial thickening on ultrasound     Current Medications    Current Outpatient Medications:      Cholecalciferol (VITAMIN D3) 50 MCG (2000 UT) CAPS, Take 1 capsule by mouth daily, Disp: 90 capsule, Rfl: 3     clindamycin-benzoyl peroxide (BENZACLIN) 1-5 % external gel, Apply topically 2 times daily, Disp: 35 g, Rfl: 3     clotrimazole (LOTRIMIN) 1 % external cream, Apply topically 2 times daily To perineum as needed, Disp: 15 g, Rfl: 1     erenumab-aooe (AIMOVIG) 140 MG/ML injection, Inject 1  mL (140 mg) Subcutaneous every 30 days, Disp: 1 mL, Rfl: 11     etonogestrel (IMPLANON/NEXPLANON) 68 MG IMPL, 1 each (68 mg) by Subdermal route continuous, Disp: , Rfl: 0     fluconazole (DIFLUCAN) 150 MG tablet, Take 1 tablet (150 mg) by mouth every 72 hours, Disp: 3 tablet, Rfl: 3     HYDROmorphone, STANDARD CONC, (DILAUDID) 1 MG/ML oral solution, Take 2 mLs (2 mg) by mouth every 4 hours as needed for severe pain, Disp: 60 mL, Rfl: 0     hydrOXYzine (ATARAX) 25 MG tablet, Take 1-2 tablets (25-50 mg) by mouth every 6 hours as needed for anxiety, Disp: 60 tablet, Rfl: 1     ondansetron (ZOFRAN) 8 MG tablet, Take 1 tablet (8 mg) by mouth every 8 hours as needed for nausea, Disp: 90 tablet, Rfl: 1     pantoprazole (PROTONIX) 40 MG EC tablet, Take 1 tablet (40 mg) by mouth daily, Disp: 90 tablet, Rfl: 3     promethazine (PHENERGAN) 25 MG tablet, Take 1 tablet (25 mg) by mouth every 8 hours as needed for nausea, Disp: 60 tablet, Rfl: 1     sertraline (ZOLOFT) 100 MG tablet, Take 1 tablet (100 mg) by mouth daily, Disp: 90 tablet, Rfl: 3     triamcinolone (KENALOG) 0.1 % external cream, Apply topically 2 times daily To thighs and flank s, Disp: 30 g, Rfl: 3     ubrogepant (UBRELVY) 100 MG tablet, Take 1 tablet (100 mg) by mouth at onset of headache (migraine), Disp: 8 tablet, Rfl: 11     valACYclovir (VALTREX) 1000 mg tablet, Take 1 tablet (1,000 mg) by mouth 2 times daily for 10 days, Disp: 20 tablet, Rfl: 0    Allergies  No Known Allergies    Social History  Social History     Socioeconomic History     Marital status: Single     Number of children: 0     Years of education: 6   Occupational History     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   Tobacco Use     Smoking status: Never Smoker     Smokeless tobacco: Never Used   Vaping Use     Vaping Use: Some days     Substances: Nicotine   Substance and Sexual Activity     Alcohol use: No     Alcohol/week: 0.0 standard drinks     Drug use: No     Sexual activity: Yes     Partners: Male     Birth  "control/protection: Implant       Family History  Family History   Problem Relation Age of Onset     Migraines Mother      Other - See Comments Father 31        MVA     Migraines Maternal Grandmother      Migraines Maternal Aunt      Coronary Artery Disease No family hx of      Other Cancer No family hx of        Review of Systems  As per HPI and PMHx, otherwise 10 system review including the head and neck, constitutional, eyes, respiratory, GI, skin, neurologic, lymphatic, endocrine, and allergy systems is negative.    Physical Exam  /75 (BP Location: Right arm, Patient Position: Sitting, Cuff Size: Adult Large)   Pulse 67   Temp 99  F (37.2  C) (Tympanic)   Ht 1.651 m (5' 5\")   LMP 05/10/2022   BMI 41.60 kg/m    GENERAL: Patient is a pleasant, cooperative 21 year old female in no acute distress.  HEAD: Normocephalic, atraumatic.  Hair and scalp are normal.  EYES: Pupils are equal, round, reactive to light and accommodation.  Extraocular movements are intact.  The sclera nonicteric without injection.  The extraocular structures are normal.  EARS: Normal shape and symmetry.  No tenderness when palpating the mastoid or tragal areas bilaterally.    NOSE: Nares are patent.  Nasal mucosa is pink and moist.  Negative anterior rhinoscopy.  ORAL CAVITY: Dentition is in age-appropriate repair.  Mucous membranes are moist.  Tongue is mobile, protrudes to the midline.  Palate elevates symmetrically.  Tonsils are surgically absent.  Tonsil fossa well-healed.  No erythema or exudate.  No oral cavity or oropharyngeal masses, lesions, ulcerations, leukoplakia.  NECK: Supple, trachea is midline.  There no palpable cervical lymphadenopathy or masses bilaterally.   NEUROLOGIC: Cranial nerves II through XII are grossly intact.  Voice is strong.  Patient is House-Brackman I/VI bilaterally.  CARDIOVASCULAR: Extremities are warm and well-perfused.  No significant peripheral edema.  RESPIRATORY: Patient has nonlabored " breathing without cough, wheeze, stridor.  PSYCHIATRIC: Patient is alert and oriented.  Mood and affect appear normal.  SKIN: Warm and dry.  No scalp, face, or neck lesions noted.    Assessment and Plan     ICD-10-CM    1. Tonsillar hypertrophy  J35.1    2. Chronic tonsillitis  J35.01    3. BMI 40.0-44.9, adult (H)  Z68.41    4. Status post tonsillectomy and adenoidectomy  Z90.89    5. Postoperative state  Z98.890       It was my pleasure seeing Mary Bueno today in clinic.  The patient has healed well after their tonsillectomy and adenoidectomy.  I would recommend observation at this time.  The patient can return to clinic as needed with problems or concerns.    Moises Mckeon MD  Department of Otolaryngology-Head and Neck Surgery  [facility]

## 2022-06-16 ENCOUNTER — OFFICE VISIT (OUTPATIENT)
Dept: OTOLARYNGOLOGY | Facility: CLINIC | Age: 22
End: 2022-06-16
Payer: COMMERCIAL

## 2022-06-16 VITALS
HEIGHT: 65 IN | TEMPERATURE: 99 F | DIASTOLIC BLOOD PRESSURE: 75 MMHG | BODY MASS INDEX: 41.6 KG/M2 | SYSTOLIC BLOOD PRESSURE: 110 MMHG | HEART RATE: 67 BPM

## 2022-06-16 DIAGNOSIS — J35.01 CHRONIC TONSILLITIS: ICD-10-CM

## 2022-06-16 DIAGNOSIS — J35.1 TONSILLAR HYPERTROPHY: Primary | ICD-10-CM

## 2022-06-16 DIAGNOSIS — Z90.89 STATUS POST TONSILLECTOMY AND ADENOIDECTOMY: ICD-10-CM

## 2022-06-16 DIAGNOSIS — Z98.890 POSTOPERATIVE STATE: ICD-10-CM

## 2022-06-16 PROCEDURE — 99024 POSTOP FOLLOW-UP VISIT: CPT | Performed by: OTOLARYNGOLOGY

## 2022-06-16 NOTE — PATIENT INSTRUCTIONS
Per physician instructions      If you have questions or concerns on any instructions given to you by your provider today or if you need to schedule an appointment, you can reach us at [phone].

## 2022-06-16 NOTE — LETTER
6/16/2022         RE: Mary Bueno  [address]  [facility] [address]        Dear Colleague,    Thank you for referring your patient, Mary Bueno, to the [facility]. Please see a copy of my visit note below.    Chief Complaint   Patient presents with     Post-op Visit     Post op Tonsillectomy 5-13-22     History of Present Illness  Mary Bueno is a 21 year old female who presents today for follow-up.  The patient went to the operating room and underwent bilateral tonsillectomy and adenoidectomy on 5/13/2022 with Dr. Cross.  Dr. Cross performed the patient's procedure because I was out for illness.  The patient had a normal postoperative course.  There was no postoperative evidence of bleeding.  The patient is swallowing well and has returned to a normal diet.  Patient is sleeping well.  The patient denies problems with significant sore throat.  She does report intermittently having some discomfort when she yawns or sticks out her tongue.  She is not really having any taste disturbance or tongue numbness.  She had a mild low-grade sore throat recently but this is not persistent or severe.    Past Medical History  Patient Active Problem List   Diagnosis     Recurrent UTI     Weight disorder     Health Care Home     Adjustment disorder with mixed anxiety and depressed mood     Obesity     Depression with anxiety     Current mild episode of major depressive disorder without prior episode (H)     Gastroesophageal reflux disease, esophagitis presence not specified     Increased insulin level     Nexplanon insertion     Migraine without aura and without status migrainosus, not intractable     Breakthrough bleeding on Nexplanon     Endometrial thickening on ultrasound     Current Medications    Current Outpatient Medications:      Cholecalciferol (VITAMIN D3) 50 MCG (2000 UT) CAPS, Take 1 capsule by mouth daily, Disp: 90 capsule, Rfl: 3     clindamycin-benzoyl peroxide  (BENZACLIN) 1-5 % external gel, Apply topically 2 times daily, Disp: 35 g, Rfl: 3     clotrimazole (LOTRIMIN) 1 % external cream, Apply topically 2 times daily To perineum as needed, Disp: 15 g, Rfl: 1     erenumab-aooe (AIMOVIG) 140 MG/ML injection, Inject 1 mL (140 mg) Subcutaneous every 30 days, Disp: 1 mL, Rfl: 11     etonogestrel (IMPLANON/NEXPLANON) 68 MG IMPL, 1 each (68 mg) by Subdermal route continuous, Disp: , Rfl: 0     fluconazole (DIFLUCAN) 150 MG tablet, Take 1 tablet (150 mg) by mouth every 72 hours, Disp: 3 tablet, Rfl: 3     HYDROmorphone, STANDARD CONC, (DILAUDID) 1 MG/ML oral solution, Take 2 mLs (2 mg) by mouth every 4 hours as needed for severe pain, Disp: 60 mL, Rfl: 0     hydrOXYzine (ATARAX) 25 MG tablet, Take 1-2 tablets (25-50 mg) by mouth every 6 hours as needed for anxiety, Disp: 60 tablet, Rfl: 1     ondansetron (ZOFRAN) 8 MG tablet, Take 1 tablet (8 mg) by mouth every 8 hours as needed for nausea, Disp: 90 tablet, Rfl: 1     pantoprazole (PROTONIX) 40 MG EC tablet, Take 1 tablet (40 mg) by mouth daily, Disp: 90 tablet, Rfl: 3     promethazine (PHENERGAN) 25 MG tablet, Take 1 tablet (25 mg) by mouth every 8 hours as needed for nausea, Disp: 60 tablet, Rfl: 1     sertraline (ZOLOFT) 100 MG tablet, Take 1 tablet (100 mg) by mouth daily, Disp: 90 tablet, Rfl: 3     triamcinolone (KENALOG) 0.1 % external cream, Apply topically 2 times daily To thighs and flank s, Disp: 30 g, Rfl: 3     ubrogepant (UBRELVY) 100 MG tablet, Take 1 tablet (100 mg) by mouth at onset of headache (migraine), Disp: 8 tablet, Rfl: 11     valACYclovir (VALTREX) 1000 mg tablet, Take 1 tablet (1,000 mg) by mouth 2 times daily for 10 days, Disp: 20 tablet, Rfl: 0    Allergies  No Known Allergies    Social History  Social History     Socioeconomic History     Marital status: Single     Number of children: 0     Years of education: 6   Occupational History     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   Tobacco Use     Smoking status: Never Smoker  "    Smokeless tobacco: Never Used   Vaping Use     Vaping Use: Some days     Substances: Nicotine   Substance and Sexual Activity     Alcohol use: No     Alcohol/week: 0.0 standard drinks     Drug use: No     Sexual activity: Yes     Partners: Male     Birth control/protection: Implant       Family History  Family History   Problem Relation Age of Onset     Migraines Mother      Other - See Comments Father 31        MVA     Migraines Maternal Grandmother      Migraines Maternal Aunt      Coronary Artery Disease No family hx of      Other Cancer No family hx of        Review of Systems  As per HPI and PMHx, otherwise 10 system review including the head and neck, constitutional, eyes, respiratory, GI, skin, neurologic, lymphatic, endocrine, and allergy systems is negative.    Physical Exam  /75 (BP Location: Right arm, Patient Position: Sitting, Cuff Size: Adult Large)   Pulse 67   Temp 99  F (37.2  C) (Tympanic)   Ht 1.651 m (5' 5\")   LMP 05/10/2022   BMI 41.60 kg/m    GENERAL: Patient is a pleasant, cooperative 21 year old female in no acute distress.  HEAD: Normocephalic, atraumatic.  Hair and scalp are normal.  EYES: Pupils are equal, round, reactive to light and accommodation.  Extraocular movements are intact.  The sclera nonicteric without injection.  The extraocular structures are normal.  EARS: Normal shape and symmetry.  No tenderness when palpating the mastoid or tragal areas bilaterally.    NOSE: Nares are patent.  Nasal mucosa is pink and moist.  Negative anterior rhinoscopy.  ORAL CAVITY: Dentition is in age-appropriate repair.  Mucous membranes are moist.  Tongue is mobile, protrudes to the midline.  Palate elevates symmetrically.  Tonsils are surgically absent.  Tonsil fossa well-healed.  No erythema or exudate.  No oral cavity or oropharyngeal masses, lesions, ulcerations, leukoplakia.  NECK: Supple, trachea is midline.  There no palpable cervical lymphadenopathy or masses bilaterally. "   NEUROLOGIC: Cranial nerves II through XII are grossly intact.  Voice is strong.  Patient is House-Brackman I/VI bilaterally.  CARDIOVASCULAR: Extremities are warm and well-perfused.  No significant peripheral edema.  RESPIRATORY: Patient has nonlabored breathing without cough, wheeze, stridor.  PSYCHIATRIC: Patient is alert and oriented.  Mood and affect appear normal.  SKIN: Warm and dry.  No scalp, face, or neck lesions noted.    Assessment and Plan     ICD-10-CM    1. Tonsillar hypertrophy  J35.1    2. Chronic tonsillitis  J35.01    3. BMI 40.0-44.9, adult (H)  Z68.41    4. Status post tonsillectomy and adenoidectomy  Z90.89    5. Postoperative state  Z98.890       It was my pleasure seeing Mary Bueno today in clinic.  The patient has healed well after their tonsillectomy and adenoidectomy.  I would recommend observation at this time.  The patient can return to clinic as needed with problems or concerns.    Moises Mckeon MD  Department of Otolaryngology-Head and Neck Surgery  [facility]         Again, thank you for allowing me to participate in the care of your patient.        Sincerely,        Moises Mckeon MD

## 2022-06-16 NOTE — NURSING NOTE
"Initial /75 (BP Location: Right arm, Patient Position: Sitting, Cuff Size: Adult Large)   Pulse 67   Temp 99  F (37.2  C) (Tympanic)   Ht 1.651 m (5' 5\")   LMP 05/10/2022   BMI 41.60 kg/m   Estimated body mass index is 41.6 kg/m  as calculated from the following:    Height as of this encounter: 1.651 m (5' 5\").    Weight as of 5/13/22: 113.4 kg (250 lb). .    Bernadette Benton MA    "

## 2022-07-10 DIAGNOSIS — Z86.19 H/O COLD SORES: ICD-10-CM

## 2022-07-13 NOTE — TELEPHONE ENCOUNTER
Routing refill request to provider for review/approval because:  PCP to determine refill    Leno Valencia RN

## 2022-07-14 RX ORDER — VALACYCLOVIR HYDROCHLORIDE 1 G/1
TABLET, FILM COATED ORAL
Qty: 20 TABLET | Refills: 0 | Status: SHIPPED | OUTPATIENT
Start: 2022-07-14 | End: 2022-08-04

## 2022-08-02 ENCOUNTER — MYC MEDICAL ADVICE (OUTPATIENT)
Dept: FAMILY MEDICINE | Facility: CLINIC | Age: 22
End: 2022-08-02

## 2022-08-02 ENCOUNTER — E-VISIT (OUTPATIENT)
Dept: FAMILY MEDICINE | Facility: CLINIC | Age: 22
End: 2022-08-02
Payer: COMMERCIAL

## 2022-08-02 DIAGNOSIS — Z86.19 H/O COLD SORES: ICD-10-CM

## 2022-08-02 PROCEDURE — 99421 OL DIG E/M SVC 5-10 MIN: CPT | Performed by: FAMILY MEDICINE

## 2022-08-04 RX ORDER — VALACYCLOVIR HYDROCHLORIDE 1 G/1
TABLET, FILM COATED ORAL
Qty: 20 TABLET | Refills: 0 | Status: SHIPPED | OUTPATIENT
Start: 2022-08-04 | End: 2022-09-12

## 2022-08-15 ENCOUNTER — MYC MEDICAL ADVICE (OUTPATIENT)
Dept: FAMILY MEDICINE | Facility: CLINIC | Age: 22
End: 2022-08-15

## 2022-08-15 DIAGNOSIS — B00.1 RECURRENT COLD SORES: Primary | ICD-10-CM

## 2022-08-15 RX ORDER — VALACYCLOVIR HYDROCHLORIDE 500 MG/1
500 TABLET, FILM COATED ORAL DAILY
Qty: 90 TABLET | Refills: 0 | Status: SHIPPED | OUTPATIENT
Start: 2022-08-15 | End: 2022-12-20

## 2022-09-12 ENCOUNTER — VIRTUAL VISIT (OUTPATIENT)
Dept: NEUROLOGY | Facility: CLINIC | Age: 22
End: 2022-09-12
Payer: COMMERCIAL

## 2022-09-12 DIAGNOSIS — G43.711 INTRACTABLE CHRONIC MIGRAINE WITHOUT AURA AND WITH STATUS MIGRAINOSUS: ICD-10-CM

## 2022-09-12 PROCEDURE — 99215 OFFICE O/P EST HI 40 MIN: CPT | Mod: 95 | Performed by: PSYCHIATRY & NEUROLOGY

## 2022-09-12 ASSESSMENT — MIGRAINE DISABILITY ASSESSMENT (MIDAS)
HOW OFTEN WERE SOCIAL ACTIVITIES MISSED DUE TO HEADACHES: 45
HOW MANY DAYS DID YOU NOT DO HOUSEWORK BECAUSE OF HEADACHES: 40
ON A SCALE FROM 0-10 ON AVERAGE HOW PAINFUL WERE HEADACHES: 7
HOW MANY DAYS DID YOU MISS WORK OR SCHOOL BECAUSE OF HEADACHES: 45
TOTAL SCORE: 235
HOW MANY DAYS WAS YOUR PRODUCTIVITY CUT IN HALF BECAUSE OF HEADACHES: 65
HOW MANY DAYS IN THE PAST 3 MONTHS HAVE YOU HAD A HEADACHE: 80
HOW MANY DAYS WAS HOUSEWORK PRODUCTIVITY CUT IN HALF DUE TO HEADACHES: 40

## 2022-09-12 ASSESSMENT — HEADACHE IMPACT TEST (HIT 6)
HOW OFTEN DID HEADACHS LIMIT CONCENTRATION ON WORK OR DAILY ACTIVITY: SOMETIMES
WHEN YOU HAVE A HEADACHE HOW OFTEN DO YOU WISH YOU COULD LIE DOWN: VERY OFTEN
HOW OFTEN HAVE YOU FELT FED UP OR IRRITATED BECAUSE OF YOUR HEADACHES: VERY OFTEN
HIT6 TOTAL SCORE: 63
HOW OFTEN HAVE YOU FELT TOO TIRED TO WORK BECAUSE OF YOUR HEADACHES: SOMETIMES
HOW OFTEN DO HEADACHES LIMIT YOUR DAILY ACTIVITIES: SOMETIMES
WHEN YOU HAVE HEADACHES HOW OFTEN IS THE PAIN SEVERE: VERY OFTEN

## 2022-09-12 NOTE — LETTER
9/12/2022       RE: Mary Bueno  [address]  [facility] [address]     Dear Colleague,    Thank you for referring your patient, Mary Bueno, to the [facility] [place] [facility]. Please see a copy of my visit note below.          [facility]    Mary Bueno MRN# [id number]   Age: 21 year old YOB: 2000     Requesting physician: Janna Puri*  Anabel Silver            Assessment and Plan:     (G43.711) Intractable chronic migraine without aura and with status migrainosus  Comment: The patient has intractable chronic migraine headache.  At this point time she would like to continue with Aimovig for another couple of months to see if she can notice it kick in.  She remembers Emgality took some time in the past.  She will contact me in December if she really has not noticed any benefit and we will switch over to Ajovy or Qulipta.  The patient will also be prescribed Ubrelvy to try as acute rescue medicine.  She is previously failed numerous triptans including sumatriptan, rizatriptan, zolmitriptan, frovatriptan.  She is also tried metoclopramide and Dilaudid without much benefit.    Plan:  1.  Acute therapy: add ubrogepant (UBRELVY) 100 MG tablet  2.  Preventive medicine: Continue Aimovig 140 mg once a month  3.  I recommend the patient discuss with primary care attention deficit disorder.  I do not diagnosis as an adult neurologist.  She does think she meets criteria and may benefit from medication I will defer to her PCP.          40 minutes spent caring for the patient on the day of the visit.    Janna Aragon MD             History of Present Illness:   CC: Reyna Feliciano is a 22 yo woman with intractable chronic migraine.  I first met the patient in October 2020.  At that time she was experiencing constant headaches that were causing her to miss substantial amounts of school.   She had failed numerous medications including propranolol, Depakote, amitriptyline, topiramate, and acetazolamide.  She was started on Emgality.  She reports that worked really well.  It took about 6 months to start to work and then she felt back to her normal self getting migraine headaches only 1 to 3 days in a month.  It worked until about November 2021 and then the headache symptoms slowly came back.  When I last saw the patient in May we switched her from Emgality to Aimovig.  She has now been taking this for 4 months and reports that she is so far experienced no untoward side effects other than injection site irritation but has also gained no benefit in headache management.     She had tonsillectomy and adenoidectomy this summer which might have made headaches worse.. She is doing well but reports a lot of pain in healing process. She was having strep infections frequently and now that is resolved. She feels a lot more phlegm (more drainage)     Sleeping-sleep is on and off.     She reports headaches are typically better in the morning and as the day progresses the headache gets worse.    She just started a new job. She still works as a nanny. She missed work completely with one severe migraine lasts 3-5 days. A couple times a month leaves early (2 times) A couple times each month she has social activities/household chores. MIDAS more accurately scores at 34 (not 235)    She reports problems with her memory. She reports she can't remember things she should be able to remember. She forgot her keys when she was leaving her mothers.  She also forgets dates (forgets to pick her brother) Anxiety and depression seems fine. She feels she hein sfine remembering her medicines. She has received no feedback at work about her memory. She has no trouble paying bills. Both her parents have Dad ADHD and her mom has ADD.         Physical Exam:     Limited due to video.  The patient is awake and alert.  She is oriented.  There  is no aphasia or dysarthria.  Face appears to be symmetric with normal movement on video    During our conversation she shows no evidence of any deficits in attention or recall.         Pertinent History/Data for appointment:             Sincerely,    Janna Aragon MD

## 2022-09-12 NOTE — PROGRESS NOTES
Jodie is a 21 year old who is being evaluated via a billable video visit.      How would you like to obtain your AVS? MyChart  If the video visit is dropped, the invitation should be resent by: Send to e-mail at: [email or web address]  Will anyone else be joining your video visit? No      Video-Visit Details    Video Start Time: 3:57 PM    Type of service:  Video Visit    Video End Time: 4:27PM    Originating Location (pt. Location): Home    Distant Location (provider location):  [facility] [place]     Platform used for Video Visit: [facility] [place] [facility]    Mary JUDY Bueno MRN# [id number]   Age: 21 year old YOB: 2000     Requesting physician: Janna Puri*  Anabel Silver            Assessment and Plan:     (G43.711) Intractable chronic migraine without aura and with status migrainosus  Comment: The patient has intractable chronic migraine headache.  At this point time she would like to continue with Aimovig for another couple of months to see if she can notice it kick in.  She remembers Emgality took some time in the past.  She will contact me in December if she really has not noticed any benefit and we will switch over to Ajovy or Qulipta.  The patient will also be prescribed Ubrelvy to try as acute rescue medicine.  She is previously failed numerous triptans including sumatriptan, rizatriptan, zolmitriptan, frovatriptan.  She is also tried metoclopramide and Dilaudid without much benefit.    Plan:  1.  Acute therapy: add ubrogepant (UBRELVY) 100 MG tablet  2.  Preventive medicine: Continue Aimovig 140 mg once a month  3.  I recommend the patient discuss with primary care attention deficit disorder.  I do not diagnosis as an adult neurologist.  She does think she meets criteria and may benefit from medication I will defer to her PCP.          40 minutes spent caring for the patient on the day of the visit.    Janna Aragon MD              History of Present Illness:   CC: Reyna Feliciano is a 22 yo woman with intractable chronic migraine.  I first met the patient in October 2020.  At that time she was experiencing constant headaches that were causing her to miss substantial amounts of school.  She had failed numerous medications including propranolol, Depakote, amitriptyline, topiramate, and acetazolamide.  She was started on Emgality.  She reports that worked really well.  It took about 6 months to start to work and then she felt back to her normal self getting migraine headaches only 1 to 3 days in a month.  It worked until about November 2021 and then the headache symptoms slowly came back.  When I last saw the patient in May we switched her from Emgality to Aimovig.  She has now been taking this for 4 months and reports that she is so far experienced no untoward side effects other than injection site irritation but has also gained no benefit in headache management.     She had tonsillectomy and adenoidectomy this summer which might have made headaches worse.. She is doing well but reports a lot of pain in healing process. She was having strep infections frequently and now that is resolved. She feels a lot more phlegm (more drainage)     Sleeping-sleep is on and off.     She reports headaches are typically better in the morning and as the day progresses the headache gets worse.    She just started a new job. She still works as a nanny. She missed work completely with one severe migraine lasts 3-5 days. A couple times a month leaves early (2 times) A couple times each month she has social activities/household chores. MIDAS more accurately scores at 34 (not 235)    She reports problems with her memory. She reports she can't remember things she should be able to remember. She forgot her keys when she was leaving her mothers.  She also forgets dates (forgets to pick her brother) Anxiety and depression seems fine. She feels she hein sfine remembering  her medicines. She has received no feedback at work about her memory. She has no trouble paying bills. Both her parents have Dad ADHD and her mom has ADD.         Physical Exam:     Limited due to video.  The patient is awake and alert.  She is oriented.  There is no aphasia or dysarthria.  Face appears to be symmetric with normal movement on video    During our conversation she shows no evidence of any deficits in attention or recall.         Pertinent History/Data for appointment:

## 2022-09-19 ENCOUNTER — MYC MEDICAL ADVICE (OUTPATIENT)
Dept: FAMILY MEDICINE | Facility: CLINIC | Age: 22
End: 2022-09-19

## 2022-09-19 DIAGNOSIS — L70.8 OTHER ACNE: ICD-10-CM

## 2022-09-20 RX ORDER — CLINDAMYCIN AND BENZOYL PEROXIDE 10; 50 MG/G; MG/G
GEL TOPICAL 2 TIMES DAILY
Qty: 35 G | Refills: 3 | Status: SHIPPED | OUTPATIENT
Start: 2022-09-20 | End: 2022-12-08

## 2022-10-31 ENCOUNTER — TELEPHONE (OUTPATIENT)
Dept: NEUROLOGY | Facility: CLINIC | Age: 22
End: 2022-10-31

## 2022-10-31 NOTE — TELEPHONE ENCOUNTER
Prior Authorization Renewal Medication Request     Medication/Dose: erenumab-aooe (AIMOVIG) 140 MG/ML injection      ICD code (if different than what is on RX): G43.711  Previously Tried and Failed:  See Chart  Rationale:  See Chart     Insurance Name: [id number]  Insurance ID:  [employer] [place]        Pharmacy Information (if different than what is on RX)  Name:    Phone:

## 2022-11-03 NOTE — TELEPHONE ENCOUNTER
Central Prior Authorization Team   Phone: [phone]      PA Initiation - completed/faxed in P/A form, unable to complete via CoverMyMeds    Medication: erenumab-aooe (AIMOVIG) 140 MG/ML injection  Insurance Company: [employer] - Phone [phone] Fax [phone]  Pharmacy Filling the Rx: [email or web address] - [place], [place] - [address] [place] [address]  Filling Pharmacy Phone: [phone]  Filling Pharmacy Fax:    Start Date: 11/3/2022

## 2022-11-04 NOTE — TELEPHONE ENCOUNTER
Rec'd a fax from insurance requesting additional information. I have completed and faxed back.    RE: Case# [id number]

## 2022-11-04 NOTE — TELEPHONE ENCOUNTER
PRIOR AUTHORIZATION DENIED    Medication: erenumab-aooe (AIMOVIG) 140 MG/ML injection    Denial Date: 11/4/2022    Denial Rational:               Appeal Information:

## 2022-11-20 ENCOUNTER — HEALTH MAINTENANCE LETTER (OUTPATIENT)
Age: 22
End: 2022-11-20

## 2022-11-21 ENCOUNTER — MYC MEDICAL ADVICE (OUTPATIENT)
Dept: FAMILY MEDICINE | Facility: CLINIC | Age: 22
End: 2022-11-21

## 2022-12-01 DIAGNOSIS — T88.7XXA SIDE EFFECT OF MEDICATION: ICD-10-CM

## 2022-12-01 DIAGNOSIS — B37.31 YEAST INFECTION OF THE VAGINA: ICD-10-CM

## 2022-12-04 RX ORDER — FLUCONAZOLE 150 MG/1
150 TABLET ORAL
Qty: 3 TABLET | Refills: 3 | OUTPATIENT
Start: 2022-12-04

## 2022-12-05 ENCOUNTER — TELEPHONE (OUTPATIENT)
Dept: FAMILY MEDICINE | Facility: CLINIC | Age: 22
End: 2022-12-05

## 2022-12-05 ENCOUNTER — E-VISIT (OUTPATIENT)
Dept: FAMILY MEDICINE | Facility: CLINIC | Age: 22
End: 2022-12-05
Payer: COMMERCIAL

## 2022-12-05 DIAGNOSIS — N76.0 BACTERIAL VAGINOSIS: ICD-10-CM

## 2022-12-05 DIAGNOSIS — N89.8 VAGINAL DISCHARGE: Primary | ICD-10-CM

## 2022-12-05 DIAGNOSIS — B96.89 BACTERIAL VAGINOSIS: ICD-10-CM

## 2022-12-05 PROCEDURE — 99421 OL DIG E/M SVC 5-10 MIN: CPT | Performed by: FAMILY MEDICINE

## 2022-12-05 NOTE — TELEPHONE ENCOUNTER
Patient calling stating she has treated herself for a yeast infection. She has history of yeast infections and UTI's. She had medication available for yeast infection symptoms. States infection is not gone yet. She is requesting a in person clinic appointment. She needs to be seen before 12/7/2022 as she is going out of town. She is unavailable when appointment is available. Recommended that she do an e visit through [facility]. She agrees with plan.    Tara Mason RN

## 2022-12-06 DIAGNOSIS — L70.8 OTHER ACNE: ICD-10-CM

## 2022-12-06 RX ORDER — METRONIDAZOLE 500 MG/1
500 TABLET ORAL 2 TIMES DAILY
Qty: 14 TABLET | Refills: 0 | Status: SHIPPED | OUTPATIENT
Start: 2022-12-06 | End: 2022-12-13

## 2022-12-06 NOTE — PATIENT INSTRUCTIONS
Thank you for choosing us for your care. I have placed an order for a prescription so that you can start treatment. View your full visit summary for details by clicking on the link below. Your pharmacist will able to address any questions you may have about the medication.     If you re not feeling better within 2-3 days, please schedule an appointment.  You can schedule an appointment right here in [facility], or call [phone]  If the visit is for the same symptoms as your eVisit, we ll refund the cost of your eVisit if seen within seven days.      Bacterial Vaginosis    You have a vaginal infection called bacterial vaginosis (BV). Both good and bad bacteria are present in a healthy vagina. BV occurs when these bacteria get out of balance. The number of bad bacteria increase. And the number of good bacteria decrease. BV is linked with sexual activity, but it's not a sexually transmitted infection (STI).   BV may or may not cause symptoms. If symptoms do occur, they can include:     Thin, gray, milky-white, or sometimes green discharge    Unpleasant odor or  fishy  smell    Itching, burning, or pain in or around the vagina  It is not known what causes BV, but certain factors can make the problem more likely. These can include:     Douching    Spermicides    Use of antibiotics    Change in hormone levels with pregnancy, breastfeeding, or menopause    Having sex with a new partner    Having sex with more than one partner  BV will sometimes go away on its own. But treatment is often advised. This is because untreated BV can raise the risk of more serious health problems such as:     Pelvic inflammatory disease (PID)     delivery (giving birth to a baby early if you re pregnant)    HIV and some other sexually transmitted infections (STIs)    Infection after surgery on the reproductive organs  Home care  General care    BV is most often treated with medicines called antibiotics. These may be given as pills or  as a vaginal cream. If antibiotics are prescribed, be sure to use them exactly as directed. And complete all of the medicine, even if your symptoms go away.    Don't douche or having sex during treatment.    If you have sex with a female partner, ask your healthcare provider if she should also be treated.  Prevention    Don't douche.    Don't have sex. If you do have sex, then take steps to lower your risk:  ? Use condoms when having sex.  ? Limit the number of sex partners you have.    Follow-up care  Follow up with your healthcare provider, or as advised.   When to get medical advice  Call your healthcare provider right away if:     You have a fever of 100.4 F (38 C) or higher, or as directed by your provider.    Your symptoms get worse, or they don t go away within a few days of starting treatment.    You have new pain in the lower belly or pelvic region.    You have side effects that bother you or a reaction to the pills or cream you re prescribed.    You or any of your sex partners have new symptoms, such as a rash, joint pain, or sores.  [employer] last reviewed this educational content on 6/1/2020 2000-2021 [employer]. All rights reserved. This information is not intended as a substitute for professional medical care. Always follow your healthcare professional's instructions.

## 2022-12-08 RX ORDER — CLINDAMYCIN AND BENZOYL PEROXIDE 10; 50 MG/G; MG/G
GEL TOPICAL 2 TIMES DAILY
Qty: 50 G | Refills: 3 | Status: SHIPPED | OUTPATIENT
Start: 2022-12-08 | End: 2023-02-09

## 2022-12-12 ENCOUNTER — TELEPHONE (OUTPATIENT)
Dept: NEUROLOGY | Facility: CLINIC | Age: 22
End: 2022-12-12

## 2022-12-16 DIAGNOSIS — B00.1 RECURRENT COLD SORES: ICD-10-CM

## 2022-12-20 RX ORDER — VALACYCLOVIR HYDROCHLORIDE 500 MG/1
500 TABLET, FILM COATED ORAL DAILY
Qty: 90 TABLET | Refills: 0 | Status: SHIPPED | OUTPATIENT
Start: 2022-12-20 | End: 2023-02-09

## 2022-12-20 NOTE — TELEPHONE ENCOUNTER
"Break in medication    Last Written Prescription Date: 8/15/22  Last Fill Quantity: 90, # refills: 0  Last office visit provider: 5/03/22        Requested Prescriptions   Pending Prescriptions Disp Refills     valACYclovir (VALTREX) 500 MG tablet [Pharmacy Med Name: valacyclovir 500 mg tablet] 90 tablet 0     Sig: Take 1 tablet (500 mg) by mouth daily       Antivirals for Herpes Protocol Passed - 12/16/2022  1:28 PM        Passed - Patient is age 12 or older        Passed - Recent (12 mo) or future (30 days) visit within the authorizing provider's specialty     Patient has had an office visit with the authorizing provider or a provider within the authorizing providers department within the previous 12 mos or has a future within next 30 days. See \"Patient Info\" tab in inbasket, or \"Choose Columns\" in Meds & Orders section of the refill encounter.              Passed - Medication is active on med list        Passed - Normal serum creatinine on file in past 12 months     Recent Labs   Lab Test 04/11/22  1547   CR 0.55       Ok to refill medication if creatinine is low                   Izzy Fagan, YA 12/20/22 8:22 AM  "

## 2022-12-27 ENCOUNTER — TELEPHONE (OUTPATIENT)
Dept: FAMILY MEDICINE | Facility: CLINIC | Age: 22
End: 2022-12-27

## 2022-12-27 NOTE — TELEPHONE ENCOUNTER
Reason for call:  Patient reporting a symptom    Symptom or request:  Antibiotic for reoccurring yeast infections not working    Duration (how long have symptoms been present): 2 weeks     Have you been treated for this before? Yes    Additional comments: No appts available and symptoms getting worse.  Pt wondering if she could get lab order?  Pt wondering if she can drop sample off at lab before she goes to work today at 2:00pm    Phone Number patient can be reached at:  Home number on file [phone] (home)    Best Time:  Before 2pm today    Can we leave a detailed message on this number:  YES    Call taken on 12/27/2022 at 9:44 AM by Kristi Angulo

## 2022-12-27 NOTE — TELEPHONE ENCOUNTER
She needs to go to urgent care . I am not sure what is going on. She will likely need some testing. Anabel Silver M.D.

## 2022-12-28 ENCOUNTER — OFFICE VISIT (OUTPATIENT)
Dept: FAMILY MEDICINE | Facility: CLINIC | Age: 22
End: 2022-12-28
Payer: COMMERCIAL

## 2022-12-28 ENCOUNTER — MYC MEDICAL ADVICE (OUTPATIENT)
Dept: FAMILY MEDICINE | Facility: CLINIC | Age: 22
End: 2022-12-28

## 2022-12-28 VITALS
BODY MASS INDEX: 40.62 KG/M2 | RESPIRATION RATE: 20 BRPM | HEART RATE: 74 BPM | TEMPERATURE: 98.7 F | OXYGEN SATURATION: 99 % | DIASTOLIC BLOOD PRESSURE: 83 MMHG | SYSTOLIC BLOOD PRESSURE: 126 MMHG | WEIGHT: 244.1 LBS

## 2022-12-28 DIAGNOSIS — L85.8 KERATOSIS PILARIS: ICD-10-CM

## 2022-12-28 DIAGNOSIS — R30.0 DYSURIA: ICD-10-CM

## 2022-12-28 DIAGNOSIS — N94.9 VAGINAL SYMPTOM: ICD-10-CM

## 2022-12-28 DIAGNOSIS — N89.8 VAGINAL DISCHARGE: Primary | ICD-10-CM

## 2022-12-28 LAB
ALBUMIN UR-MCNC: NEGATIVE MG/DL
APPEARANCE UR: CLEAR
BACTERIA #/AREA URNS HPF: ABNORMAL /HPF
BILIRUB UR QL STRIP: NEGATIVE
CLUE CELLS: ABNORMAL
COLOR UR AUTO: YELLOW
GLUCOSE UR STRIP-MCNC: NEGATIVE MG/DL
HGB UR QL STRIP: ABNORMAL
KETONES UR STRIP-MCNC: 15 MG/DL
LEUKOCYTE ESTERASE UR QL STRIP: NEGATIVE
NITRATE UR QL: NEGATIVE
PH UR STRIP: 5.5 [PH] (ref 5–8)
RBC #/AREA URNS AUTO: ABNORMAL /HPF
SP GR UR STRIP: >=1.03 (ref 1–1.03)
SQUAMOUS #/AREA URNS AUTO: ABNORMAL /LPF
TRICHOMONAS, WET PREP: ABNORMAL
UROBILINOGEN UR STRIP-ACNC: 0.2 E.U./DL
WBC #/AREA URNS AUTO: ABNORMAL /HPF
WBC'S/HIGH POWER FIELD, WET PREP: ABNORMAL
YEAST, WET PREP: ABNORMAL

## 2022-12-28 PROCEDURE — 81001 URINALYSIS AUTO W/SCOPE: CPT | Performed by: PHYSICIAN ASSISTANT

## 2022-12-28 PROCEDURE — 99214 OFFICE O/P EST MOD 30 MIN: CPT | Performed by: PHYSICIAN ASSISTANT

## 2022-12-28 PROCEDURE — 87210 SMEAR WET MOUNT SALINE/INK: CPT | Performed by: PHYSICIAN ASSISTANT

## 2022-12-28 RX ORDER — PREDNISONE 5 MG/1
TABLET ORAL
COMMUNITY
Start: 2022-05-13 | End: 2023-02-09

## 2022-12-28 RX ORDER — VALACYCLOVIR HYDROCHLORIDE 1 G/1
1000 TABLET, FILM COATED ORAL 2 TIMES DAILY
COMMUNITY
Start: 2022-12-16 | End: 2023-02-09

## 2022-12-28 RX ORDER — ONDANSETRON 8 MG/1
TABLET, ORALLY DISINTEGRATING ORAL
COMMUNITY
Start: 2022-05-13 | End: 2024-07-02

## 2022-12-28 NOTE — PROGRESS NOTES
URGENT CARE VISIT:    SUBJECTIVE:   Mary Bueno is a 22 year old female who presents with urinary urgency, frequency, and itching since 1 week(s) ago. Denies fever, abdominal pain, pelvic pain, nausea and vomiting. Symptoms have been stable.  Treatment tried include diflucan then metronidazole with no relief of symptoms. She is currently menstruating.     PMH:   Past Medical History:   Diagnosis Date     Migraines      Uncomplicated asthma      Allergies: Patient has no known allergies.   Medications:   Current Outpatient Medications   Medication Sig Dispense Refill     Cholecalciferol (VITAMIN D3) 50 MCG (2000 UT) CAPS Take 1 capsule by mouth daily 90 capsule 3     clindamycin-benzoyl peroxide (BENZACLIN) 1-5 % external gel Apply topically 2 times daily 50 g 3     erenumab-aooe (AIMOVIG) 140 MG/ML injection Inject 1 mL (140 mg) Subcutaneous every 30 days 1 mL 11     etonogestrel (IMPLANON/NEXPLANON) 68 MG IMPL 1 each (68 mg) by Subdermal route continuous  0     fluconazole (DIFLUCAN) 150 MG tablet Take 1 tablet (150 mg) by mouth every 72 hours 3 tablet 3     HYDROmorphone, STANDARD CONC, (DILAUDID) 1 MG/ML oral solution Take 2 mLs (2 mg) by mouth every 4 hours as needed for severe pain 60 mL 0     hydrOXYzine (ATARAX) 25 MG tablet Take 1-2 tablets (25-50 mg) by mouth every 6 hours as needed for anxiety 60 tablet 1     pantoprazole (PROTONIX) 40 MG EC tablet Take 1 tablet (40 mg) by mouth daily 90 tablet 3     predniSONE (DELTASONE) 5 MG tablet        promethazine (PHENERGAN) 25 MG tablet Take 1 tablet (25 mg) by mouth every 8 hours as needed for nausea 60 tablet 1     sertraline (ZOLOFT) 100 MG tablet Take 1 tablet (100 mg) by mouth daily 90 tablet 3     triamcinolone (KENALOG) 0.1 % external cream Apply topically 2 times daily To thighs and flank s 30 g 3     ubrogepant (UBRELVY) 100 MG tablet Take 1 tablet (100 mg) by mouth at onset of headache (migraine) Max one pill/24 hours 16 tablet 11      clotrimazole (LOTRIMIN) 1 % external cream Apply topically 2 times daily To perineum as needed (Patient not taking: Reported on 12/28/2022) 15 g 1     ondansetron (ZOFRAN ODT) 8 MG ODT tab  (Patient not taking: Reported on 12/28/2022)       ondansetron (ZOFRAN) 8 MG tablet Take 1 tablet (8 mg) by mouth every 8 hours as needed for nausea (Patient not taking: Reported on 12/28/2022) 90 tablet 1     valACYclovir (VALTREX) 1000 mg tablet Take 1,000 mg by mouth 2 times daily (Patient not taking: Reported on 12/28/2022)       valACYclovir (VALTREX) 500 MG tablet Take 1 tablet (500 mg) by mouth daily (Patient not taking: Reported on 12/28/2022) 90 tablet 0     Social History:   Social History     Tobacco Use     Smoking status: Never     Smokeless tobacco: Never   Substance Use Topics     Alcohol use: No     Alcohol/week: 0.0 standard drinks         ROS:   Review of systems negative except as stated above.    OBJECTIVE:  /83 (BP Location: Right arm, Patient Position: Sitting, Cuff Size: Adult Regular)   Pulse 74   Temp 98.7  F (37.1  C) (Oral)   Resp 20   Wt 110.7 kg (244 lb 1.6 oz)   SpO2 99%   BMI 40.62 kg/m    GENERAL APPEARANCE: healthy, alert and no distress  CV: regular rates and rhythm, normal S1 S2, no murmur noted  ABDOMEN:  soft, nontender, no HSM or masses and bowel sounds normal  BACK: No CVA tenderness  SKIN: no suspicious lesions or rashes  Genitourinary: deferred    Labs:  Results for orders placed or performed in visit on 12/28/22   UA macro with reflex to Microscopic and Culture - Clinc Collect     Status: Abnormal    Specimen: Urine, Clean Catch   Result Value Ref Range    Color Urine Yellow Colorless, Straw, Light Yellow, Yellow    Appearance Urine Clear Clear    Glucose Urine Negative Negative mg/dL    Bilirubin Urine Negative Negative    Ketones Urine 15 (A) Negative mg/dL    Specific Gravity Urine >=1.030 1.005 - 1.030    Blood Urine Moderate (A) Negative    pH Urine 5.5 5.0 - 8.0     Protein Albumin Urine Negative Negative mg/dL    Urobilinogen Urine 0.2 0.2, 1.0 E.U./dL    Nitrite Urine Negative Negative    Leukocyte Esterase Urine Negative Negative   Urine Microscopic     Status: Abnormal   Result Value Ref Range    Bacteria Urine Few (A) None Seen /HPF    RBC Urine 2-5 (A) 0-2 /HPF /HPF    WBC Urine 0-5 0-5 /HPF /HPF    Squamous Epithelials Urine Few (A) None Seen /LPF    Narrative    Urine Culture not indicated   Wet prep - Clinic Collect     Status: Abnormal    Specimen: Vagina; Swab   Result Value Ref Range    Trichomonas Absent Absent    Yeast Absent Absent    Clue Cells Absent Absent    WBCs/high power field 1+ (A) None       ASSESSMENT:    ICD-10-CM    1. Vaginal discharge  N89.8 Wet prep - Clinic Collect      2. Dysuria  R30.0 UA macro with reflex to Microscopic and Culture - Clinc Collect     Urine Microscopic          PLAN:  30 minutes spent on the date of the encounter doing chart review, review of outside records, review of test results, interpretation of tests, patient visit and documentation   Patient Instructions   Patient was treated with Diflucan and then Metronidazole empirically with no improvement of symptoms. Wet prep and UA are both negative. No clear etiology. Possibly vaginal external irritation or physiologic discharge causing irritation. Conservative measures discussed including avoid sexual intercourse until infection clears, avoid tight fitting clothes, and wear cotton underwear.  See your primary care provider if symptoms worsen or do not improve in 7 days. Seek emergency care if you develop severe pelvic pain.      Patient verbalized understanding and is agreeable to plan. The patient was discharged ambulatory and in stable condition.    Lynda Quigley PA-C ....................  12/28/2022   4:17 PM

## 2022-12-28 NOTE — PATIENT INSTRUCTIONS
Patient was treated with Diflucan and then Metronidazole empirically with no improvement of symptoms. Wet prep and UA are both negative. No clear etiology. Possibly vaginal external irritation or physiologic discharge causing irritation. Conservative measures discussed including avoid sexual intercourse until infection clears, avoid tight fitting clothes, and wear cotton underwear.  See your primary care provider if symptoms worsen or do not improve in 7 days. Seek emergency care if you develop severe pelvic pain.

## 2022-12-30 RX ORDER — MULTIVIT-MIN/IRON/FOLIC ACID/K 18-600-40
50 CAPSULE ORAL DAILY
COMMUNITY
Start: 2022-02-18 | End: 2023-06-13

## 2022-12-30 RX ORDER — TRIAMCINOLONE ACETONIDE 1 MG/G
CREAM TOPICAL 2 TIMES DAILY
Qty: 30 G | Refills: 3 | Status: SHIPPED | OUTPATIENT
Start: 2022-12-30 | End: 2023-02-09

## 2022-12-30 RX ORDER — CLOTRIMAZOLE 1 %
CREAM (GRAM) TOPICAL 2 TIMES DAILY
Qty: 15 G | Refills: 1 | Status: SHIPPED | OUTPATIENT
Start: 2022-12-30 | End: 2023-09-27

## 2023-02-09 ENCOUNTER — OFFICE VISIT (OUTPATIENT)
Dept: FAMILY MEDICINE | Facility: CLINIC | Age: 23
End: 2023-02-09
Payer: COMMERCIAL

## 2023-02-09 VITALS
OXYGEN SATURATION: 99 % | HEART RATE: 83 BPM | DIASTOLIC BLOOD PRESSURE: 72 MMHG | SYSTOLIC BLOOD PRESSURE: 120 MMHG | HEIGHT: 65 IN | BODY MASS INDEX: 40.32 KG/M2 | TEMPERATURE: 99.2 F | WEIGHT: 242 LBS

## 2023-02-09 DIAGNOSIS — L70.8 OTHER ACNE: ICD-10-CM

## 2023-02-09 DIAGNOSIS — Z13.1 SCREENING FOR DIABETES MELLITUS: ICD-10-CM

## 2023-02-09 DIAGNOSIS — G47.00 PERSISTENT INSOMNIA: ICD-10-CM

## 2023-02-09 DIAGNOSIS — Z00.00 ROUTINE GENERAL MEDICAL EXAMINATION AT A HEALTH CARE FACILITY: Primary | ICD-10-CM

## 2023-02-09 DIAGNOSIS — B00.1 RECURRENT COLD SORES: ICD-10-CM

## 2023-02-09 DIAGNOSIS — F43.23 ADJUSTMENT DISORDER WITH MIXED ANXIETY AND DEPRESSED MOOD: ICD-10-CM

## 2023-02-09 DIAGNOSIS — F32.0 CURRENT MILD EPISODE OF MAJOR DEPRESSIVE DISORDER WITHOUT PRIOR EPISODE (H): ICD-10-CM

## 2023-02-09 DIAGNOSIS — R50.9 FEVER, UNSPECIFIED FEVER CAUSE: ICD-10-CM

## 2023-02-09 DIAGNOSIS — L85.8 KERATOSIS PILARIS: ICD-10-CM

## 2023-02-09 DIAGNOSIS — E55.9 VITAMIN D DEFICIENCY: ICD-10-CM

## 2023-02-09 DIAGNOSIS — K21.9 GASTROESOPHAGEAL REFLUX DISEASE WITHOUT ESOPHAGITIS: ICD-10-CM

## 2023-02-09 DIAGNOSIS — R41.840 CONCENTRATION DEFICIT: ICD-10-CM

## 2023-02-09 LAB
ALBUMIN SERPL BCG-MCNC: 4.4 G/DL (ref 3.5–5.2)
ALP SERPL-CCNC: 105 U/L (ref 35–104)
ALT SERPL W P-5'-P-CCNC: 18 U/L (ref 10–35)
ANION GAP SERPL CALCULATED.3IONS-SCNC: 8 MMOL/L (ref 7–15)
AST SERPL W P-5'-P-CCNC: 23 U/L (ref 10–35)
BASOPHILS # BLD AUTO: 0 10E3/UL (ref 0–0.2)
BASOPHILS NFR BLD AUTO: 0 %
BILIRUB SERPL-MCNC: 0.2 MG/DL
BUN SERPL-MCNC: 10.3 MG/DL (ref 6–20)
CALCIUM SERPL-MCNC: 9.5 MG/DL (ref 8.6–10)
CHLORIDE SERPL-SCNC: 104 MMOL/L (ref 98–107)
CREAT SERPL-MCNC: 0.64 MG/DL (ref 0.51–0.95)
DEPRECATED HCO3 PLAS-SCNC: 26 MMOL/L (ref 22–29)
EOSINOPHIL # BLD AUTO: 0.1 10E3/UL (ref 0–0.7)
EOSINOPHIL NFR BLD AUTO: 1 %
ERYTHROCYTE [DISTWIDTH] IN BLOOD BY AUTOMATED COUNT: 14.8 % (ref 10–15)
GFR SERPL CREATININE-BSD FRML MDRD: >90 ML/MIN/1.73M2
GLUCOSE SERPL-MCNC: 91 MG/DL (ref 70–99)
HBA1C MFR BLD: 4.9 % (ref 0–5.6)
HCT VFR BLD AUTO: 40.5 % (ref 35–47)
HGB BLD-MCNC: 12 G/DL (ref 11.7–15.7)
LYMPHOCYTES # BLD AUTO: 2.1 10E3/UL (ref 0.8–5.3)
LYMPHOCYTES NFR BLD AUTO: 23 %
MCH RBC QN AUTO: 23 PG (ref 26.5–33)
MCHC RBC AUTO-ENTMCNC: 29.6 G/DL (ref 31.5–36.5)
MCV RBC AUTO: 78 FL (ref 78–100)
MONOCYTES # BLD AUTO: 0.8 10E3/UL (ref 0–1.3)
MONOCYTES NFR BLD AUTO: 8 %
NEUTROPHILS # BLD AUTO: 6.5 10E3/UL (ref 1.6–8.3)
NEUTROPHILS NFR BLD AUTO: 68 %
PLATELET # BLD AUTO: 215 10E3/UL (ref 150–450)
POTASSIUM SERPL-SCNC: 4.1 MMOL/L (ref 3.4–5.3)
PROT SERPL-MCNC: 8.4 G/DL (ref 6.4–8.3)
RBC # BLD AUTO: 5.21 10E6/UL (ref 3.8–5.2)
SODIUM SERPL-SCNC: 138 MMOL/L (ref 136–145)
TSH SERPL DL<=0.005 MIU/L-ACNC: 1.47 UIU/ML (ref 0.3–4.2)
WBC # BLD AUTO: 9.5 10E3/UL (ref 4–11)

## 2023-02-09 PROCEDURE — 36415 COLL VENOUS BLD VENIPUNCTURE: CPT | Performed by: FAMILY MEDICINE

## 2023-02-09 PROCEDURE — 80050 GENERAL HEALTH PANEL: CPT | Performed by: FAMILY MEDICINE

## 2023-02-09 PROCEDURE — 83036 HEMOGLOBIN GLYCOSYLATED A1C: CPT | Performed by: FAMILY MEDICINE

## 2023-02-09 PROCEDURE — 99395 PREV VISIT EST AGE 18-39: CPT | Performed by: FAMILY MEDICINE

## 2023-02-09 PROCEDURE — 99214 OFFICE O/P EST MOD 30 MIN: CPT | Mod: 25 | Performed by: FAMILY MEDICINE

## 2023-02-09 PROCEDURE — 82306 VITAMIN D 25 HYDROXY: CPT | Performed by: FAMILY MEDICINE

## 2023-02-09 RX ORDER — SEMAGLUTIDE 0.25 MG/.5ML
0.25 INJECTION, SOLUTION SUBCUTANEOUS
Qty: 2 ML | Refills: 0 | Status: SHIPPED | OUTPATIENT
Start: 2023-02-09 | End: 2023-04-11

## 2023-02-09 RX ORDER — SEMAGLUTIDE 0.25 MG/.5ML
0.5 INJECTION, SOLUTION SUBCUTANEOUS
Qty: 2 ML | Refills: 0 | Status: SHIPPED | OUTPATIENT
Start: 2023-02-09 | End: 2023-04-11

## 2023-02-09 RX ORDER — VALACYCLOVIR HYDROCHLORIDE 500 MG/1
500 TABLET, FILM COATED ORAL DAILY
Qty: 90 TABLET | Refills: 3 | Status: SHIPPED | OUTPATIENT
Start: 2023-02-09 | End: 2024-01-23

## 2023-02-09 RX ORDER — CLINDAMYCIN AND BENZOYL PEROXIDE 10; 50 MG/G; MG/G
GEL TOPICAL DAILY
Qty: 100 G | Refills: 3 | Status: SHIPPED | OUTPATIENT
Start: 2023-02-09 | End: 2023-07-13

## 2023-02-09 RX ORDER — VALACYCLOVIR HYDROCHLORIDE 1 G/1
1000 TABLET, FILM COATED ORAL 2 TIMES DAILY
Qty: 8 TABLET | Refills: 3 | Status: SHIPPED | OUTPATIENT
Start: 2023-02-09 | End: 2024-03-05

## 2023-02-09 RX ORDER — TRAZODONE HYDROCHLORIDE 50 MG/1
TABLET, FILM COATED ORAL
Qty: 90 TABLET | Refills: 3 | Status: SHIPPED | OUTPATIENT
Start: 2023-02-09 | End: 2023-06-14

## 2023-02-09 RX ORDER — ACETAMINOPHEN 160 MG
1 TABLET,DISINTEGRATING ORAL DAILY
Qty: 90 CAPSULE | Refills: 3 | Status: SHIPPED | OUTPATIENT
Start: 2023-02-09 | End: 2024-04-04

## 2023-02-09 RX ORDER — DULOXETIN HYDROCHLORIDE 20 MG/1
CAPSULE, DELAYED RELEASE ORAL
Qty: 60 CAPSULE | Refills: 3 | Status: SHIPPED | OUTPATIENT
Start: 2023-02-09 | End: 2023-07-27

## 2023-02-09 RX ORDER — PANTOPRAZOLE SODIUM 40 MG/1
40 TABLET, DELAYED RELEASE ORAL DAILY
Qty: 90 TABLET | Refills: 3 | Status: SHIPPED | OUTPATIENT
Start: 2023-02-09 | End: 2024-04-04

## 2023-02-09 ASSESSMENT — ENCOUNTER SYMPTOMS
FREQUENCY: 0
DIZZINESS: 0
CHILLS: 0
HEMATURIA: 0
SORE THROAT: 0
PARESTHESIAS: 0
WEAKNESS: 0
NERVOUS/ANXIOUS: 1
EYE PAIN: 0
ABDOMINAL PAIN: 0
HEARTBURN: 1
PALPITATIONS: 0
DYSURIA: 0
CONSTIPATION: 0
JOINT SWELLING: 0
FEVER: 0
HEMATOCHEZIA: 0
SHORTNESS OF BREATH: 0
ARTHRALGIAS: 0
MYALGIAS: 0
DIARRHEA: 0
HEADACHES: 1
NAUSEA: 1
COUGH: 0

## 2023-02-09 ASSESSMENT — PATIENT HEALTH QUESTIONNAIRE - PHQ9
10. IF YOU CHECKED OFF ANY PROBLEMS, HOW DIFFICULT HAVE THESE PROBLEMS MADE IT FOR YOU TO DO YOUR WORK, TAKE CARE OF THINGS AT HOME, OR GET ALONG WITH OTHER PEOPLE: SOMEWHAT DIFFICULT
SUM OF ALL RESPONSES TO PHQ QUESTIONS 1-9: 9
SUM OF ALL RESPONSES TO PHQ QUESTIONS 1-9: 9

## 2023-02-09 NOTE — PROGRESS NOTES
SUBJECTIVE:   CC: Jodie is an 22 year old who presents for preventive health visit.     Patient has been advised of split billing requirements and indicates understanding: Yes  Healthy Habits:     Getting at least 3 servings of Calcium per day:  NO    Bi-annual eye exam:  NO    Dental care twice a year:  Yes    Sleep apnea or symptoms of sleep apnea:  None    Diet:  Regular (no restrictions)    Frequency of exercise:  2-3 days/week    Duration of exercise:  15-30 minutes    Taking medications regularly:  Yes    Medication side effects:  None    PHQ-2 Total Score: 1    Additional concerns today:  No    *Discuss large Qt of the Benzaclin she uses it under her arms and legs and buttocks to decrease the keratosis   *Discuss protonix dose. If she misses a dose lots of reflux       Depression and Anxiety Follow-Up    How are you doing with your depression since your last visit? Worsened     How are you doing with your anxiety since your last visit?      Are you having other symptoms that might be associated with depression or anxiety? No    Have you had a significant life event? No     Do you have any concerns with your use of alcohol or other drugs? No    Social History     Tobacco Use     Smoking status: Never     Smokeless tobacco: Never   Vaping Use     Vaping Use: Some days     Substances: Nicotine   Substance Use Topics     Alcohol use: No     Alcohol/week: 0.0 standard drinks     Drug use: No     PHQ 11/30/2021 5/3/2022 2/9/2023   PHQ-9 Total Score 2 0 9   Q9: Thoughts of better off dead/self-harm past 2 weeks Not at all Not at all Not at all     JM-7 SCORE 9/3/2020 1/18/2021 11/30/2021   Total Score 7 0 9     Last PHQ-9 2/9/2023   1.  Little interest or pleasure in doing things 1   2.  Feeling down, depressed, or hopeless 1   3.  Trouble falling or staying asleep, or sleeping too much 2   4.  Feeling tired or having little energy 1   5.  Poor appetite or overeating 1   6.  Feeling bad about yourself 1   7.   Trouble concentrating 1   8.  Moving slowly or restless 1   Q9: Thoughts of better off dead/self-harm past 2 weeks 0   PHQ-9 Total Score 9   Difficulty at work, home, or with people -             Answers for HPI/ROS submitted by the patient on 2/9/2023  If you checked off any problems, how difficult have these problems made it for you to do your work, take care of things at home, or get along with other people?: Somewhat difficult  PHQ9 TOTAL SCORE: 9    Today's PHQ-2 Score:   PHQ-2 ( 1999 [employer]) 2/9/2023   Q1: Little interest or pleasure in doing things 0   Q2: Feeling down, depressed or hopeless 1   PHQ-2 Score 1   PHQ-2 Total Score (12-17 Years)- Positive if 3 or more points; Administer PHQ-A if positive -   Q1: Little interest or pleasure in doing things Not at all   Q2: Feeling down, depressed or hopeless Several days   PHQ-2 Score 1       Have you ever done Advance Care Planning? (For example, a Health Directive, POLST, or a discussion with a medical provider or your loved ones about your wishes):     Social History     Tobacco Use     Smoking status: Never     Smokeless tobacco: Never   Substance Use Topics     Alcohol use: No     Alcohol/week: 0.0 standard drinks     If you drink alcohol do you typically have >3 drinks per day or >7 drinks per week? No    Alcohol Use 2/9/2023   Prescreen: >3 drinks/day or >7 drinks/week? Not Applicable   Prescreen: >3 drinks/day or >7 drinks/week? -   No flowsheet data found.    Reviewed orders with patient.  Reviewed health maintenance and updated orders accordingly - Yes  Lab work is in process    Breast Cancer Screening:        History of abnormal Pap smear: NO - under age 21, PAP not appropriate for age     Reviewed and updated as needed this visit by clinical staff   Tobacco  Allergies  Meds  Problems  Med Hx  Surg Hx  Fam Hx          Reviewed and updated as needed this visit by Provider                     Review of Systems   Constitutional: Negative for chills  "and fever.   HENT: Positive for congestion. Negative for ear pain, hearing loss and sore throat.    Eyes: Negative for pain and visual disturbance.   Respiratory: Negative for cough and shortness of breath.    Cardiovascular: Negative for chest pain, palpitations and peripheral edema.   Gastrointestinal: Positive for heartburn and nausea. Negative for abdominal pain, constipation, diarrhea and hematochezia.   Genitourinary: Negative for dysuria, frequency, genital sores, hematuria and urgency.   Musculoskeletal: Negative for arthralgias, joint swelling and myalgias.   Skin: Negative for rash.   Neurological: Positive for headaches. Negative for dizziness, weakness and paresthesias.   Psychiatric/Behavioral: Positive for mood changes. The patient is nervous/anxious.      Gets irritated easily /not sleeping through  The night she is trying to get out of a relationship      OBJECTIVE:   /72 (BP Location: Right arm, Patient Position: Sitting, Cuff Size: Adult Large)   Pulse 83   Temp 99.2  F (37.3  C) (Tympanic)   Ht 1.651 m (5' 5\")   Wt 109.8 kg (242 lb)   SpO2 99%   BMI 40.27 kg/m    Physical Exam  GENERAL: healthy, alert and no distress  NECK: no adenopathy, no asymmetry, masses, or scars and thyroid normal to palpation  RESP: lungs clear to auscultation - no rales, rhonchi or wheezes  CV: regular rate and rhythm, normal S1 S2, no S3 or S4, no murmur, click or rub, no peripheral edema and peripheral pulses strong    Diagnostic Test Results:  Labs reviewed in Epic  Results for orders placed or performed in visit on 02/09/23   Vitamin D Deficiency     Status: Normal   Result Value Ref Range    Vitamin D, Total (25-Hydroxy) 31 20 - 75 ug/L    Narrative    Season, race, dietary intake, and treatment affect the concentration of 25-hydroxy-Vitamin D. Values may decrease during winter months and increase during summer months. Values 20-29 ug/L may indicate Vitamin D insufficiency and values <20 ug/L may indicate " Vitamin D deficiency.    Vitamin D determination is routinely performed by an immunoassay specific for 25 hydroxyvitamin D3.  If an individual is on vitamin D2(ergocalciferol) supplementation, please specify 25 OH vitamin D2 and D3 level determination by LCMSMS test VITD23.     TSH with free T4 reflex     Status: Normal   Result Value Ref Range    TSH 1.47 0.30 - 4.20 uIU/mL   Hemoglobin A1c     Status: Normal   Result Value Ref Range    Hemoglobin A1C 4.9 0.0 - 5.6 %   Comprehensive metabolic panel (BMP + Alb, Alk Phos, ALT, AST, Total. Bili, TP)     Status: Abnormal   Result Value Ref Range    Sodium 138 136 - 145 mmol/L    Potassium 4.1 3.4 - 5.3 mmol/L    Chloride 104 98 - 107 mmol/L    Carbon Dioxide (CO2) 26 22 - 29 mmol/L    Anion Gap 8 7 - 15 mmol/L    Urea Nitrogen 10.3 6.0 - 20.0 mg/dL    Creatinine 0.64 0.51 - 0.95 mg/dL    Calcium 9.5 8.6 - 10.0 mg/dL    Glucose 91 70 - 99 mg/dL    Alkaline Phosphatase 105 (H) 35 - 104 U/L    AST 23 10 - 35 U/L    ALT 18 10 - 35 U/L    Protein Total 8.4 (H) 6.4 - 8.3 g/dL    Albumin 4.4 3.5 - 5.2 g/dL    Bilirubin Total 0.2 <=1.2 mg/dL    GFR Estimate >90 >60 mL/min/1.73m2   CBC with platelets and differential     Status: Abnormal   Result Value Ref Range    WBC Count 9.5 4.0 - 11.0 10e3/uL    RBC Count 5.21 (H) 3.80 - 5.20 10e6/uL    Hemoglobin 12.0 11.7 - 15.7 g/dL    Hematocrit 40.5 35.0 - 47.0 %    MCV 78 78 - 100 fL    MCH 23.0 (L) 26.5 - 33.0 pg    MCHC 29.6 (L) 31.5 - 36.5 g/dL    RDW 14.8 10.0 - 15.0 %    Platelet Count 215 150 - 450 10e3/uL    % Neutrophils 68 %    % Lymphocytes 23 %    % Monocytes 8 %    % Eosinophils 1 %    % Basophils 0 %    Absolute Neutrophils 6.5 1.6 - 8.3 10e3/uL    Absolute Lymphocytes 2.1 0.8 - 5.3 10e3/uL    Absolute Monocytes 0.8 0.0 - 1.3 10e3/uL    Absolute Eosinophils 0.1 0.0 - 0.7 10e3/uL    Absolute Basophils 0.0 0.0 - 0.2 10e3/uL   CBC with platelets and differential     Status: Abnormal    Narrative    The following orders were  created for panel order CBC with platelets and differential.  Procedure                               Abnormality         Status                     ---------                               -----------         ------                     CBC with platelets and d...[[id number]]  Abnormal            Final result                 Please view results for these tests on the individual orders.       ASSESSMENT/PLAN:   (Z00.00) Routine general medical examination at a health care facility  (primary encounter diagnosis)  Comment:   Plan:     (E55.9) Vitamin D deficiency  Comment:   Plan: Cholecalciferol (VITAMIN D3) 50 MCG (2000 UT)         CAPS, Vitamin D Deficiency        Recheck     (K21.9) Gastroesophageal reflux disease without esophagitis  Comment:   Plan: pantoprazole (PROTONIX) 40 MG EC tablet        Discussed missing doses and taking occassionally the pepcid     (B00.1) Recurrent cold sores  Comment:   Plan: valACYclovir (VALTREX) 500 MG tablet,         valACYclovir (VALTREX) 1000 mg tablet,         Comprehensive metabolic panel (BMP + Alb, Alk         Phos, ALT, AST, Total. Bili, TP)            (Z68.41) BMI 40.0-44.9, adult (H)  Comment:   Plan: Semaglutide-Weight Management (WEGOVY) 0.25         MG/0.5ML SOAJ, Semaglutide-Weight Management         (WEGOVY) 0.25 MG/0.5ML SOAJ, TSH with free T4         reflex, Hemoglobin A1c, Comprehensive metabolic        panel (BMP + Alb, Alk Phos, ALT, AST, Total.         Bili, TP)        We did talk aobut her trying this for weight loss. She does not want to see weight management     (F32.0) Current mild episode of major depressive disorder without prior episode (H)  Comment:   Plan: cont current medications     (L70.8) Other acne  Comment:   Plan: clindamycin-benzoyl peroxide (BENZACLIN) 1-5 %         external gel        Uses for folliculitis and keratosis pilaris     (L85.8) Keratosis pilaris  Comment:   Plan:     (G47.00) Persistent insomnia  Comment:   Plan: traZODone (DESYREL)  "50 MG tablet        Will have her try this     (F43.23) Adjustment disorder with mixed anxiety and depressed mood  Comment:   Plan: DULoxetine (CYMBALTA) 20 MG capsule, TSH with         free T4 reflex        Will try duloxetine taper down on zoloft     (R41.840) Concentration deficit  Comment:   Plan: Adult Mental Health  Referral, TSH         with free T4 reflex        Refer fir testing     (Z13.1) Screening for diabetes mellitus  Comment:   Plan: Hemoglobin A1c, Comprehensive metabolic panel         (BMP + Alb, Alk Phos, ALT, AST, Total. Bili,         TP)            (R50.9) Fever, unspecified fever cause  Comment:   Plan:           COUNSELING:  Reviewed preventive health counseling, as reflected in patient instructions      BMI:   Estimated body mass index is 40.27 kg/m  as calculated from the following:    Height as of this encounter: 1.651 m (5' 5\").    Weight as of this encounter: 109.8 kg (242 lb).   Will start on wegovy       She reports that she has never smoked. She has never used smokeless tobacco.          Anabel Silver MD  [facility]  "

## 2023-02-09 NOTE — PATIENT INSTRUCTIONS
Preventive Health Recommendations  Female Ages 18 to 20     Yearly exam:   See your health care provider every year in order to  Review health changes.   Discuss preventive care.    Review your medicines if your doctor has prescribed any.    You should be tested each year for STDs (sexually transmitted diseases).     After age 20, talk to your provider about how often you should have cholesterol testing.    If you are at risk for diabetes, you should have a diabetes test (fasting glucose).     Shots:   Get a flu shot each year.   Get a tetanus shot every 10 years.   Consider getting the shot (vaccine) that prevents cervical cancer (Gardasil).    Nutrition:   Eat at least 5 servings of fruits and vegetables each day.  Eat whole-grain bread, whole-wheat pasta and brown rice instead of white grains and rice.  Get adequate Calcium and Vitamin D.     Lifestyle  Exercise at least 150 minutes a week each week (30 minutes a day, 5 days a week). This will help you control your weight and prevent disease.  No smoking.   Wear sunscreen to prevent skin cancer.  See your dentist every six months for an exam and cleaning.      Start the one cap of the duloxetine for 2 weeks at the same time decrease the zoloft to 1/2 tab (50) mg for 2 weeks then stop the sertraline when you increase to 2 caps of the duloxetine

## 2023-02-11 LAB — DEPRECATED CALCIDIOL+CALCIFEROL SERPL-MC: 31 UG/L (ref 20–75)

## 2023-03-01 ENCOUNTER — HOSPITAL ENCOUNTER (EMERGENCY)
Facility: HOSPITAL | Age: 23
Discharge: HOME | End: 2023-03-01
Payer: COMMERCIAL

## 2023-03-01 VITALS
BODY MASS INDEX: 39.99 KG/M2 | DIASTOLIC BLOOD PRESSURE: 62 MMHG | SYSTOLIC BLOOD PRESSURE: 131 MMHG | HEART RATE: 90 BPM | RESPIRATION RATE: 12 BRPM | HEIGHT: 65 IN | OXYGEN SATURATION: 99 % | WEIGHT: 240 LBS | TEMPERATURE: 98.1 F

## 2023-03-01 DIAGNOSIS — R51.9 CHRONIC INTRACTABLE HEADACHE, UNSPECIFIED HEADACHE TYPE: ICD-10-CM

## 2023-03-01 DIAGNOSIS — G89.29 CHRONIC INTRACTABLE HEADACHE, UNSPECIFIED HEADACHE TYPE: ICD-10-CM

## 2023-03-01 RX ORDER — DIPHENHYDRAMINE HYDROCHLORIDE 50 MG/ML
25 INJECTION INTRAMUSCULAR; INTRAVENOUS ONCE
Status: DISCONTINUED | OUTPATIENT
Start: 2023-03-01 | End: 2023-03-01 | Stop reason: HOSPADM

## 2023-03-01 RX ORDER — METOCLOPRAMIDE HYDROCHLORIDE 5 MG/ML
10 INJECTION INTRAMUSCULAR; INTRAVENOUS ONCE
Status: DISCONTINUED | OUTPATIENT
Start: 2023-03-01 | End: 2023-03-01 | Stop reason: HOSPADM

## 2023-03-01 RX ORDER — KETOROLAC TROMETHAMINE 15 MG/ML
15 INJECTION, SOLUTION INTRAMUSCULAR; INTRAVENOUS ONCE
Status: DISCONTINUED | OUTPATIENT
Start: 2023-03-01 | End: 2023-03-01 | Stop reason: HOSPADM

## 2023-03-01 ASSESSMENT — ENCOUNTER SYMPTOMS
COUGH: 0
FEVER: 0
CHILLS: 0
HEADACHES: 1
NAUSEA: 1
APPETITE CHANGE: 0

## 2023-03-01 NOTE — ED TRIAGE NOTES
The pt has had a migraine for a month. The pt states she is nauseated , light sensitive, and the medication she is taking at home is not working for her migraines.

## 2023-03-01 NOTE — ED PROVIDER NOTES
EMERGENCY DEPARTMENT ENCOUNTER      NAME: Mary Bueno  AGE: 22 year old female  YOB: 2000  MRN: [id number]  EVALUATION DATE & TIME: No admission date for patient encounter.    PCP: Anabel Silver    ED PROVIDER: Ingris Garcia PA-C      Chief Complaint   Patient presents with     Migraine         FINAL IMPRESSION:  1. Chronic intractable headache, unspecified headache type          MEDICAL DECISION MAKING:    Pertinent Labs & Imaging studies reviewed. (See chart for details)  22 year old female with a history of migraines presents to the Emergency Department for evaluation of migraine headache x 1 month. Similar to her typical migraines. Follows with neurology at [place]. She gets Aimovig 140 mg once a month and was started on Ubrelvy 100 mg tablet which she states she has not noticed improvement in her migraines with. She uses dilaudid which is also not helping. She denies any new features with this migraine. No fevers, vomiting, vision changes, extremity weakness or numbness.     Vitals reviewed and unremarkable. On physical exam she has no focal neurological deficits. No nuchal rigidity. Differential diagnosis includes but not limited to migraine, intracranial hemorrhage (subarachnoid, intraparenchymal, subdural), meningitis, encephalitis, intracranial mass, CVA, sinusitis, dehydration.    Reassuring exam with no red flag symptoms. Do not feel emergent neuroimaging is indicated at this time. Long standing history of migraines and she follows closely with neurology. Patient reports IVF have helped in the past. Fluids and migraine cocktail of toradol, reglan and benadryl ordered. When nursing staff called her name in the lobby to start IV and meds, patient was no longer there.     Medical Decision Making    History:    Supplemental history from: Documented in chart, if applicable    External Record(s) reviewed: Documented in chart, if applicable.    Work Up:    Chart documentation includes  differential considered and any EKGs or imaging independently interpreted by provider, where specified.    In additional to work up documented, I considered the following work up: Documented in chart, if applicable.    External consultation:    Discussion of management with another provider: Documented in chart, if applicable    Complicating factors:    Care impacted by chronic illness: Chronic Pain    Care affected by social determinants of health: N/A    Disposition considerations: Patient eloped from ED without notifying staff      0 minutes of critical care time     ED COURSE:  9:23 AM I met with the patient, obtained history, performed an initial exam, and discussed options and plan for diagnostics and treatment here in the ED.  12:47 PM Patient eloped from the ED without notifying staff.       MEDICATIONS GIVEN IN THE EMERGENCY:  Medications - No data to display    NEW PRESCRIPTIONS STARTED AT TODAY'S ER VISIT  Discharge Medication List as of 3/1/2023 12:48 PM               =================================================================    HPI:    Patient information was obtained from: Patient    Use of Interpretor: N/A       Mary Bueno is a 22 year old female with a pertinent history of migraines, obesity, GERD, recurrent UTI, and adjustment disorder with mixed anxiety and depressed mood who presents to this ED by walk in for evaluation of a headache.    Per Chart Review,  9/12/22 The patient presented to [facility] [place] for evaluation of intractable chronic migraine without aura and with status migrainosus. Patient was continued on Aimovig 140 mg once a month. Patient was started on Ubrelvy 100 mg tablet to try as an acute rescue medicine. Patient had previously failed numerous triptans including sumatriptan, rizatriptan, zolmitriptan, frovatriptan. Patient had also tried metoclopramide and Dilaudid without much benefit.    Patient reports that she has had a migraine  for about a month now. She says that her migraine is similar to her typical migraines and has been constant and progressively worsening since onset. Patient reports that she takes Ubrelvy for her migraines but says that it has not been working to relieve her pain recently. She also notes that dilaudid typically helps her migraines but it has also not been effective in relieving her pain. Patient endorses intermittent blurry vision and nausea associated with her migraines. She notes that she has not seen her neurologist in a few months. Patient denies fevers, chills, cough, loss of appetite, recent medication changes, or any other concerns at this time.    REVIEW OF SYSTEMS:  Review of Systems   Constitutional: Negative for appetite change, chills and fever.   Eyes: Positive for visual disturbance.   Respiratory: Negative for cough.    Gastrointestinal: Positive for nausea.   Neurological: Positive for headaches.   All other systems reviewed and are negative.      PAST MEDICAL HISTORY:  Past Medical History:   Diagnosis Date     Migraines      Uncomplicated asthma        PAST SURGICAL HISTORY:  Past Surgical History:   Procedure Laterality Date     ARTHROSCOPY ANKLE Right 1/26/2017    Procedure: ARTHROSCOPY ANKLE;  Surgeon: Davis Mayfield DPM;  Location: [facility] OR     DILATION AND CURETTAGE, HYSTEROSCOPY DIAGNOSTIC, COMBINED N/A 7/19/2021    Procedure: HYSTEROSCOPY, DIAGNOSTIC, WITH DILATION AND CURETTAGE OF UTERUS;  Surgeon: Ryan Salmreon MD;  Location: [facility] OR     NO HISTORY OF SURGERY       TONSILLECTOMY, ADENOIDECTOMY, COMBINED Bilateral 5/13/2022    Procedure: TONSILLECTOMY  and ADENOIDECTOMY;  Surgeon: Zach Cross MD;  Location: [facility] OR           CURRENT MEDICATIONS:    No current facility-administered medications for this encounter.    Current Outpatient Medications:      Cholecalciferol (VITAMIN D3) 50 MCG (2000 UT) CAPS, Take 1 capsule by mouth daily, Disp: 90 capsule, Rfl: 3      clindamycin-benzoyl peroxide (BENZACLIN) 1-5 % external gel, Apply topically daily To face, legs, underarms and buttocks once daily, Disp: 100 g, Rfl: 3     clotrimazole (LOTRIMIN) 1 % external cream, Apply topically 2 times daily To perineum as needed, Disp: 15 g, Rfl: 1     DULoxetine (CYMBALTA) 20 MG capsule, Take 1 cap for2 weeks then increase to 2 caps in the am, Disp: 60 capsule, Rfl: 3     etonogestrel (IMPLANON/NEXPLANON) 68 MG IMPL, 1 each (68 mg) by Subdermal route continuous, Disp: , Rfl: 0     fluconazole (DIFLUCAN) 150 MG tablet, Take 1 tablet (150 mg) by mouth every 72 hours, Disp: 3 tablet, Rfl: 3     hydrOXYzine (ATARAX) 25 MG tablet, Take 1-2 tablets (25-50 mg) by mouth every 6 hours as needed for anxiety, Disp: 60 tablet, Rfl: 1     ondansetron (ZOFRAN ODT) 8 MG ODT tab, , Disp: , Rfl:      pantoprazole (PROTONIX) 40 MG EC tablet, Take 1 tablet (40 mg) by mouth daily, Disp: 90 tablet, Rfl: 3     promethazine (PHENERGAN) 25 MG tablet, Take 1 tablet (25 mg) by mouth every 8 hours as needed for nausea, Disp: 60 tablet, Rfl: 1     Semaglutide-Weight Management (WEGOVY) 0.25 MG/0.5ML SOAJ, Inject 0.25 mg Subcutaneous every 7 days, Disp: 2 mL, Rfl: 0     Semaglutide-Weight Management (WEGOVY) 0.25 MG/0.5ML SOAJ, Inject 0.5 mg Subcutaneous every 7 days, Disp: 2 mL, Rfl: 0     sertraline (ZOLOFT) 100 MG tablet, Take 1 tablet (100 mg) by mouth daily, Disp: 90 tablet, Rfl: 3     traZODone (DESYREL) 50 MG tablet, Try the trazodone Start with 1/2 tablet an hour before bedtime. You can increase by 1/2 tablet every few nights to a maximum of 3 tablets. If you are groggy the next day after changing doses try cutting back to the dose you were taking before. Let me know if you need to try something else or if you are having side effects from this., Disp: 90 tablet, Rfl: 3     ubrogepant (UBRELVY) 100 MG tablet, Take 1 tablet (100 mg) by mouth at onset of headache (migraine) Max one pill/24 hours, Disp: 16 tablet,  "Rfl: 11     valACYclovir (VALTREX) 1000 mg tablet, Take 1 tablet (1,000 mg) by mouth 2 times daily For 1 day at onset cold sore, Disp: 8 tablet, Rfl: 3     valACYclovir (VALTREX) 500 MG tablet, Take 1 tablet (500 mg) by mouth daily, Disp: 90 tablet, Rfl: 3     Vitamin D, Cholecalciferol, 50 MCG (2000 UT) CAPS, Take 50 mcg by mouth daily, Disp: , Rfl:       ALLERGIES:  No Known Allergies    FAMILY HISTORY:  Family History   Problem Relation Age of Onset     Migraines Mother      Other - See Comments Father 31        MVA     Migraines Maternal Grandmother      Migraines Maternal Aunt      Coronary Artery Disease No family hx of      Other Cancer No family hx of        SOCIAL HISTORY:   Social History     Socioeconomic History     Marital status: Single     Number of children: 0     Years of education: 6   Occupational History     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   Tobacco Use     Smoking status: Never     Smokeless tobacco: Never   Vaping Use     Vaping Use: Some days     Substances: Nicotine   Substance and Sexual Activity     Alcohol use: No     Alcohol/week: 0.0 standard drinks     Drug use: No     Sexual activity: Yes     Partners: Male     Birth control/protection: Implant       VITALS:  Vitals:    03/01/23 0912   BP: 131/62   Pulse: 90   Resp: 12   Temp: 98.1  F (36.7  C)   TempSrc: Temporal   SpO2: 99%   Weight: 108.9 kg (240 lb)   Height: 1.651 m (5' 5\")       PHYSICAL EXAM  Constitutional: Well developed, Well nourished, NAD  HENT: Normocephalic, Atraumatic, Bilateral external ears normal, Oropharynx normal, mucous membranes moist, Nose normal.   Neck: Normal range of motion, No tenderness, Supple, No stridor.  Eyes: PERRL, EOMI, Conjunctiva normal, No discharge.   Respiratory: Normal breath sounds, No respiratory distress, No wheezing, Speaks full sentences easily. No cough.  Cardiovascular: Normal heart rate, Regular rhythm, No murmurs, No rubs, No gallops. Chest wall nontender.  GI: Soft, No tenderness, No masses, No flank " tenderness. No rebound or guarding.  Musculoskeletal: 2+ DP pulses. No edema. No cyanosis, No clubbing. Good range of motion in all major joints. No tenderness to palpation or major deformities noted. No tenderness of the CTLS spine.   Integument: Warm, Dry, No erythema, No rash. No petechiae.  Neurologic: Patient is alert and oriented ×3. Face is symmetric. Speech is normal. Visual fields are full. Cranial nerves II through XII are intact. Strength is full and equal in both upper and lower extremities. Sensory is intact. Patient has a normal steady gait. Coordination is intact. Normal Romberg and finger nose to finger.  Psychiatric: Affect normal, Judgment normal, Mood normal. Cooperative.      I, Tobin Champagne, am serving as a scribe to document services personally performed by Ingris Garcia PA-C based on my observation and the provider's statements to me. Ingris ROQUE PA-C attest that Tobin Champagne is acting in a scribe capacity, has observed my performance of the services and has documented them in accordance with my direction.    Ingris Garcia PA-C  Emergency Medicine  [facility]  3/1/2023       Ingris Garcia PA-C  03/02/23 2121

## 2023-03-02 DIAGNOSIS — F43.22 ADJUSTMENT DISORDER WITH ANXIOUS MOOD: ICD-10-CM

## 2023-03-02 RX ORDER — HYDROXYZINE HYDROCHLORIDE 25 MG/1
25-50 TABLET, FILM COATED ORAL EVERY 6 HOURS PRN
Qty: 60 TABLET | Refills: 1 | Status: SHIPPED | OUTPATIENT
Start: 2023-03-02

## 2023-03-03 NOTE — TELEPHONE ENCOUNTER
"Last Written Prescription Date: 5/3/22  Last Fill Quantity: 60, # refills: 1  Last office visit provider: 2/09/23      Requested Prescriptions   Pending Prescriptions Disp Refills     hydrOXYzine (ATARAX) 25 MG tablet [Pharmacy Med Name: hydroxyzine HCl 25 mg tablet] 60 tablet 1     Sig: Take 1-2 tablets (25-50 mg) by mouth every 6 hours as needed for anxiety       Antihistamines Protocol Passed - 3/2/2023  8:02 AM        Passed - Recent (12 mo) or future (30 days) visit within the authorizing provider's specialty     Patient has had an office visit with the authorizing provider or a provider within the authorizing providers department within the previous 12 mos or has a future within next 30 days. See \"Patient Info\" tab in inbasket, or \"Choose Columns\" in Meds & Orders section of the refill encounter.              Passed - Patient is age 3 or older     Apply age and/or weight-based dosing for peds patients age 3 and older.    Forward request to provider for patients under the age of 3.          Passed - Medication is active on med list                 Izzy Fagan RN 03/02/23 9:25 PM  "

## 2023-03-07 ENCOUNTER — TELEPHONE (OUTPATIENT)
Dept: NEUROLOGY | Facility: CLINIC | Age: 23
End: 2023-03-07
Payer: COMMERCIAL

## 2023-03-07 NOTE — TELEPHONE ENCOUNTER
Prior Authorization Retail Medication Request    Medication/Dose: Ubrelvy 100 mg  ICD code (if different than what is on RX):    Previously Tried and Failed:  Propranolol (unclear time)  Depakote ( a few weeks)  Amitriptyline (unclear time)  Topiramate (1.5 months)  Acetazoloamide     Abortive attempts:  Frove  Zomig  Sumatriptan  Rationale:  Chronic migraine     Rationale:      Insurance Name:  [employer]  Insurance ID:  [id number]       Pharmacy Information (if different than what is on RX)  Name:    Phone:

## 2023-03-09 NOTE — TELEPHONE ENCOUNTER
Prior Authorization Approval    Authorization Effective Date: 2/7/2023  Authorization Expiration Date: 3/8/2024  Medication: Ubrelvy 100 mg  Approved Dose/Quantity:   Reference #:     Insurance Company: [employer] - Phone [phone] Fax [phone]  Expected CoPay:       CoPay Card Available:      Foundation Assistance Needed:    Which Pharmacy is filling the prescription (Not needed for infusion/clinic administered): [facility] - [place], [place] - [address] [place] [address]  Pharmacy Notified: Yes  Patient Notified: No

## 2023-03-15 ENCOUNTER — TELEPHONE (OUTPATIENT)
Dept: FAMILY MEDICINE | Facility: CLINIC | Age: 23
End: 2023-03-15
Payer: COMMERCIAL

## 2023-03-15 NOTE — TELEPHONE ENCOUNTER
Received call from Patient  States that she has been having covid symptoms since last Thursday and tested positive on 3/11/23  States that she is on her las day of quarantine and is not feeling any better  Nose and ears are plugged   Cough go bad on Monday night, and now it hurts when she coughs  Has had a headache the whole time, tylenol is not helping her Migraine  Decongestants not working  Recommended humidifier, cough syrup and to treat the symptoms  Discussed red flag symptoms with Patient  Verbalized understanding  Shantanu Oliveros RN

## 2023-03-22 ENCOUNTER — E-VISIT (OUTPATIENT)
Dept: FAMILY MEDICINE | Facility: CLINIC | Age: 23
End: 2023-03-22
Payer: COMMERCIAL

## 2023-03-22 DIAGNOSIS — Z53.9 ERRONEOUS ENCOUNTER--DISREGARD: ICD-10-CM

## 2023-03-22 DIAGNOSIS — N94.9 VAGINAL SYMPTOM: Primary | ICD-10-CM

## 2023-03-22 PROCEDURE — 99207 PR NO CHARGE LOS: CPT | Performed by: FAMILY MEDICINE

## 2023-04-04 ENCOUNTER — MYC MEDICAL ADVICE (OUTPATIENT)
Dept: NEUROLOGY | Facility: CLINIC | Age: 23
End: 2023-04-04
Payer: COMMERCIAL

## 2023-04-07 NOTE — TELEPHONE ENCOUNTER
Spoke with patient, scheduled virtual visit 4/11 at 10:30am.     Josue Barker on 4/7/2023 at 12:29 PM

## 2023-04-11 ENCOUNTER — VIRTUAL VISIT (OUTPATIENT)
Dept: NEUROLOGY | Facility: CLINIC | Age: 23
End: 2023-04-11
Payer: COMMERCIAL

## 2023-04-11 DIAGNOSIS — G43.709 CHRONIC MIGRAINE WITHOUT AURA WITHOUT STATUS MIGRAINOSUS, NOT INTRACTABLE: Primary | ICD-10-CM

## 2023-04-11 PROCEDURE — 99214 OFFICE O/P EST MOD 30 MIN: CPT | Mod: VID | Performed by: PSYCHIATRY & NEUROLOGY

## 2023-04-11 RX ORDER — ERENUMAB-AOOE 140 MG/ML
140 INJECTION, SOLUTION SUBCUTANEOUS
COMMUNITY
Start: 2023-03-06 | End: 2023-04-11

## 2023-04-11 RX ORDER — ATOGEPANT 60 MG/1
60 TABLET ORAL EVERY MORNING
Qty: 30 TABLET | Refills: 11 | Status: SHIPPED | OUTPATIENT
Start: 2023-04-11 | End: 2023-06-30

## 2023-04-11 ASSESSMENT — MIGRAINE DISABILITY ASSESSMENT (MIDAS)
TOTAL SCORE: 250
HOW OFTEN WERE SOCIAL ACTIVITIES MISSED DUE TO HEADACHES: 40
HOW MANY DAYS WAS YOUR PRODUCTIVITY CUT IN HALF BECAUSE OF HEADACHES: 60
HOW MANY DAYS DID YOU NOT DO HOUSEWORK BECAUSE OF HEADACHES: 55
ON A SCALE FROM 0-10 ON AVERAGE HOW PAINFUL WERE HEADACHES: 7
HOW MANY DAYS IN THE PAST 3 MONTHS HAVE YOU HAD A HEADACHE: 85
HOW MANY DAYS DID YOU MISS WORK OR SCHOOL BECAUSE OF HEADACHES: 40
HOW MANY DAYS WAS HOUSEWORK PRODUCTIVITY CUT IN HALF DUE TO HEADACHES: 55

## 2023-04-11 ASSESSMENT — HEADACHE IMPACT TEST (HIT 6)
HOW OFTEN DO HEADACHES LIMIT YOUR DAILY ACTIVITIES: VERY OFTEN
HOW OFTEN DO HEADACHES LIMIT YOUR DAILY ACTIVITIES: VERY OFTEN
WHEN YOU HAVE HEADACHES HOW OFTEN IS THE PAIN SEVERE: VERY OFTEN
HOW OFTEN HAVE YOU FELT FED UP OR IRRITATED BECAUSE OF YOUR HEADACHES: ALWAYS
HOW OFTEN DID HEADACHS LIMIT CONCENTRATION ON WORK OR DAILY ACTIVITY: VERY OFTEN
HIT6 TOTAL SCORE: 68
HOW OFTEN DID HEADACHS LIMIT CONCENTRATION ON WORK OR DAILY ACTIVITY: VERY OFTEN
WHEN YOU HAVE HEADACHES HOW OFTEN IS THE PAIN SEVERE: VERY OFTEN
WHEN YOU HAVE A HEADACHE HOW OFTEN DO YOU WISH YOU COULD LIE DOWN: VERY OFTEN
WHEN YOU HAVE A HEADACHE HOW OFTEN DO YOU WISH YOU COULD LIE DOWN: VERY OFTEN
HOW OFTEN HAVE YOU FELT TOO TIRED TO WORK BECAUSE OF YOUR HEADACHES: VERY OFTEN
HOW OFTEN HAVE YOU FELT TOO TIRED TO WORK BECAUSE OF YOUR HEADACHES: VERY OFTEN
HIT6 TOTAL SCORE: 68
HOW OFTEN HAVE YOU FELT FED UP OR IRRITATED BECAUSE OF YOUR HEADACHES: ALWAYS

## 2023-04-11 NOTE — LETTER
4/11/2023       RE: Mary Bueno  [address]  [facility] [address]     Dear Colleague,    Thank you for referring your patient, Mary Bueno, to the [facility]. Please see a copy of my visit note below.            [facility]    Mary Bueno MRN# [id number]   Age: 22 year old YOB: 2000     Requesting physician: Referred Self  Anabel Silver            Assessment and Plan:     (G43.709) Chronic migraine without aura without status migrainosus, not intractable  (primary encounter diagnosis)  Comment: The patient has intractable migraine headaches that are currently resulting in significant disability.  Her current Midas score is 250 and a grade 4 severe disability range.  We need to change her preventive approach.  I discussed with her that I think Botox would be a good option but she is very hesitant to do so because of the needles.  She would like to try oral Qulipta first.  We will see if we can get this approved.  She understands this will mean she cannot use Ubrelvy as a rescue.  Once Qulipta is approved we could consider retrying triptans for rescue.  Plan: Atogepant (QULIPTA) 60 MG TABS daily          The patient has a severe chronic medical condition that is currently exacerbated and resulting in significant disability.  Today we discussed several pharmaceutical options including Botox, Qulipta and Ajovy.  After the appointment the patient wanted to talk to her mother and contacted me via [employer] with her choice of Qulipta.    Janna Aragon MD             History of Present Illness:   CC: Reyna Feliciano is a 22-year-old woman who has not been seen since September.  She has intractable chronic migraine headaches.  She had she reports that things are worse than when she was last seen and she estimates that things took a turn for the worse around November with no clear  trigger.  She is currently reporting 85 days out of 90 days with some significant level of headache.  In actuality she thinks her head hurts every day but the pain will fluctuate from moderate to severe.    Her insurance company is not covering Aimovig.  She is using Ubrelvy currently.    In the past she has tried the following medications:  Propranolol  Depakote  Amitriptyline  Topiramate  Acetazolamide  Emgality  Aimovig             Physical Exam:     Deferred         Pertinent History/Data for appointment:           Again, thank you for allowing me to participate in the care of your patient.      Sincerely,    Janna Aragon MD

## 2023-04-11 NOTE — PROGRESS NOTES
Virtual Visit Details    Type of service:  Video Visit     Originating Location (pt. Location): Home  Distant Location (provider location):  on site  Platform used for Video Visit: [facility]    Mary Bueno MRN# [id number]   Age: 22 year old YOB: 2000     Requesting physician: Referred Self  Anabel Silver            Assessment and Plan:     (G43.007) Chronic migraine without aura without status migrainosus, not intractable  (primary encounter diagnosis)  Comment: The patient has intractable migraine headaches that are currently resulting in significant disability.  Her current Midas score is 250 and a grade 4 severe disability range.  We need to change her preventive approach.  I discussed with her that I think Botox would be a good option but she is very hesitant to do so because of the needles.  She would like to try oral Qulipta first.  We will see if we can get this approved.  She understands this will mean she cannot use Ubrelvy as a rescue.  Once Qulipta is approved we could consider retrying triptans for rescue.  Plan: Atogepant (QULIPTA) 60 MG TABS daily          The patient has a severe chronic medical condition that is currently exacerbated and resulting in significant disability.  Today we discussed several pharmaceutical options including Botox, Qulipta and Ajovy.  After the appointment the patient wanted to talk to her mother and contacted me via [employer] with her choice of Qulipta.    Janna Aragon MD             History of Present Illness:   CC: Reyna Feliciano is a 22-year-old woman who has not been seen since September.  She has intractable chronic migraine headaches.  She had she reports that things are worse than when she was last seen and she estimates that things took a turn for the worse around November with no clear trigger.  She is currently reporting 85 days out of 90 days with some significant level of headache.  In actuality she thinks her  head hurts every day but the pain will fluctuate from moderate to severe.    Her insurance company is not covering Aimovig.  She is using Ubrelvy currently.    In the past she has tried the following medications:  Propranolol  Depakote  Amitriptyline  Topiramate  Acetazolamide  Emgality  Aimovig             Physical Exam:     Deferred         Pertinent History/Data for appointment:

## 2023-04-11 NOTE — NURSING NOTE
Chief Complaint   Patient presents with     Video Visit     Follow up - Headaches/migraines      Is the patient currently in the state of [place]? YES    Visit mode:VIDEO    If the visit is dropped, the patient can be reconnected by: VIDEO VISIT: Text to cell phone: [phone]    Will anyone else be joining the visit? NO      How would you like to obtain your AVS? MyChart    Are changes needed to the allergy or medication list? NO    Reason for visit: Follow up - headaches / migraines

## 2023-04-17 ENCOUNTER — TELEPHONE (OUTPATIENT)
Dept: NEUROLOGY | Facility: CLINIC | Age: 23
End: 2023-04-17
Payer: COMMERCIAL

## 2023-04-17 NOTE — TELEPHONE ENCOUNTER
Prior Authorization Retail Medication Request    Medication/Dose: Qulipta 60 mg  ICD code (if different than what is on RX):  G43.709  Previously Tried and Failed:  In the past she has tried the following medications:  Propranolol  Depakote  Amitriptyline  Topiramate  Acetazolamide  Emgality  Aimovig     Rationale:         The patient has a severe chronic medical condition that is currently exacerbated and resulting in significant disability    Insurance Name:  [employer]  Insurance ID:  [id number]     Pharmacy Information (if different than what is on RX)  Name:    Phone:

## 2023-05-09 ENCOUNTER — OFFICE VISIT (OUTPATIENT)
Dept: OBGYN | Facility: CLINIC | Age: 23
End: 2023-05-09
Payer: COMMERCIAL

## 2023-05-09 VITALS
RESPIRATION RATE: 18 BRPM | SYSTOLIC BLOOD PRESSURE: 123 MMHG | HEIGHT: 65 IN | BODY MASS INDEX: 41.15 KG/M2 | WEIGHT: 247 LBS | HEART RATE: 90 BPM | DIASTOLIC BLOOD PRESSURE: 78 MMHG | TEMPERATURE: 98.1 F

## 2023-05-09 DIAGNOSIS — Z01.419 ENCOUNTER FOR GYNECOLOGICAL EXAMINATION WITHOUT ABNORMAL FINDING: Primary | ICD-10-CM

## 2023-05-09 DIAGNOSIS — Z30.430 ENCOUNTER FOR IUD INSERTION: ICD-10-CM

## 2023-05-09 DIAGNOSIS — Z30.46 ENCOUNTER FOR NEXPLANON REMOVAL: ICD-10-CM

## 2023-05-09 LAB
HCG UR QL: NEGATIVE
INTERNAL QC OK POCT: NORMAL
POCT KIT EXPIRATION DATE: NORMAL
POCT KIT LOT NUMBER: NORMAL

## 2023-05-09 PROCEDURE — 87591 N.GONORRHOEAE DNA AMP PROB: CPT | Performed by: OBSTETRICS & GYNECOLOGY

## 2023-05-09 PROCEDURE — 58300 INSERT INTRAUTERINE DEVICE: CPT | Performed by: OBSTETRICS & GYNECOLOGY

## 2023-05-09 PROCEDURE — 11982 REMOVE DRUG IMPLANT DEVICE: CPT | Performed by: OBSTETRICS & GYNECOLOGY

## 2023-05-09 PROCEDURE — 81025 URINE PREGNANCY TEST: CPT | Performed by: OBSTETRICS & GYNECOLOGY

## 2023-05-09 PROCEDURE — 87491 CHLMYD TRACH DNA AMP PROBE: CPT | Performed by: OBSTETRICS & GYNECOLOGY

## 2023-05-09 RX ORDER — IBUPROFEN 200 MG
800 TABLET ORAL ONCE
Status: COMPLETED | OUTPATIENT
Start: 2023-05-09 | End: 2023-05-09

## 2023-05-09 RX ADMIN — Medication 600 MG: at 15:00

## 2023-05-09 NOTE — PROGRESS NOTES
"  IUD Insertion:  CONSULT:    Is a pregnancy test required: Yes.  Was it positive or negative?  Negative  Was a consent obtained?  Yes    Subjective: Mary Bueno is a 22 year old  presents for Nexplanon removal and  IUD placement  and desires Kyleena type IUD.    Patient has been given the opportunity to ask questions about all forms of birth control, including all options appropriate for Mary Bueno. Discussed that no method of birth control, except abstinence is 100% effective against pregnancy or sexually transmitted infection.     Mary Bueno understands she may have the IUD removed at any time. IUD should be removed by a health care provider.    The entire insertion procedure was reviewed with the patient, including care after placement.    Patient's last menstrual period was 2023 (approximate). Has current contraception. No allergy to betadine or shellfish. Recent STD screening  HCG Qual Urine   Date Value Ref Range Status   2023 Negative Negative Final         /78 (BP Location: Left arm, Patient Position: Sitting, Cuff Size: Adult Large)   Pulse 90   Temp 98.1  F (36.7  C)   Resp 18   Ht 1.651 m (5' 5\")   Wt 112 kg (247 lb)   LMP 2023 (Approximate)   BMI 41.10 kg/m      Pelvic Exam:   EG/BUS: normal genital architecture without lesions, erythema or abnormal secretions.   Vagina: moist, pink, rugae with physiologic discharge and secretions  Cervix: nulliparous no lesions and pink, moist, closed, without lesion or CMT  Uterus: midposition, mobile, no pain  Adnexa: within normal limits and no masses, nodularity, tenderness    PROCEDURE NOTE: -- IUD Insertion    Reason for Insertion: contraception    Premedicated with ibuprofen. Paracervical block performed.  Under sterile technique, cervix was visualized with speculum and prepped with Betadine solution swab x 3. Tenaculum was placed for stability. The uterus was gently straightened and sounded to 7.0 " cm. IUD prepared for placement, and IUD inserted according to 's instructions without difficulty or significant resitance, and deployed at the fundus. The strings were visualized and trimmed to 2.0 cm from the external os. Tenaculum was removed and hemostasis noted. Speculum removed.  Patient tolerated procedure well.    Lot # [id number]  Exp: AUG 2024    EBL: minimal    Complications: none    ASSESSMENT:     ICD-10-CM    1. Encounter for routine gynecological examination  Z01.419 Chlamydia trachomatis/Neisseria gonorrhoeae by PCR     HCG qualitative urine POCT      2. Encounter for IUD insertion  Z30.430 ibuprofen (ADVIL/MOTRIN) tablet 800 mg     levonorgestrel (KYLEENA) 19.5 MG IUD 19.5 mg      3. Encounter for intrauterine device placement  Z30.430       4. Encounter for insertion of intrauterine contraceptive device  Z30.430 levonorgestrel (KYLEENA) 19.5 MG IUD     levonorgestrel (KYLEENA) 19.5 MG IUD 19.5 mg     INSERTION INTRAUTERINE DEVICE      5. Encounter for Nexplanon removal  Z30.46            PLAN:    Given 's handouts, including when to have IUD removed, list of danger s/sx, side effects and follow up recommended. Encouraged condom use for prevention of STD. Back up contraception advised for 7 days if progestin method. Advised to call for any fever, for prolonged or severe pain or bleeding, abnormal vaginal discharge, or unable to palpate strings. She was advised to use pain medications (ibuprofen) as needed for mild to moderate pain. Advised to follow-up in clinic in 4-6 weeks for IUD string check if unable to find strings or as directed by provider.     Ryan Salmeron MD    Nexplanon Removal:     Is a pregnancy test required: Yes.  Was it positive or negative?  Negative  Was a consent obtained?  Yes    Mary Bueno is here for removal of etonogestrel implant Nexplanon/Implanon    Indication: excessive weight gain      Preoperative Diagnosis: etonogestrel  implant  Postoperative Diagnosis: etonogestrel implant removed    Technique: On the left arm  Skin prep Betadine  Anesthesia 1% lidocaine  Procedure: Small incision (<5mm) was made at distal end of palpable implant, curved hemostat or mosquito forceps was used to isolate the implant and bring it to the incision, the fibrous capsule containing the implant  was incised and the Implant was removed intact.      EBL: minimal  Complications:  No  Tolerance:  Pt tolerated procedure well and was in stable condition.   Dressing:    A pressure bandage was placed for the next 12-24 hours.    Contraception was discussed and patient chose the following method Kyleena IUD      Follow up: Pt was instructed to call if bleeding, severe pain or foul smell.     Ryan Salmeron MD

## 2023-05-09 NOTE — PROGRESS NOTES
Clinic Administered Medication Documentation    Patient was given Ibuprofen 600 mg . Prior to medication administration, verified patient's identity using patient's name and date of birth.    Mimi Cobb, CMA

## 2023-05-10 ENCOUNTER — TELEPHONE (OUTPATIENT)
Dept: FAMILY MEDICINE | Facility: CLINIC | Age: 23
End: 2023-05-10
Payer: COMMERCIAL

## 2023-05-10 ENCOUNTER — TELEPHONE (OUTPATIENT)
Dept: OBGYN | Facility: CLINIC | Age: 23
End: 2023-05-10
Payer: COMMERCIAL

## 2023-05-10 LAB
C TRACH DNA SPEC QL PROBE+SIG AMP: NEGATIVE
N GONORRHOEA DNA SPEC QL NAA+PROBE: NEGATIVE

## 2023-05-10 NOTE — TELEPHONE ENCOUNTER
FYI - Status Update    Who is Calling: patient    Update: Patient is having abdominal and cervix pain since having the IUD inserted yesterday.  Please call patient.     Does caller want a call/response back: Yes     Could we send this information to you in [employer] or would you prefer to receive a phone call?:   Patient would prefer a phone call   Okay to leave a detailed message?: Yes at Cell number on file:    Telephone Information:   Mobile [phone]

## 2023-05-10 NOTE — TELEPHONE ENCOUNTER
"Return call to patient.    S-(situation): patient had a Kyleena IUD placed yesterday - last evening extreme pain and cramping     B-(background): office visit yesterday for IUD placement    A-(assessment): cramping, \" severe pain last night 10/10\".  Patient reports unable to dress herself or walk due to the pain.  Patient has been taking Ibuprofen, last at 0615 and Tylenol, last at 0715.    Pain level right now 6-7/10  Patient reports feeling cramping in cervix area and in stomach around umbilicus.  Slight spotting.     No nausea or vomiting.  No fevers.    R-(recommendations): Reassurance provided. Continue to monitor, reviewed comfort measures. If pain is persistent or worsening, follow up recommended for assessment.   Reviewed red flags for ER evaluation.    Pt in agreement and reports understanding..    Ivanna PANDYA   [facility]       "

## 2023-05-13 ENCOUNTER — APPOINTMENT (OUTPATIENT)
Dept: ULTRASOUND IMAGING | Facility: CLINIC | Age: 23
End: 2023-05-13
Attending: EMERGENCY MEDICINE
Payer: COMMERCIAL

## 2023-05-13 ENCOUNTER — HOSPITAL ENCOUNTER (EMERGENCY)
Facility: CLINIC | Age: 23
Discharge: HOME | End: 2023-05-13
Attending: EMERGENCY MEDICINE | Admitting: EMERGENCY MEDICINE
Payer: COMMERCIAL

## 2023-05-13 ENCOUNTER — NURSE TRIAGE (OUTPATIENT)
Dept: NURSING | Facility: CLINIC | Age: 23
End: 2023-05-13
Payer: COMMERCIAL

## 2023-05-13 VITALS
RESPIRATION RATE: 14 BRPM | BODY MASS INDEX: 39.99 KG/M2 | HEIGHT: 65 IN | TEMPERATURE: 97.6 F | SYSTOLIC BLOOD PRESSURE: 123 MMHG | DIASTOLIC BLOOD PRESSURE: 88 MMHG | HEART RATE: 86 BPM | WEIGHT: 240 LBS | OXYGEN SATURATION: 100 %

## 2023-05-13 DIAGNOSIS — R10.84 ABDOMINAL PAIN, GENERALIZED: ICD-10-CM

## 2023-05-13 LAB
ALBUMIN UR-MCNC: NEGATIVE MG/DL
APPEARANCE UR: ABNORMAL
BACTERIA #/AREA URNS HPF: ABNORMAL /HPF
BILIRUB UR QL STRIP: NEGATIVE
COLOR UR AUTO: YELLOW
ERYTHROCYTE [DISTWIDTH] IN BLOOD BY AUTOMATED COUNT: 14.6 % (ref 10–15)
GLUCOSE UR STRIP-MCNC: NEGATIVE MG/DL
HCT VFR BLD AUTO: 39.1 % (ref 35–47)
HGB BLD-MCNC: 11.8 G/DL (ref 11.7–15.7)
HGB UR QL STRIP: NEGATIVE
HOLD SPECIMEN: NORMAL
HYALINE CASTS: 1 /LPF
KETONES UR STRIP-MCNC: NEGATIVE MG/DL
LEUKOCYTE ESTERASE UR QL STRIP: ABNORMAL
MCH RBC QN AUTO: 23.4 PG (ref 26.5–33)
MCHC RBC AUTO-ENTMCNC: 30.2 G/DL (ref 31.5–36.5)
MCV RBC AUTO: 78 FL (ref 78–100)
MUCOUS THREADS #/AREA URNS LPF: PRESENT /LPF
NITRATE UR QL: NEGATIVE
PH UR STRIP: 6 [PH] (ref 5–7)
PLATELET # BLD AUTO: 194 10E3/UL (ref 150–450)
RBC # BLD AUTO: 5.04 10E6/UL (ref 3.8–5.2)
RBC URINE: 2 /HPF
SP GR UR STRIP: 1.02 (ref 1–1.03)
SQUAMOUS EPITHELIAL: 9 /HPF
UROBILINOGEN UR STRIP-MCNC: NORMAL MG/DL
WBC # BLD AUTO: 7 10E3/UL (ref 4–11)
WBC URINE: 8 /HPF

## 2023-05-13 PROCEDURE — 76856 US EXAM PELVIC COMPLETE: CPT

## 2023-05-13 PROCEDURE — 81001 URINALYSIS AUTO W/SCOPE: CPT | Performed by: EMERGENCY MEDICINE

## 2023-05-13 PROCEDURE — 250N000011 HC RX IP 250 OP 636: Performed by: EMERGENCY MEDICINE

## 2023-05-13 PROCEDURE — 36415 COLL VENOUS BLD VENIPUNCTURE: CPT | Performed by: EMERGENCY MEDICINE

## 2023-05-13 PROCEDURE — 96375 TX/PRO/DX INJ NEW DRUG ADDON: CPT | Performed by: EMERGENCY MEDICINE

## 2023-05-13 PROCEDURE — 85027 COMPLETE CBC AUTOMATED: CPT | Performed by: EMERGENCY MEDICINE

## 2023-05-13 PROCEDURE — 96374 THER/PROPH/DIAG INJ IV PUSH: CPT | Performed by: EMERGENCY MEDICINE

## 2023-05-13 PROCEDURE — 99284 EMERGENCY DEPT VISIT MOD MDM: CPT | Performed by: EMERGENCY MEDICINE

## 2023-05-13 PROCEDURE — 99285 EMERGENCY DEPT VISIT HI MDM: CPT | Mod: 25 | Performed by: EMERGENCY MEDICINE

## 2023-05-13 RX ORDER — KETOROLAC TROMETHAMINE 15 MG/ML
10 INJECTION, SOLUTION INTRAMUSCULAR; INTRAVENOUS
Status: COMPLETED | OUTPATIENT
Start: 2023-05-13 | End: 2023-05-13

## 2023-05-13 RX ORDER — MORPHINE SULFATE 10 MG/5ML
10 SOLUTION ORAL EVERY 6 HOURS PRN
Qty: 15 ML | Refills: 0 | Status: SHIPPED | OUTPATIENT
Start: 2023-05-13 | End: 2023-08-03

## 2023-05-13 RX ORDER — HYDROMORPHONE HYDROCHLORIDE 1 MG/ML
0.5 INJECTION, SOLUTION INTRAMUSCULAR; INTRAVENOUS; SUBCUTANEOUS EVERY 30 MIN PRN
Status: DISCONTINUED | OUTPATIENT
Start: 2023-05-13 | End: 2023-05-13 | Stop reason: HOSPADM

## 2023-05-13 RX ADMIN — KETOROLAC TROMETHAMINE 10 MG: 15 INJECTION, SOLUTION INTRAMUSCULAR; INTRAVENOUS at 12:55

## 2023-05-13 RX ADMIN — HYDROMORPHONE HYDROCHLORIDE 0.5 MG: 1 INJECTION, SOLUTION INTRAMUSCULAR; INTRAVENOUS; SUBCUTANEOUS at 12:55

## 2023-05-13 ASSESSMENT — ENCOUNTER SYMPTOMS
CONSTITUTIONAL NEGATIVE: 1
HEMATOLOGIC/LYMPHATIC NEGATIVE: 1
ENDOCRINE NEGATIVE: 1
RESPIRATORY NEGATIVE: 1
EYES NEGATIVE: 1
NEUROLOGICAL NEGATIVE: 1
MUSCULOSKELETAL NEGATIVE: 1
CARDIOVASCULAR NEGATIVE: 1
ABDOMINAL PAIN: 1
PSYCHIATRIC NEGATIVE: 1
ALLERGIC/IMMUNOLOGIC NEGATIVE: 1

## 2023-05-13 ASSESSMENT — ACTIVITIES OF DAILY LIVING (ADL)
ADLS_ACUITY_SCORE: 37
ADLS_ACUITY_SCORE: 37

## 2023-05-13 NOTE — DISCHARGE INSTRUCTIONS
1) Your evaluation today did not reveal any complication after your IUD placement.  Ultrasound revealed a normal position.  We have discussed your desire to keep the IUD in place if you change your mind you can schedule a visit in the gynecology clinic where the IUD can be.  We discussed duration of expected abdominal cramping and discomfort which can be a few days to 1 to 2 weeks after placement.    2) IF you develop a fever or abnormal vaginal discharge or increasing pain or discomfort you may return to be reevaluated.  Be sure to follow-up with your clinic provider for medication management

## 2023-05-13 NOTE — ED NOTES
"IUD placed Tuesday. Now with consistent cramping, white discharge. Called the nurse advice line. Holding heat to area helps pain, took tylenol and ibuprofen at 0915 this am. Nurse advice line advised ultrasound to evaluate placement. Pt reports last menstral period was the \" entire month of April\"    Pt is alert, oriented in NAD. Complains of pain, talking in fulls sentances. Last BM yesterday, normal. Pt appears well hydrated, CMS intact. Will await further orders.   "

## 2023-05-13 NOTE — TELEPHONE ENCOUNTER
Nurse Triage SBAR    Is this a 2nd Level Triage? NO    Situation: IUD placed Tuesday, severe cramps    Assessment: IUD placed Tuesday, is still having severe cramps 8/10 states she feels like passing out every night since it was placed also.     Protocol Recommended Disposition:   Go to ED Now     Tara Moreno RN on 5/13/2023 at 10:00 AM      Reason for Disposition    [1] SEVERE abdominal pain (e.g., excruciating) AND [2] present > 1 hour    Additional Information    Negative: Shock suspected (e.g., cold/pale/clammy skin, too weak to stand, low BP, rapid pulse)    Negative: Sounds like a life-threatening emergency to the triager    Negative: [1] Abdominal pain AND [2] pregnant < 20 weeks    Negative: [1] Vaginal bleeding AND [2] pregnant < 20 weeks    Negative: Vaginal discharge is main symptom    Protocols used: CONTRACEPTION - IUD SYMPTOMS AND [id number]

## 2023-05-13 NOTE — ED PROVIDER NOTES
History     Chief Complaint   Patient presents with     Abdominal Pain     Since IUD placed on Wednesday.      HPI  Mary Bueno is a 22 year old female who presents with report of abdominal pain.  On intake patient reports an IUD was placed 4 days earlier.    Medical records show diagnosis of chronic migraine, endometrial thickening on ultrasound, history of obesity, depression with anxiety and recurrent UTIs.  Patient's prescribed medications were reviewed.    On examination patient was accompanied by her aunt reporting that since her IUD was placed 4 days ago she has had abdominal cramping and pelvic discomfort.  She has had no abnormal spotting and no active bleeding.  No fever chills no flank pain no back pain.  She was concerned that the IUD may need to be removed.    Allergies:  No Known Allergies    Problem List:    Patient Active Problem List    Diagnosis Date Noted     Encounter for intrauterine device placement 05/09/2023     Priority: Medium     Kyleena IUD insertion   NDC: 66735-456-90  Lot: [id number]  Exp: 8/2024       BMI 40.0-44.9, adult (H) 02/09/2023     Priority: Medium     Breakthrough bleeding on Nexplanon 06/22/2021     Priority: Medium     Added automatically from request for surgery [id number]       Endometrial thickening on ultrasound 06/22/2021     Priority: Medium     Added automatically from request for surgery [id number]       Nexplanon insertion 09/17/2020     Priority: Medium     Lot: [id number]  Exp: 10/2023    KELLEY Barnes  9/2020       Chronic migraine without aura without status migrainosus, not intractable 05/22/2019     Priority: Medium     Increased insulin level 05/25/2017     Priority: Medium     Current mild episode of major depressive disorder without prior episode (H) 07/11/2016     Priority: Medium     Gastroesophageal reflux disease, esophagitis presence not specified 07/11/2016     Priority: Medium     IMO Regulatory Load OCT 2020       Depression with  anxiety 07/02/2016     Priority: Medium     Obesity 05/14/2014     Priority: Medium     Adjustment disorder with mixed anxiety and depressed mood 09/20/2013     Priority: Medium     Weight disorder 06/18/2012     Priority: Medium     Recurrent UTI 09/16/2011     Priority: Medium     Referred for peds uro and u/s   - normal retroperitoneal u/s   - pending [facility] - mom hasn't yet scheduled ( 12/6/11)       Health Care Home 05/06/2013     Priority: Low     *See Letters for [facility] Care Plan: My Access Plan              Past Medical History:    Past Medical History:   Diagnosis Date     Migraines      Uncomplicated asthma        Past Surgical History:    Past Surgical History:   Procedure Laterality Date     ARTHROSCOPY ANKLE Right 1/26/2017    Procedure: ARTHROSCOPY ANKLE;  Surgeon: Davis Mayfield DPM;  Location: [facility]     DILATION AND CURETTAGE, HYSTEROSCOPY DIAGNOSTIC, COMBINED N/A 7/19/2021    Procedure: HYSTEROSCOPY, DIAGNOSTIC, WITH DILATION AND CURETTAGE OF UTERUS;  Surgeon: Ryan Salmeron MD;  Location: [facility] OR     NO HISTORY OF SURGERY       TONSILLECTOMY, ADENOIDECTOMY, COMBINED Bilateral 5/13/2022    Procedure: TONSILLECTOMY  and ADENOIDECTOMY;  Surgeon: Zach Cross MD;  Location: [facility] OR       Family History:    Family History   Problem Relation Age of Onset     Migraines Mother      Other - See Comments Father 31        MVA     Migraines Maternal Grandmother      Migraines Maternal Aunt      Coronary Artery Disease No family hx of      Other Cancer No family hx of        Social History:  Marital Status:  Single [1]  Social History     Tobacco Use     Smoking status: Never     Smokeless tobacco: Never   Vaping Use     Vaping status: Some Days     Substances: Nicotine     Passive vaping exposure: Yes   Substance Use Topics     Alcohol use: No     Alcohol/week: 0.0 standard drinks of alcohol     Drug use: No        Medications:    morphine 10 MG/5ML solution  Atogepant (QULIPTA) 60 MG  "TABS  Cholecalciferol (VITAMIN D3) 50 MCG (2000 UT) CAPS  clindamycin-benzoyl peroxide (BENZACLIN) 1-5 % external gel  clotrimazole (LOTRIMIN) 1 % external cream  DULoxetine (CYMBALTA) 20 MG capsule  etonogestrel (IMPLANON/NEXPLANON) 68 MG IMPL  fluconazole (DIFLUCAN) 150 MG tablet  hydrOXYzine (ATARAX) 25 MG tablet  levonorgestrel (KYLEENA) 19.5 MG IUD  ondansetron (ZOFRAN ODT) 8 MG ODT tab  pantoprazole (PROTONIX) 40 MG EC tablet  promethazine (PHENERGAN) 25 MG tablet  traZODone (DESYREL) 50 MG tablet  valACYclovir (VALTREX) 1000 mg tablet  valACYclovir (VALTREX) 500 MG tablet  Vitamin D, Cholecalciferol, 50 MCG (2000 UT) CAPS          Review of Systems   Constitutional: Negative.    HENT: Negative.    Eyes: Negative.    Respiratory: Negative.    Cardiovascular: Negative.    Gastrointestinal: Positive for abdominal pain.   Endocrine: Negative.    Genitourinary: Negative.    Musculoskeletal: Negative.    Skin: Negative.    Allergic/Immunologic: Negative.    Neurological: Negative.    Hematological: Negative.    Psychiatric/Behavioral: Negative.    All other systems reviewed and are negative.      Physical Exam   BP: 123/88  Pulse: 86  Temp: 97.6  F (36.4  C)  Resp: 20  Height: 165.1 cm (5' 5\")  Weight: 108.9 kg (240 lb)  SpO2: 99 %      Physical Exam  Constitutional:       General: She is not in acute distress.     Appearance: She is well-developed. She is not ill-appearing, toxic-appearing or diaphoretic.   HENT:      Head: Normocephalic and atraumatic.   Eyes:      Extraocular Movements: Extraocular movements intact.      Pupils: Pupils are equal, round, and reactive to light.   Cardiovascular:      Heart sounds: Normal heart sounds.   Pulmonary:      Effort: Pulmonary effort is normal. No respiratory distress.      Breath sounds: Normal breath sounds. No stridor. No wheezing, rhonchi or rales.   Chest:      Chest wall: No tenderness.   Abdominal:      General: There is no distension or abdominal bruit. There are " no signs of injury.      Palpations: Abdomen is soft.      Tenderness: There is generalized abdominal tenderness.   Skin:     Capillary Refill: Capillary refill takes less than 2 seconds.      Coloration: Skin is not cyanotic, jaundiced, mottled or pale.      Findings: No erythema or rash.   Neurological:      General: No focal deficit present.      Mental Status: She is alert and oriented to person, place, and time.   Psychiatric:         Mood and Affect: Mood normal. Mood is not anxious or depressed.         Behavior: Behavior normal.         ED Course                 Procedures              Critical Care time:  none               ED medications:  Medications   HYDROmorphone (PF) (DILAUDID) injection 0.5 mg (0.5 mg Intravenous $Given 5/13/23 1255)   ketorolac (TORADOL) injection 10 mg (10 mg Intravenous $Given 5/13/23 1255)     ED Vitals:  Vitals:    05/13/23 1400 05/13/23 1500 05/13/23 1515 05/13/23 1519   BP:    123/88   Pulse:    86   Resp:    14   Temp:       TempSrc:       SpO2: 98% 99% 100% 100%   Weight:       Height:         ED labs and imaging:  Results for orders placed or performed during the hospital encounter of 05/13/23   US Pelvic Complete with Transvaginal     Status: None    Narrative    EXAM: US PELVIC TRANSABDOMINAL AND TRANSVAGINAL  LOCATION: [facility]  DATE/TIME: 5/13/2023 1:59 PM CDT    INDICATION: Pelvic pain after IUD placement.  Evaluate for IUD malposition.  Other acute process.  COMPARISON: None.  TECHNIQUE: Transabdominal scans were performed. Endovaginal ultrasound was performed to better visualize the adnexa.    FINDINGS:    UTERUS: 6.2 x 2.7 x 3.8 cm. Normal in size and position with no masses.    ENDOMETRIUM: 3 mm. IUD centrally position projecting within the endometrial cavity.    RIGHT OVARY: 3.2 x 2.9 x 3.5 cm. Small dominant follicle or functional ovarian cyst.    LEFT OVARY: Not seen due to overlying bowel gas.    No significant free fluid.       Impression    IMPRESSION:  1.  IUD appears appropriately position.  2.  No acute process demonstrated.         CBC with platelets     Status: Abnormal   Result Value Ref Range    WBC Count 7.0 4.0 - 11.0 10e3/uL    RBC Count 5.04 3.80 - 5.20 10e6/uL    Hemoglobin 11.8 11.7 - 15.7 g/dL    Hematocrit 39.1 35.0 - 47.0 %    MCV 78 78 - 100 fL    MCH 23.4 (L) 26.5 - 33.0 pg    MCHC 30.2 (L) 31.5 - 36.5 g/dL    RDW 14.6 10.0 - 15.0 %    Platelet Count 194 150 - 450 10e3/uL   [facility] Draw     Status: None    Narrative    The following orders were created for panel order [facility] Draw.  Procedure                               Abnormality         Status                     ---------                               -----------         ------                     Extra Urine Collection[[id number]]                           Final result                 Please view results for these tests on the individual orders.   Extra Urine Collection     Status: None   Result Value Ref Range    Hold Specimen JI    UA with Microscopic reflex to Culture     Status: Abnormal    Specimen: Urine, Midstream   Result Value Ref Range    Color Urine Yellow Colorless, Straw, Light Yellow, Yellow    Appearance Urine Cloudy (A) Clear    Glucose Urine Negative Negative mg/dL    Bilirubin Urine Negative Negative    Ketones Urine Negative Negative mg/dL    Specific Gravity Urine 1.024 1.003 - 1.035    Blood Urine Negative Negative    pH Urine 6.0 5.0 - 7.0    Protein Albumin Urine Negative Negative mg/dL    Urobilinogen Urine Normal Normal, 2.0 mg/dL    Nitrite Urine Negative Negative    Leukocyte Esterase Urine Trace (A) Negative    Bacteria Urine Moderate (A) None Seen /HPF    Mucus Urine Present (A) None Seen /LPF    RBC Urine 2 <=2 /HPF    WBC Urine 8 (H) <=5 /HPF    Squamous Epithelials Urine 9 (H) <=1 /HPF    Hyaline Casts Urine 1 <=2 /LPF    Narrative    Urine Culture not indicated       Assessments & Plan (with Medical Decision Making)   Assessment  Summary and Clinical Impression: 22-year-old female who presented with pelvic discomfort and abdominal pain after IUD placement days earlier.  She arrived afebrile and hemodynamically normal.  She reported no abnormal vaginal discharge bleeding or spotting.  She was concerned that she was having abdominal crampi was openng to consider having IUD removal.  We discussed ultrasound imaging to ensure there is no sequela after placement and IUD positioning.  Ultrasound revealed IUD in normal position without complications postplacement.  Patient was reassured by her work-up including urinalysis and her pain was well managed.  She elected to keep her IUD in place and I recommended if she desires to have it removed we can swelling be removed by her primary care provider or in gynecology clinic if desired.  She requested additional pain management plan and was given 3 doses of oral morphine and we reviewed dosing for Tylenol ibuprofen for home.    ED cours and plan:  Reviewed the medical record.  Reviewed phone conversation on 510 and prior to ED arrival reviewed office visit on 5/9/2023.  Patient had  testing on 5/9/2023 including negative chlamydia.  She was offered therapies to manage her discomfort is awaiting pelvic ultrasound scan obtained to evaluate for IUD malposition observe acute  process.  Patient has a normal white count today hemoglobin is 11.8. Ultrasound revealed IUD without malpositioning.  See further details outlined in the radiology report.  After period of care with therapies given during her ED course she reported marked improvement in her.  Discomfort.  She was reassured by her evaluation requested additional pain management for home.  She was given morphine x3 tablets and advised to use Tylenol 1000 mg every 8 hours, Motrin ibuprofen with food for 100 mg every 12 hours as needed.  We discussed if she elects to have her IUD removed a concern to be removed by her primary care provider or gynecology  if she changes her mind.  We discussed that if she develops a fever or chills or new concerns she should return to be reevaluated      Disclaimer: This note consists of symbols derived from keyboarding, dictation and/or voice recognition software. As a result, there may be errors in the script that have gone undetected. Please consider this when interpreting information found in this chart.  I have reviewed the nursing notes.    I have reviewed the findings, diagnosis, plan and need for follow up with the patient.           Medical Decision Making  The patient's presentation was of moderate complexity (an acute illness with systemic symptoms).    The patient's evaluation involved:  ordering and/or review of 2 test(s) in this encounter (diagnostic imaging)    The patient's management necessitated moderate risk (prescription drug management including medications given in the ED).        Discharge Medication List as of 5/13/2023  3:10 PM      START taking these medications    Details   morphine 10 MG/5ML solution Take 5 mLs (10 mg) by mouth every 6 hours as needed for pain, Disp-15 mL, R-0, E-Prescribe             Final diagnoses:   Abdominal pain, generalized - After IUD placement 4 days prior.  Normal position on ultrasound       5/13/2023   [facility] EMERGENCY DEPT     Pablo Berg MD  05/13/23 1539

## 2023-05-15 ENCOUNTER — NURSE TRIAGE (OUTPATIENT)
Dept: NURSING | Facility: CLINIC | Age: 23
End: 2023-05-15
Payer: COMMERCIAL

## 2023-05-15 NOTE — TELEPHONE ENCOUNTER
"Nurse Triage SBAR    Is this a 2nd Level Triage? YES, LICENSED PRACTITIONER REVIEW IS REQUIRED    Situation:   Persistent abdominal/pelvic pain correlating w/IUD placement.  IUD placed 5/9/23.  Onset of \"extreme pain and cramping on same evening (5/9/23) and ongoing.\"    Background:   Pt had ED eval 5/13/23  for abdominal pain correlating with IUD placement.  Findings per chart notes:  IMPRESSION:  1.  IUD appears appropriately position.  2.  No acute process demonstrated.  Pt was given morphine solution for home use at discharge.    Assessment:   Continuous pain currently rated 6/10.  Additional intermittent \"sharp pains at times.\"  Pain located in lower abdomen and cervix area.  Last dose morphine taken twelve hours ago.  States \"Waiting to eat breakfast before taking last remaining dose.\"  No vaginal bleeding.    Protocol Recommended Disposition:   Go To Office Now  Central scheduling not able to schedule for Dr Salmeron.  Routed to provider for advice ...  Can Dr Salmeron see pt in clinic today ?    Does the patient meet one of the following criteria for ADS visit consideration? 16+ years old, with an [facility] PCP -> Yes    TIP  Providers, please consider if this condition is appropriate for management at one of our Acute and Diagnostic Services sites.     If patient is a good candidate, please use dotphrase <dot>triageresponse and select Refer to ADS to document.    Pt's callback # -> [phone]     Thank you-    [facility] Nurse Advisor     Reason for Disposition    Constant abdominal pain and present > 2 hours    Additional Information    Negative: Shock suspected (e.g., cold/pale/clammy skin, too weak to stand, low BP, rapid pulse)    Negative: Sounds like a life-threatening emergency to the triager    Negative: Abdominal pain and pregnant < 20 weeks    Negative: Vaginal bleeding and pregnant < 20 weeks    Negative: Vaginal discharge is main symptom    Negative: SEVERE abdominal pain (e.g., " excruciating) and present > 1 hour    Negative: SEVERE vaginal bleeding (e.g., soaking 2 pads or tampons per hour and present 2 or more hours; 1 menstrual cup every 2 hours)    Negative: SEVERE dizziness (e.g., unable to stand, requires support to walk, feels like passing out now)    Negative: Patient sounds very sick or weak to the triager    Negative: MODERATE vaginal bleeding (e.g., soaking 1 pad or tampon per hour and present > 6 hours; 1 menstrual cup every 6 hours)    Negative: Vaginal bleeding is WORSE than normal (e.g., heavier) and dizziness or lightheadedness    Protocols used: CONTRACEPTION - IUD SYMPTOMS AND SRSSPCAAR-Y-VT

## 2023-05-15 NOTE — TELEPHONE ENCOUNTER
"S-(situation): severe abdominal pain and cramping     B-(background): contraception consult 5/9/2023  IUD placed by Dr. Faviola Singh removed.     ER visit 5/13/2023    Ketorolac and Dilaudid given.  Patient sent home with 3 doses of PO Morphine.  US showed properly placed IUD    A-(assessment): abdominal pain and cramping continues.   Patient rating pain a 8/10.    Patient asking \" Does Dr. Salmeron think this will get better with time or shold I have it removed?\"    R-(recommendations): Reassurance provided. Continue to monitor. Reviewed comfort measures after Morphine is gone. Patient advised Dr. Salmeron is in the OR today, not available in clinic. Patient asking message to be routed to him for his opinion.  Offered other provider for consult today, patient prefers to wait for Dr. Salmeron.    Please review and advise.  Thank you.    Ivanna PANDYA   [facility]       "

## 2023-05-16 NOTE — TELEPHONE ENCOUNTER
Huddled with Dr. Salmeron   Recommended likely removal.  Patient to come to clinic for further discussion.    Attempted to reach patient.  Unable to reach.  Left message for patient to return call to clinic.    Ivanna PANDYA   [facility]

## 2023-05-16 NOTE — TELEPHONE ENCOUNTER
Patient called back.  Patient feels she is doing better today.  Patient reports pain is improving.  Would like to see Dr. Salmeron is clinic this week in the afternoon.  Appointment scheduled.    Ivanna PANDYA   [facility]

## 2023-05-22 DIAGNOSIS — T88.7XXA SIDE EFFECT OF MEDICATION: ICD-10-CM

## 2023-05-22 DIAGNOSIS — B37.31 YEAST INFECTION OF THE VAGINA: ICD-10-CM

## 2023-05-22 RX ORDER — FLUCONAZOLE 150 MG/1
150 TABLET ORAL
Qty: 3 TABLET | Refills: 0 | Status: SHIPPED | OUTPATIENT
Start: 2023-05-22 | End: 2023-08-02

## 2023-06-06 NOTE — TELEPHONE ENCOUNTER
Central Prior Authorization Team   Phone: [phone]      PA Initiation    Medication: QULIPTA 60 MG PO TABS  Insurance Company: [employer] - Phone [phone] Fax [phone]  Pharmacy Filling the Rx: [facility] - [place], [place] - [address] [place] [address]  Filling Pharmacy Phone: [phone]  Filling Pharmacy Fax:    Start Date: 6/6/2023

## 2023-06-06 NOTE — TELEPHONE ENCOUNTER
Rec'd a fax from insurance requesting additional information. I have completed and faxed back.   CASE# [id number]

## 2023-06-07 NOTE — TELEPHONE ENCOUNTER
Prior Authorization Approval    Medication: QULIPTA 60 MG PO TABS  Authorization Effective Date: 5/8/2023  Authorization Expiration Date: 6/6/2024  Approved Dose/Quantity: 30 per 30 days  Reference #: [id number]   Insurance Company: [employer] - Phone [phone] Fax [phone]  Expected CoPay:       CoPay Card Available:      Financial Assistance Needed:   Which Pharmacy is filling the prescription: [facility] - [place], [place] - [address] [place] [address]  Pharmacy Notified: Yes  Patient Notified: No

## 2023-06-12 DIAGNOSIS — G47.00 PERSISTENT INSOMNIA: ICD-10-CM

## 2023-06-13 ENCOUNTER — OFFICE VISIT (OUTPATIENT)
Dept: OBGYN | Facility: CLINIC | Age: 23
End: 2023-06-13
Payer: COMMERCIAL

## 2023-06-13 VITALS
HEART RATE: 93 BPM | RESPIRATION RATE: 16 BRPM | HEIGHT: 65 IN | DIASTOLIC BLOOD PRESSURE: 70 MMHG | SYSTOLIC BLOOD PRESSURE: 112 MMHG | TEMPERATURE: 98.9 F | BODY MASS INDEX: 41.22 KG/M2 | WEIGHT: 247.4 LBS

## 2023-06-13 DIAGNOSIS — R10.2 PELVIC PAIN IN FEMALE: ICD-10-CM

## 2023-06-13 DIAGNOSIS — Z97.5 PRESENCE OF INTRAUTERINE CONTRACEPTIVE DEVICE (IUD): ICD-10-CM

## 2023-06-13 DIAGNOSIS — Z30.011 ENCOUNTER FOR INITIAL PRESCRIPTION OF CONTRACEPTIVE PILLS: Primary | ICD-10-CM

## 2023-06-13 PROCEDURE — 99213 OFFICE O/P EST LOW 20 MIN: CPT | Mod: 25 | Performed by: OBSTETRICS & GYNECOLOGY

## 2023-06-13 PROCEDURE — 58301 REMOVE INTRAUTERINE DEVICE: CPT | Performed by: OBSTETRICS & GYNECOLOGY

## 2023-06-13 RX ORDER — IBUPROFEN 200 MG
800 TABLET ORAL DAILY
COMMUNITY
End: 2023-08-30

## 2023-06-13 RX ORDER — NORGESTIMATE AND ETHINYL ESTRADIOL 7DAYSX3 LO
1 KIT ORAL DAILY
Qty: 84 TABLET | Refills: 3 | Status: SHIPPED | OUTPATIENT
Start: 2023-06-13 | End: 2024-04-04

## 2023-06-13 NOTE — PROGRESS NOTES
"CC:  Follow up  from ED for pelvic pain after placement of her IUD in May 2023  HPI:  Mary Bueno is a 22 year old female is a   P0.  Patient's last menstrual period was 04/01/2023 (approximate).  Menses are spotty,since the placement of the IUD in May.  She has pain intermittently on the right side that brings her to her knees.  She was seen in the ED and an ULTRASOUND performed that showed the IUD in proper position.  She was told top \"Wait it out\" but with her daily episodes, I recommended that she have the IUD Removed.  She has a hx of heavy periods and migraines.  She was placed on a OCP @ 15 years old for cycle control.  She started a new migraine medication yesterday.    Patients records are available and reviewed at today's visit.    Past GYN history:  No STD history       Last PAP smear:  Not yet indicated  Last TSH:   TSH   Date Value Ref Range Status   02/09/2023 1.47 0.30 - 4.20 uIU/mL Final   09/22/2020 1.20 0.40 - 4.00 mU/L Final      , normal?  Yes    Past Medical History:   Diagnosis Date     Migraines      Uncomplicated asthma        Past Surgical History:   Procedure Laterality Date     ARTHROSCOPY ANKLE Right 1/26/2017    Procedure: ARTHROSCOPY ANKLE;  Surgeon: Davis Mayfield DPM;  Location: [facility]     DILATION AND CURETTAGE, HYSTEROSCOPY DIAGNOSTIC, COMBINED N/A 7/19/2021    Procedure: HYSTEROSCOPY, DIAGNOSTIC, WITH DILATION AND CURETTAGE OF UTERUS;  Surgeon: Ryan Salmeron MD;  Location: [facility] OR     NO HISTORY OF SURGERY       TONSILLECTOMY, ADENOIDECTOMY, COMBINED Bilateral 5/13/2022    Procedure: TONSILLECTOMY  and ADENOIDECTOMY;  Surgeon: Zach Cross MD;  Location: [facility] OR       Family History   Problem Relation Age of Onset     Migraines Mother      Other - See Comments Father 31        MVA     Migraines Maternal Grandmother      Migraines Maternal Aunt      Coronary Artery Disease No family hx of      Other Cancer No family hx of        Allergies: Patient has no " known allergies.    Current Outpatient Medications   Medication Sig Dispense Refill     Atogepant (QULIPTA) 60 MG TABS Take 60 mg by mouth every morning 30 tablet 11     Cholecalciferol (VITAMIN D3) 50 MCG (2000 UT) CAPS Take 1 capsule by mouth daily 90 capsule 3     clindamycin-benzoyl peroxide (BENZACLIN) 1-5 % external gel Apply topically daily To face, legs, underarms and buttocks once daily 100 g 3     clotrimazole (LOTRIMIN) 1 % external cream Apply topically 2 times daily To perineum as needed 15 g 1     DULoxetine (CYMBALTA) 20 MG capsule Take 1 cap for2 weeks then increase to 2 caps in the am 60 capsule 3     fluconazole (DIFLUCAN) 150 MG tablet Take 1 tablet (150 mg) by mouth every 72 hours 3 tablet 0     hydrOXYzine (ATARAX) 25 MG tablet Take 1-2 tablets (25-50 mg) by mouth every 6 hours as needed for anxiety 60 tablet 1     ibuprofen (ADVIL/MOTRIN) 200 MG tablet Take 800 mg by mouth daily       levonorgestrel (KYLEENA) 19.5 MG IUD 1 each (19.5 mg) by Intrauterine route once       morphine 10 MG/5ML solution Take 5 mLs (10 mg) by mouth every 6 hours as needed for pain 15 mL 0     norgestim-eth estrad triphasic (ORTHO TRI-CYCLEN LO) 0.18/0.215/0.25 MG-25 MCG tablet Take 1 tablet by mouth daily 84 tablet 3     ondansetron (ZOFRAN ODT) 8 MG ODT tab        pantoprazole (PROTONIX) 40 MG EC tablet Take 1 tablet (40 mg) by mouth daily 90 tablet 3     promethazine (PHENERGAN) 25 MG tablet Take 1 tablet (25 mg) by mouth every 8 hours as needed for nausea 60 tablet 1     traZODone (DESYREL) 50 MG tablet Try the trazodone Start with 1/2 tablet an hour before bedtime. You can increase by 1/2 tablet every few nights to a maximum of 3 tablets. If you are groggy the next day after changing doses try cutting back to the dose you were taking before. Let me know if you need to try something else or if you are having side effects from this. (Patient taking differently: Try the trazodone Start with 1/2 tablet an hour before  "bedtime. You can increase by 1/2 tablet every few nights to a maximum of 3 tablets. If you are groggy the next day after changing doses try cutting back to the dose you were taking before. Let me know if you need to try something else or if you are having side effects from this.  Taking 3 tablets at night currently) 90 tablet 3     valACYclovir (VALTREX) 1000 mg tablet Take 1 tablet (1,000 mg) by mouth 2 times daily For 1 day at onset cold sore 8 tablet 3     valACYclovir (VALTREX) 500 MG tablet Take 1 tablet (500 mg) by mouth daily 90 tablet 3       ROS:  C: NEGATIVE for fever, chills, change in weight  I: NEGATIVE for worrisome rashes, moles or lesions  E: NEGATIVE for vision changes or irritation  E/M: NEGATIVE for ear, mouth and throat problems  R: NEGATIVE for significant cough or SOB  CV: NEGATIVE for chest pain, palpitations or peripheral edema  GI: NEGATIVE for nausea, abdominal pain, heartburn, or change in bowel habits  : NEGATIVE for frequency, dysuria, hematuria, vaginal discharge  M: NEGATIVE for significant arthralgias or myalgia  N: NEGATIVE for weakness, dizziness or paresthesias  E: NEGATIVE for temperature intolerance, skin/hair changes  P: NEGATIVE for changes in mood or affect    EXAM:  Blood pressure 112/70, pulse 93, temperature 98.9  F (37.2  C), resp. rate 16, height 1.651 m (5' 5\"), weight 112.2 kg (247 lb 6.4 oz), last menstrual period 04/01/2023, not currently breastfeeding.   BMI= Body mass index is 41.17 kg/m .  General - pleasant female in no acute distress.  Pelvic - EG: normal adult female,   BUS: within normal limits,   Vagina: well rugated, dark bloody  discharge,   Cervix: no lesions or CMT, Strings visible   Uterus: firm, normal sized and nontender, Adnexae: no masses or tenderness.  Rectovaginal - deferred.  Musculoskeletal - no gross deformities.  Neurological - normal strength, sensation, and mental status.    Procedure:  After  consent was obtained from the " patient,  Speculum was placed in the vagina the cervix was isolated.  The IUD strings were readily visible.  The IUD strings were grasped with ring forcep and IUD easily removed intact with minimal patient discomfort noted.  No bleeding noted.       ASSESSMENT/PLAN:  (Z30.011) Encounter for initial prescription of contraceptive pills  (primary encounter diagnosis)  Comment: cycle and birth control  Plan: norgestim-eth estrad triphasic (ORTHO         TRI-CYCLEN LO) 0.18/0.215/0.25 MG-25 MCG tablet      (Z97.5) Presence of intrauterine contraceptive device (IUD)  Comment: (R10.2) Pelvic pain in female  Plan: REMOVE INTRAUTERINE DEVice     Letter will be sent to the referring provider.    Ryan Salmeron MD

## 2023-06-14 RX ORDER — TRAZODONE HYDROCHLORIDE 50 MG/1
TABLET, FILM COATED ORAL
Qty: 90 TABLET | Refills: 4 | Status: SHIPPED | OUTPATIENT
Start: 2023-06-14 | End: 2023-09-27 | Stop reason: DRUGHIGH

## 2023-06-27 ENCOUNTER — MYC MEDICAL ADVICE (OUTPATIENT)
Dept: NEUROLOGY | Facility: CLINIC | Age: 23
End: 2023-06-27
Payer: COMMERCIAL

## 2023-06-29 ENCOUNTER — MYC MEDICAL ADVICE (OUTPATIENT)
Dept: FAMILY MEDICINE | Facility: CLINIC | Age: 23
End: 2023-06-29

## 2023-06-29 ENCOUNTER — HOSPITAL ENCOUNTER (EMERGENCY)
Facility: CLINIC | Age: 23
Discharge: HOME | End: 2023-06-29
Attending: PHYSICIAN ASSISTANT | Admitting: PHYSICIAN ASSISTANT
Payer: COMMERCIAL

## 2023-06-29 ENCOUNTER — TELEPHONE (OUTPATIENT)
Dept: NEUROLOGY | Facility: CLINIC | Age: 23
End: 2023-06-29
Payer: COMMERCIAL

## 2023-06-29 VITALS
OXYGEN SATURATION: 100 % | RESPIRATION RATE: 18 BRPM | DIASTOLIC BLOOD PRESSURE: 58 MMHG | HEART RATE: 83 BPM | SYSTOLIC BLOOD PRESSURE: 124 MMHG | TEMPERATURE: 99.2 F

## 2023-06-29 DIAGNOSIS — E66.01 MORBID OBESITY (H): Primary | ICD-10-CM

## 2023-06-29 DIAGNOSIS — R13.10 SWALLOWING PROBLEM: ICD-10-CM

## 2023-06-29 LAB — GROUP A STREP BY PCR: NOT DETECTED

## 2023-06-29 PROCEDURE — 87651 STREP A DNA AMP PROBE: CPT | Performed by: PHYSICIAN ASSISTANT

## 2023-06-29 PROCEDURE — 99213 OFFICE O/P EST LOW 20 MIN: CPT | Performed by: PHYSICIAN ASSISTANT

## 2023-06-29 PROCEDURE — G0463 HOSPITAL OUTPT CLINIC VISIT: HCPCS | Performed by: PHYSICIAN ASSISTANT

## 2023-06-29 RX ORDER — PREDNISONE 20 MG/1
TABLET ORAL
Qty: 10 TABLET | Refills: 0 | Status: SHIPPED | OUTPATIENT
Start: 2023-06-29 | End: 2023-08-03

## 2023-06-29 ASSESSMENT — ENCOUNTER SYMPTOMS
COUGH: 0
TROUBLE SWALLOWING: 1
SHORTNESS OF BREATH: 0
FEVER: 0
STRIDOR: 0
CONSTITUTIONAL NEGATIVE: 1
RESPIRATORY NEGATIVE: 1
VOICE CHANGE: 0
SORE THROAT: 1

## 2023-06-29 ASSESSMENT — ACTIVITIES OF DAILY LIVING (ADL): ADLS_ACUITY_SCORE: 37

## 2023-06-29 NOTE — TELEPHONE ENCOUNTER
Called pt and reiterated Dr. Aragon' note:  . I think the more important factor is that you are reporting trouble swallowing. In that setting I strongly suggest you get medically checked out in urgent care to make sure you aren't in danger of having worsening/throat closure. If they feel you are having an allergic reaction to the qulipta I suspect they will give you some medicine to counter act the swelling. Nausea is commonly reported as a side effect but throat swelling isn't and it should be assessed.   Informed pt her symptom of trouble swallowing is serious and concerning and we want to keep her safe and as Dr. Aragon stated she needs to be assessed and evaluated to make sure she is not in any danger of her throat closing and she verbally understood.Informed pt she needs to go to urgent care now and she verbally understood.  Informed her after her urgent care visit  to send us a my chart update and she verbally understood.

## 2023-06-29 NOTE — DISCHARGE INSTRUCTIONS
You are not having signs of anaphylaxis at this time.  Recommend continuing to hold your Qulipta as this is the only new medication you were exposed to before your throat symptoms started.  Could be a possible side-effect/reaction of this mediation although not a common side-effect.  Will provide Prednisone to help with your symptoms.  Follow-up with your neurologist to discuss further treatment of your migraines going forward.    Return to the ER should you develop difficulties swallowing or breathing or any other symptoms of concern.

## 2023-06-29 NOTE — ED PROVIDER NOTES
History     Chief Complaint   Patient presents with     Allergic Reaction     Possible allergic reaction to new medication. It feels like there's something permanently stuck in patient's throat.      ÁNGEL Bueno is a 22 year old female who presents with complaints of possible allergic reaction.  Patient states she was started the medication Qulipta about 2 weeks ago for her migraines.  She states about 4 to 5 days after starting the medication, she started to feel like she was having difficulty swallowing and states it has been painful to swallow.  Patient contacted her neurologist who recommended she be further evaluated.  Patient denies associated rash or hives.  No facial swelling.  Patient is able to swallow and handle her secretions but states it feels difficult to swallow.  Denies fevers, chills, neck pain/stiffness, rash, cough, nasal congestion, nausea, vomiting, diarrhea, abdominal pain, chest pain, or shortness of breath.  Patient states she has had her tonsils removed and the symptoms do not feel consistent with strep throat.  Patient denies any known symptoms.  Patient states she did not take her dose of Qulipta this morning.      Allergies:  No Known Allergies    Problem List:    Patient Active Problem List    Diagnosis Date Noted     Encounter for intrauterine device placement 05/09/2023     Priority: Medium     Kyleena IUD insertion   NDC: 91209-835-06  Lot: [id number]  Exp: 8/2024       BMI 40.0-44.9, adult (H) 02/09/2023     Priority: Medium     Breakthrough bleeding on Nexplanon 06/22/2021     Priority: Medium     Added automatically from request for surgery [id number]       Endometrial thickening on ultrasound 06/22/2021     Priority: Medium     Added automatically from request for surgery [id number]       Nexplanon insertion 09/17/2020     Priority: Medium     Lot: [id number]  Exp: 10/2023    KELLEY Barnes  9/2020       Chronic migraine without aura without status migrainosus, not  intractable 05/22/2019     Priority: Medium     Increased insulin level 05/25/2017     Priority: Medium     Current mild episode of major depressive disorder without prior episode (H) 07/11/2016     Priority: Medium     Gastroesophageal reflux disease, esophagitis presence not specified 07/11/2016     Priority: Medium     IMO Regulatory Load OCT 2020       Depression with anxiety 07/02/2016     Priority: Medium     Obesity 05/14/2014     Priority: Medium     Adjustment disorder with mixed anxiety and depressed mood 09/20/2013     Priority: Medium     Weight disorder 06/18/2012     Priority: Medium     Recurrent UTI 09/16/2011     Priority: Medium     Referred for peds uro and u/s   - normal retroperitoneal u/s   - pending [facility] - mom hasn't yet scheduled ( 12/6/11)       Health Care Home 05/06/2013     Priority: Low     *See Letters for [facility] Care Plan: My Access Plan              Past Medical History:    Past Medical History:   Diagnosis Date     Migraines      Uncomplicated asthma        Past Surgical History:    Past Surgical History:   Procedure Laterality Date     ARTHROSCOPY ANKLE Right 1/26/2017    Procedure: ARTHROSCOPY ANKLE;  Surgeon: Davis Mayfield DPM;  Location: [facility]     DILATION AND CURETTAGE, HYSTEROSCOPY DIAGNOSTIC, COMBINED N/A 7/19/2021    Procedure: HYSTEROSCOPY, DIAGNOSTIC, WITH DILATION AND CURETTAGE OF UTERUS;  Surgeon: Ryan Salmeron MD;  Location: [facility]     NO HISTORY OF SURGERY       TONSILLECTOMY, ADENOIDECTOMY, COMBINED Bilateral 5/13/2022    Procedure: TONSILLECTOMY  and ADENOIDECTOMY;  Surgeon: Zach Cross MD;  Location: [facility]       Family History:    Family History   Problem Relation Age of Onset     Migraines Mother      Other - See Comments Father 31        MVA     Migraines Maternal Grandmother      Migraines Maternal Aunt      Coronary Artery Disease No family hx of      Other Cancer No family hx of        Social History:  Marital Status:  Single [1]  Social  History     Tobacco Use     Smoking status: Never     Smokeless tobacco: Never   Vaping Use     Vaping Use: Some days     Substances: Nicotine   Substance Use Topics     Alcohol use: No     Alcohol/week: 0.0 standard drinks of alcohol     Drug use: No        Medications:    Atogepant (QULIPTA) 60 MG TABS  Cholecalciferol (VITAMIN D3) 50 MCG (2000 UT) CAPS  clindamycin-benzoyl peroxide (BENZACLIN) 1-5 % external gel  clotrimazole (LOTRIMIN) 1 % external cream  DULoxetine (CYMBALTA) 20 MG capsule  fluconazole (DIFLUCAN) 150 MG tablet  hydrOXYzine (ATARAX) 25 MG tablet  ibuprofen (ADVIL/MOTRIN) 200 MG tablet  levonorgestrel (KYLEENA) 19.5 MG IUD  morphine 10 MG/5ML solution  norgestim-eth estrad triphasic (ORTHO TRI-CYCLEN LO) 0.18/0.215/0.25 MG-25 MCG tablet  ondansetron (ZOFRAN ODT) 8 MG ODT tab  pantoprazole (PROTONIX) 40 MG EC tablet  predniSONE (DELTASONE) 20 MG tablet  promethazine (PHENERGAN) 25 MG tablet  traZODone (DESYREL) 50 MG tablet  valACYclovir (VALTREX) 1000 mg tablet  valACYclovir (VALTREX) 500 MG tablet          Review of Systems   Constitutional: Negative.  Negative for fever.   HENT: Positive for sore throat and trouble swallowing. Negative for voice change.    Respiratory: Negative.  Negative for cough, shortness of breath and stridor.    Skin: Negative.  Negative for rash.   All other systems reviewed and are negative.      Physical Exam   BP: 124/58  Pulse: 83  Temp: 99.2  F (37.3  C)  Resp: 18  SpO2: 100 %      Physical Exam  Constitutional:       General: She is not in acute distress.     Appearance: Normal appearance. She is well-developed. She is not ill-appearing, toxic-appearing or diaphoretic.   HENT:      Head: Normocephalic and atraumatic.      Right Ear: Tympanic membrane, ear canal and external ear normal.      Left Ear: Tympanic membrane, ear canal and external ear normal.      Nose: Nose normal. No congestion or rhinorrhea.      Mouth/Throat:      Lips: Pink.      Mouth: Mucous  membranes are moist.      Pharynx: Oropharynx is clear. Uvula midline. No pharyngeal swelling, oropharyngeal exudate, posterior oropharyngeal erythema or uvula swelling.      Tonsils: No tonsillar exudate or tonsillar abscesses.      Comments: Swallowing without difficulties.  Handling secretions without problems.  Eyes:      Extraocular Movements: Extraocular movements intact.      Conjunctiva/sclera: Conjunctivae normal.      Pupils: Pupils are equal, round, and reactive to light.   Neck:      Trachea: Phonation normal.   Cardiovascular:      Rate and Rhythm: Normal rate and regular rhythm.      Heart sounds: Normal heart sounds.   Pulmonary:      Effort: Pulmonary effort is normal. No respiratory distress.      Breath sounds: Normal breath sounds. No stridor. No wheezing, rhonchi or rales.   Musculoskeletal:         General: Normal range of motion.      Cervical back: Normal range of motion and neck supple. No rigidity or tenderness. Normal range of motion.   Lymphadenopathy:      Cervical: No cervical adenopathy.   Skin:     General: Skin is warm and dry.      Findings: No rash.   Neurological:      Mental Status: She is alert and oriented to person, place, and time.   Psychiatric:         Behavior: Behavior is cooperative.         ED Course                 Procedures    Results for orders placed or performed during the hospital encounter of 06/29/23 (from the past 24 hour(s))   Group A Streptococcus PCR Throat Swab    Specimen: Throat; Swab   Result Value Ref Range    Group A strep by PCR Not Detected Not Detected    Narrative    The Xpert Xpress Strep A test, performed on the [employer], is a rapid, qualitative in vitro diagnostic test for the detection of Streptococcus pyogenes (Group A ß-hemolytic Streptococcus, Strep A) in throat swab specimens from patients with signs and symptoms of pharyngitis. The Xpert Xpress Strep A test can be used as an aid in the diagnosis of Group A  Streptococcal pharyngitis. The assay is not intended to monitor treatment for Group A Streptococcus infections. The Xpert Xpress Strep A test utilizes an automated real-time polymerase chain reaction (PCR) to detect Streptococcus pyogenes DNA.       Medications - No data to display    Assessments & Plan (with Medical Decision Making)     Pt is a 22 year old female who presents with complaints of possible allergic reaction.  Patient states she was started the medication Qulipta about 2 weeks ago for her migraines.  She states about 4 to 5 days after starting the medication, she started to feel like she was having difficulty swallowing and states it has been painful to swallow.  Patient contacted her neurologist who recommended she be further evaluated.  Patient denies associated rash or hives.  No facial swelling.  Patient is able to swallow and handle her secretions but states it feels difficult to swallow.      Pt is afebrile on arrival.  Exam as above.  Strep testing was negative.  No oropharyngeal or facial swelling on exam.  No hives or respiratory distress.  No evidence of anaphylaxis.  Strep testing was negative.  Discussed results with patient.  Encouraged symptomatic treatments at home.  Recommend holding her Qulipta as this is the only new medication that she has been exposed to before her throat symptoms started.  We discussed that this may be a possible side effect/reaction of the medication although may be unrelated.  Will prescribe Prednisone to help with symptoms.  Recommend follow-up with neurologist to discuss further treatment of her migraines going forward.  Return precautions were reviewed.  Hand-outs were provided.    Patient was sent with Prednisone and was instructed to follow-up with PCP for continued care and management.  he is to return to the ED for persistent and/or worsening symptoms.  Patient expressed understanding of the diagnosis and plan and was discharged home in good  condition.    I have reviewed the nursing notes.    I have reviewed the findings, diagnosis, plan and need for follow up with the patient.    Discharge Medication List as of 6/29/2023  5:13 PM      START taking these medications    Details   predniSONE (DELTASONE) 20 MG tablet Take two tablets (= 40mg) each day for 5 (five) days, Disp-10 tablet, R-0, E-Prescribe             Final diagnoses:   Swallowing problem       6/29/2023   [facility] EMERGENCY DEPT      Disclaimer:  This note consists of symbols derived from keyboarding, dictation and/or voice recognition software.  As a result, there may be errors in the script that have gone undetected.  Please consider this when interpreting information found in this chart.     Destiny Garrido PA-C  06/29/23 1929       Destiny Garrido PA-C  06/29/23 1929

## 2023-07-10 DIAGNOSIS — L70.8 OTHER ACNE: ICD-10-CM

## 2023-07-11 NOTE — TELEPHONE ENCOUNTER
"Routing refill request to provider for review/approval because:  PCP to determine      Requested Prescriptions   Pending Prescriptions Disp Refills     clindamycin-benzoyl peroxide (BENZACLIN) 1-5 % external gel [Pharmacy Med Name: clindamycin 1 %-benzoyl peroxide 5 % topical gel] 100 g 3     Sig: Apply topically daily To face, legs, underarms and buttocks once daily       Topical Acne Medications Protocol Passed - 7/10/2023  6:43 AM        Passed - Patient is 12 years of age or older        Passed - Recent (12 mo) or future (30 days) visit within the authorizing provider's specialty     Patient has had an office visit with the authorizing provider or a provider within the authorizing providers department within the previous 12 mos or has a future within next 30 days. See \"Patient Info\" tab in inbasket, or \"Choose Columns\" in Meds & Orders section of the refill encounter.              Passed - Medication is active on med list                 Ingris Mark RN 07/11/23 2:23 PM    "

## 2023-07-13 RX ORDER — CLINDAMYCIN AND BENZOYL PEROXIDE 10; 50 MG/G; MG/G
GEL TOPICAL DAILY
Qty: 100 G | Refills: 3 | Status: SHIPPED | OUTPATIENT
Start: 2023-07-13 | End: 2023-11-13

## 2023-07-27 ENCOUNTER — OFFICE VISIT (OUTPATIENT)
Dept: FAMILY MEDICINE | Facility: CLINIC | Age: 23
End: 2023-07-27
Payer: COMMERCIAL

## 2023-07-27 VITALS
RESPIRATION RATE: 18 BRPM | SYSTOLIC BLOOD PRESSURE: 110 MMHG | TEMPERATURE: 97.9 F | BODY MASS INDEX: 41.6 KG/M2 | OXYGEN SATURATION: 99 % | HEART RATE: 86 BPM | DIASTOLIC BLOOD PRESSURE: 74 MMHG | WEIGHT: 250 LBS

## 2023-07-27 DIAGNOSIS — N92.6 IRREGULAR MENSES: ICD-10-CM

## 2023-07-27 DIAGNOSIS — R39.9 URINARY TRACT INFECTION SYMPTOMS: Primary | ICD-10-CM

## 2023-07-27 DIAGNOSIS — F43.23 ADJUSTMENT DISORDER WITH MIXED ANXIETY AND DEPRESSED MOOD: ICD-10-CM

## 2023-07-27 DIAGNOSIS — E66.813 CLASS 3 OBESITY: ICD-10-CM

## 2023-07-27 DIAGNOSIS — G43.009 MIGRAINE WITHOUT AURA AND WITHOUT STATUS MIGRAINOSUS, NOT INTRACTABLE: ICD-10-CM

## 2023-07-27 DIAGNOSIS — R09.A2 GLOBUS SENSATION: ICD-10-CM

## 2023-07-27 LAB
ALBUMIN UR-MCNC: NEGATIVE MG/DL
APPEARANCE UR: CLEAR
BACTERIA #/AREA URNS HPF: ABNORMAL /HPF
BILIRUB UR QL STRIP: NEGATIVE
CLUE CELLS: ABNORMAL
COLOR UR AUTO: YELLOW
GLUCOSE UR STRIP-MCNC: NEGATIVE MG/DL
HCG UR QL: NEGATIVE
HGB UR QL STRIP: ABNORMAL
KETONES UR STRIP-MCNC: NEGATIVE MG/DL
LEUKOCYTE ESTERASE UR QL STRIP: NEGATIVE
MUCOUS THREADS #/AREA URNS LPF: PRESENT /LPF
NITRATE UR QL: NEGATIVE
PH UR STRIP: 5.5 [PH] (ref 5–7)
RBC #/AREA URNS AUTO: ABNORMAL /HPF
SP GR UR STRIP: >=1.03 (ref 1–1.03)
SQUAMOUS #/AREA URNS AUTO: ABNORMAL /LPF
TRANS CELLS #/AREA URNS HPF: ABNORMAL /HPF
TRICHOMONAS, WET PREP: ABNORMAL
UROBILINOGEN UR STRIP-ACNC: 0.2 E.U./DL
WBC #/AREA URNS AUTO: ABNORMAL /HPF
WBC CLUMPS #/AREA URNS HPF: PRESENT /HPF
WBC'S/HIGH POWER FIELD, WET PREP: ABNORMAL
YEAST, WET PREP: ABNORMAL

## 2023-07-27 PROCEDURE — 99214 OFFICE O/P EST MOD 30 MIN: CPT | Performed by: FAMILY MEDICINE

## 2023-07-27 PROCEDURE — 81001 URINALYSIS AUTO W/SCOPE: CPT | Performed by: FAMILY MEDICINE

## 2023-07-27 PROCEDURE — 87210 SMEAR WET MOUNT SALINE/INK: CPT | Performed by: FAMILY MEDICINE

## 2023-07-27 PROCEDURE — 81025 URINE PREGNANCY TEST: CPT | Performed by: FAMILY MEDICINE

## 2023-07-27 RX ORDER — TOPIRAMATE 25 MG/1
TABLET, FILM COATED ORAL
Qty: 14 TABLET | Refills: 0 | Status: SHIPPED | OUTPATIENT
Start: 2023-07-27 | End: 2023-09-27 | Stop reason: DRUGHIGH

## 2023-07-27 RX ORDER — CLOTRIMAZOLE 1 %
CREAM (GRAM) TOPICAL 2 TIMES DAILY
Qty: 30 G | Refills: 1 | Status: SHIPPED | OUTPATIENT
Start: 2023-07-27

## 2023-07-27 RX ORDER — TOPIRAMATE 50 MG/1
50 TABLET, FILM COATED ORAL DAILY
Qty: 50 TABLET | Refills: 1 | Status: SHIPPED | OUTPATIENT
Start: 2023-07-27 | End: 2023-10-04

## 2023-07-27 RX ORDER — MONTELUKAST SODIUM 10 MG/1
10 TABLET ORAL AT BEDTIME
Qty: 30 TABLET | Refills: 3 | Status: SHIPPED | OUTPATIENT
Start: 2023-07-27 | End: 2024-04-04

## 2023-07-27 RX ORDER — HYDROMORPHONE HYDROCHLORIDE 2 MG/1
2 TABLET ORAL EVERY 6 HOURS PRN
Qty: 10 TABLET | Refills: 0 | Status: SHIPPED | OUTPATIENT
Start: 2023-07-27 | End: 2023-07-30

## 2023-07-27 RX ORDER — PHENTERMINE HYDROCHLORIDE 15 MG/1
15 CAPSULE ORAL EVERY MORNING
Qty: 15 CAPSULE | Refills: 0 | Status: SHIPPED | OUTPATIENT
Start: 2023-07-27 | End: 2023-08-31

## 2023-07-27 RX ORDER — PHENTERMINE HYDROCHLORIDE 30 MG/1
30 CAPSULE ORAL EVERY MORNING
Qty: 30 CAPSULE | Refills: 1 | Status: SHIPPED | OUTPATIENT
Start: 2023-07-27 | End: 2023-09-27 | Stop reason: SINTOL

## 2023-07-27 RX ORDER — DULOXETIN HYDROCHLORIDE 20 MG/1
CAPSULE, DELAYED RELEASE ORAL
Qty: 60 CAPSULE | Refills: 3 | Status: SHIPPED | OUTPATIENT
Start: 2023-07-27 | End: 2024-04-04

## 2023-07-27 ASSESSMENT — PATIENT HEALTH QUESTIONNAIRE - PHQ9
SUM OF ALL RESPONSES TO PHQ QUESTIONS 1-9: 4
SUM OF ALL RESPONSES TO PHQ QUESTIONS 1-9: 4
10. IF YOU CHECKED OFF ANY PROBLEMS, HOW DIFFICULT HAVE THESE PROBLEMS MADE IT FOR YOU TO DO YOUR WORK, TAKE CARE OF THINGS AT HOME, OR GET ALONG WITH OTHER PEOPLE: SOMEWHAT DIFFICULT

## 2023-07-27 ASSESSMENT — ANXIETY QUESTIONNAIRES
7. FEELING AFRAID AS IF SOMETHING AWFUL MIGHT HAPPEN: NOT AT ALL
5. BEING SO RESTLESS THAT IT IS HARD TO SIT STILL: NOT AT ALL
GAD7 TOTAL SCORE: 2
IF YOU CHECKED OFF ANY PROBLEMS ON THIS QUESTIONNAIRE, HOW DIFFICULT HAVE THESE PROBLEMS MADE IT FOR YOU TO DO YOUR WORK, TAKE CARE OF THINGS AT HOME, OR GET ALONG WITH OTHER PEOPLE: NOT DIFFICULT AT ALL
6. BECOMING EASILY ANNOYED OR IRRITABLE: SEVERAL DAYS
GAD7 TOTAL SCORE: 2
4. TROUBLE RELAXING: NOT AT ALL
3. WORRYING TOO MUCH ABOUT DIFFERENT THINGS: NOT AT ALL
2. NOT BEING ABLE TO STOP OR CONTROL WORRYING: NOT AT ALL
1. FEELING NERVOUS, ANXIOUS, OR ON EDGE: SEVERAL DAYS

## 2023-07-27 NOTE — PATIENT INSTRUCTIONS
Start the topirimate tonight and the phentermine in 4 days   Then start the allergy pill in 7 days

## 2023-07-27 NOTE — PROGRESS NOTES
"  Assessment & Plan     Adjustment disorder with mixed anxiety and depressed mood  We will continue this  - DULoxetine (CYMBALTA) 20 MG capsule; Take 1 cap for2 weeks then increase to 2 caps in the am    Urinary tract infection symptoms  More likely yeast  - Wet prep - Clinic Collect  - UA Macroscopic with reflex to Microscopic and Culture - Lab Collect; Future  - UA Macroscopic with reflex to Microscopic and Culture - Lab Collect  - UA Microscopic with Reflex to Culture  - clotrimazole (LOTRIMIN) 1 % external cream; Apply topically 2 times daily    Class 3 obesity (H)  We will try her starting this combination  - phentermine (ADIPEX-P) 15 MG capsule; Take 1 capsule (15 mg) by mouth every morning For 2 weeks then increase to 30mg  - phentermine (ADIPEX-P) 30 MG capsule; Take 1 capsule (30 mg) by mouth every morning  - topiramate (TOPAMAX) 25 MG tablet; Take 1 pill at night for 2 weeks  - topiramate (TOPAMAX) 50 MG tablet; Take 1 tablet (50 mg) by mouth daily At bedtime    Globus sensation  Due to montelukast  - montelukast (SINGULAIR) 10 MG tablet; Take 1 tablet (10 mg) by mouth At Bedtime    Migraine without aura and without status migrainosus, not intractable  She is followed by neurology she had to stop the last medication she was given because she had side effects  - HYDROmorphone (DILAUDID) 2 MG tablet; Take 1 tablet (2 mg) by mouth every 6 hours as needed for pain    Irregular menses  She has irregular menses and would like a pregnancy test  - HCG qualitative urine; Future  - HCG qualitative urine       BMI:   Estimated body mass index is 41.6 kg/m  as calculated from the following:    Height as of 6/13/23: 1.651 m (5' 5\").    Weight as of this encounter: 113.4 kg (250 lb).   Weight management plan: Discussed healthy diet and exercise guidelines we will also start phentermine and Topamax for her recheck in 6 to 8 weeks    Patient Instructions   Start the topirimate tonight and the phentermine in 4 days   Then " start the allergy pill in 7 days     Anabel Silver MD  [facility] RUPINDER Feliciano is a 22 year old, presenting for the following health issues:  Anxiety and Depression        7/27/2023     2:02 PM   Additional Questions   Roomed by LAURENCE Amin   Accompanied by self       History of Present Illness       Mental Health Follow-up:  Patient presents to follow-up on Depression & Anxiety.Patient's depression since last visit has been:  Better  The patient is not having other symptoms associated with depression.  Patient's anxiety since last visit has been:  Better  The patient is not having other symptoms associated with anxiety.  Any significant life events: No  Patient is feeling anxious or having panic attacks.  Patient has no concerns about alcohol or drug use.        Reporting vaginal symptoms for the past 1.5 weeks, milky, white discharge.    Some vaginal itching and possible odor, also reporting burning sensation.   She did have Diflucan at home and has taken 2 doses so far, symptoms are not improving.       Would like to discuss her weight.     Cost of Wegovy is too expensive with her insurance   She is still having the feeling that she has something in the back of her throat     ED/UC Followup:    Facility:  [facility]   Date of visit: 6/29/23  Reason for visit: Swallowing problem   Current Status: symptoms returned over the past 2 weeks.   Feels like something is in throat, denies pain but is having discomfort.   Wt Readings from Last 5 Encounters:   07/27/23 113.4 kg (250 lb)   06/13/23 112.2 kg (247 lb 6.4 oz)   05/13/23 108.9 kg (240 lb)   05/09/23 112 kg (247 lb)   03/01/23 108.9 kg (240 lb)                 Review of Systems   As above      Objective    /74   Pulse 86   Temp 97.9  F (36.6  C) (Tympanic)   Resp 18   Wt 113.4 kg (250 lb)   SpO2 99%   BMI 41.60 kg/m    Body mass index is 41.6 kg/m .  Physical Exam   GENERAL: healthy, alert  and no distress  HENT: ear canals and TM's normal, nose and mouth without ulcers or lesions  NECK: no adenopathy, no asymmetry, masses, or scars and thyroid normal to palpation  RESP: lungs clear to auscultation - no rales, rhonchi or wheezes  MS: no gross musculoskeletal defects noted, no edema    Results for orders placed or performed in visit on 07/27/23   UA Macroscopic with reflex to Microscopic and Culture - Lab Collect     Status: Abnormal    Specimen: Urine, Clean Catch   Result Value Ref Range    Color Urine Yellow Colorless, Straw, Light Yellow, Yellow    Appearance Urine Clear Clear    Glucose Urine Negative Negative mg/dL    Bilirubin Urine Negative Negative    Ketones Urine Negative Negative mg/dL    Specific Gravity Urine >=1.030 1.003 - 1.035    Blood Urine Trace (A) Negative    pH Urine 5.5 5.0 - 7.0    Protein Albumin Urine Negative Negative mg/dL    Urobilinogen Urine 0.2 0.2, 1.0 E.U./dL    Nitrite Urine Negative Negative    Leukocyte Esterase Urine Negative Negative   UA Microscopic with Reflex to Culture     Status: Abnormal   Result Value Ref Range    Bacteria Urine Few (A) None Seen /HPF    RBC Urine 2-5 (A) 0-2 /HPF /HPF    WBC Urine 0-5 0-5 /HPF /HPF    Squamous Epithelials Urine Moderate (A) None Seen /LPF    WBC Clumps Urine Present (A) None Seen /HPF    Transitional Epithelials Urine Few (A) None Seen /HPF    Mucus Urine Present (A) None Seen /LPF    Narrative    Urine Culture not indicated   HCG qualitative urine     Status: Normal   Result Value Ref Range    hCG Urine Qualitative Negative Negative   Wet prep - Clinic Collect     Status: Abnormal    Specimen: Vagina; Swab   Result Value Ref Range    Trichomonas Absent Absent    Yeast Absent Absent    Clue Cells Absent Absent    WBCs/high power field 1+ (A) None       Anabel Silver M.D.

## 2023-08-02 DIAGNOSIS — B37.31 YEAST INFECTION OF THE VAGINA: ICD-10-CM

## 2023-08-02 DIAGNOSIS — T88.7XXA SIDE EFFECT OF MEDICATION: ICD-10-CM

## 2023-08-02 RX ORDER — FLUCONAZOLE 150 MG/1
150 TABLET ORAL
Qty: 3 TABLET | Refills: 0 | Status: SHIPPED | OUTPATIENT
Start: 2023-08-02 | End: 2023-09-27

## 2023-08-02 NOTE — TELEPHONE ENCOUNTER
"Routing refill request to provider for review/approval because:  Drug not on the [facility] refill protocol       Requested Prescriptions   Pending Prescriptions Disp Refills    fluconazole (DIFLUCAN) 150 MG tablet [Pharmacy Med Name: fluconazole 150 mg tablet] 3 tablet 0     Sig: Take 1 tablet (150 mg) by mouth every 72 hours       Antifungal Agents Failed - 8/2/2023  7:19 AM        Failed - Not Fluconazole or Terconazole      If oral Fluconazole or Terconazole, may refill if indicated in progress notes.           Passed - Recent (12 mo) or future (30 days) visit within the authorizing provider's specialty     Patient has had an office visit with the authorizing provider or a provider within the authorizing providers department within the previous 12 mos or has a future within next 30 days. See \"Patient Info\" tab in inbasket, or \"Choose Columns\" in Meds & Orders section of the refill encounter.              Passed - Medication is active on med list                 Leno Valencia RN 08/02/23 7:38 AM   "

## 2023-08-17 ENCOUNTER — MYC MEDICAL ADVICE (OUTPATIENT)
Dept: FAMILY MEDICINE | Facility: CLINIC | Age: 23
End: 2023-08-17
Payer: COMMERCIAL

## 2023-08-17 ENCOUNTER — TELEPHONE (OUTPATIENT)
Dept: FAMILY MEDICINE | Facility: CLINIC | Age: 23
End: 2023-08-17
Payer: COMMERCIAL

## 2023-08-17 DIAGNOSIS — J31.2 CHRONIC PHARYNGITIS: Primary | ICD-10-CM

## 2023-08-17 NOTE — TELEPHONE ENCOUNTER
Order/Referral Request    Who is requesting: Patient     Orders being requested: Referral to ENT    Reason service is needed/diagnosis: Chronic throat pain, has been seen in UC and has done a round of steroids that did not improve condition.     When are orders needed by: ASAP    Has this been discussed with Provider: Yes    Does patient have a preference on a Group/Provider/Facility? [facility]    Does patient have an appointment scheduled?: No    Where to send orders: Place orders within Epic    Could we send this information to you in [facility] or would you prefer to receive a phone call?:   Patient would prefer a phone call   Okay to leave a detailed message?: Yes at Cell number on file:    Telephone Information:   Mobile [phone]

## 2023-08-25 ENCOUNTER — MYC MEDICAL ADVICE (OUTPATIENT)
Dept: FAMILY MEDICINE | Facility: CLINIC | Age: 23
End: 2023-08-25
Payer: COMMERCIAL

## 2023-08-25 ENCOUNTER — TRANSFERRED RECORDS (OUTPATIENT)
Dept: HEALTH INFORMATION MANAGEMENT | Facility: CLINIC | Age: 23
End: 2023-08-25
Payer: COMMERCIAL

## 2023-08-31 DIAGNOSIS — E66.813 CLASS 3 OBESITY: ICD-10-CM

## 2023-08-31 RX ORDER — PHENTERMINE HYDROCHLORIDE 15 MG/1
15 CAPSULE ORAL EVERY MORNING
Qty: 90 CAPSULE | Refills: 1 | Status: SHIPPED | OUTPATIENT
Start: 2023-08-31 | End: 2024-02-23

## 2023-09-10 DIAGNOSIS — E55.9 VITAMIN D DEFICIENCY: ICD-10-CM

## 2023-09-11 RX ORDER — CHOLECALCIFEROL (VITAMIN D3) 50 MCG
1 CAPSULE ORAL DAILY
Qty: 90 CAPSULE | Refills: 3 | OUTPATIENT
Start: 2023-09-11

## 2023-09-22 NOTE — Clinical Note
[facility].         Sincerely,     [facility]
(4) walks frequently

## 2023-09-23 DIAGNOSIS — B37.31 YEAST INFECTION OF THE VAGINA: ICD-10-CM

## 2023-09-23 DIAGNOSIS — T88.7XXA SIDE EFFECT OF MEDICATION: ICD-10-CM

## 2023-09-27 ENCOUNTER — OFFICE VISIT (OUTPATIENT)
Dept: FAMILY MEDICINE | Facility: CLINIC | Age: 23
End: 2023-09-27
Payer: COMMERCIAL

## 2023-09-27 VITALS
BODY MASS INDEX: 38.49 KG/M2 | DIASTOLIC BLOOD PRESSURE: 68 MMHG | OXYGEN SATURATION: 99 % | HEIGHT: 65 IN | TEMPERATURE: 99.1 F | WEIGHT: 231 LBS | HEART RATE: 88 BPM | SYSTOLIC BLOOD PRESSURE: 128 MMHG

## 2023-09-27 DIAGNOSIS — B37.9 CANDIDA INFECTION: ICD-10-CM

## 2023-09-27 DIAGNOSIS — N89.8 VAGINAL ODOR: Primary | ICD-10-CM

## 2023-09-27 DIAGNOSIS — K21.00 GASTROESOPHAGEAL REFLUX DISEASE WITH ESOPHAGITIS WITHOUT HEMORRHAGE: ICD-10-CM

## 2023-09-27 DIAGNOSIS — R68.2 DRY MOUTH: ICD-10-CM

## 2023-09-27 DIAGNOSIS — G47.00 PERSISTENT INSOMNIA: ICD-10-CM

## 2023-09-27 LAB
ALBUMIN UR-MCNC: NEGATIVE MG/DL
APPEARANCE UR: ABNORMAL
BACTERIA #/AREA URNS HPF: ABNORMAL /HPF
BILIRUB UR QL STRIP: NEGATIVE
CLUE CELLS: ABNORMAL
COLOR UR AUTO: YELLOW
GLUCOSE UR STRIP-MCNC: NEGATIVE MG/DL
HGB UR QL STRIP: ABNORMAL
KETONES UR STRIP-MCNC: NEGATIVE MG/DL
LEUKOCYTE ESTERASE UR QL STRIP: NEGATIVE
MUCOUS THREADS #/AREA URNS LPF: PRESENT /LPF
NITRATE UR QL: NEGATIVE
PH UR STRIP: 6 [PH] (ref 5–7)
RBC #/AREA URNS AUTO: ABNORMAL /HPF
SP GR UR STRIP: >=1.03 (ref 1–1.03)
SQUAMOUS #/AREA URNS AUTO: ABNORMAL /LPF
TRICHOMONAS, WET PREP: ABNORMAL
UROBILINOGEN UR STRIP-ACNC: 0.2 E.U./DL
WBC #/AREA URNS AUTO: ABNORMAL /HPF
WBC CLUMPS #/AREA URNS HPF: PRESENT /HPF
WBC'S/HIGH POWER FIELD, WET PREP: ABNORMAL
YEAST #/AREA URNS HPF: ABNORMAL /HPF
YEAST, WET PREP: PRESENT

## 2023-09-27 PROCEDURE — 99214 OFFICE O/P EST MOD 30 MIN: CPT | Performed by: FAMILY MEDICINE

## 2023-09-27 PROCEDURE — 87210 SMEAR WET MOUNT SALINE/INK: CPT | Performed by: FAMILY MEDICINE

## 2023-09-27 PROCEDURE — 81001 URINALYSIS AUTO W/SCOPE: CPT | Performed by: FAMILY MEDICINE

## 2023-09-27 RX ORDER — FLUCONAZOLE 150 MG/1
150 TABLET ORAL
Qty: 3 TABLET | Refills: 3 | Status: SHIPPED | OUTPATIENT
Start: 2023-09-27 | End: 2023-10-04

## 2023-09-27 RX ORDER — FAMOTIDINE 20 MG/1
20 TABLET, FILM COATED ORAL DAILY
COMMUNITY
Start: 2023-09-11

## 2023-09-27 RX ORDER — TRAZODONE HYDROCHLORIDE 150 MG/1
150 TABLET ORAL AT BEDTIME
Qty: 90 TABLET | Refills: 1 | Status: SHIPPED | OUTPATIENT
Start: 2023-09-27 | End: 2024-01-23

## 2023-09-27 RX ORDER — SALIVA STIMULANT COMB. NO.3
2 SPRAY, NON-AEROSOL (ML) MUCOUS MEMBRANE PRN
Qty: 44.3 ML | Refills: 3 | Status: SHIPPED | OUTPATIENT
Start: 2023-09-27 | End: 2024-04-04

## 2023-09-27 NOTE — PROGRESS NOTES
Assessment & Plan     Vaginal odor  Results for orders placed or performed in visit on 09/27/23   UA Macroscopic with reflex to Microscopic and Culture - Lab Collect     Status: Abnormal    Specimen: Urine, Clean Catch   Result Value Ref Range    Color Urine Yellow Colorless, Straw, Light Yellow, Yellow    Appearance Urine Slightly Cloudy (A) Clear    Glucose Urine Negative Negative mg/dL    Bilirubin Urine Negative Negative    Ketones Urine Negative Negative mg/dL    Specific Gravity Urine >=1.030 1.003 - 1.035    Blood Urine Trace (A) Negative    pH Urine 6.0 5.0 - 7.0    Protein Albumin Urine Negative Negative mg/dL    Urobilinogen Urine 0.2 0.2, 1.0 E.U./dL    Nitrite Urine Negative Negative    Leukocyte Esterase Urine Negative Negative   UA Microscopic with Reflex to Culture     Status: Abnormal   Result Value Ref Range    Bacteria Urine Moderate (A) None Seen /HPF    RBC Urine 0-2 0-2 /HPF /HPF    WBC Urine 0-5 0-5 /HPF /HPF    Squamous Epithelials Urine Few (A) None Seen /LPF    WBC Clumps Urine Present (A) None Seen /HPF    Budding Yeast Urine Few (A) None Seen /HPF    Mucus Urine Present (A) None Seen /LPF    Narrative    Urine Culture not indicated   Wet prep - Clinic Collect     Status: Abnormal    Specimen: Vagina; Swab   Result Value Ref Range    Trichomonas Absent Absent    Yeast Present (A) Absent    Clue Cells Absent Absent    WBCs/high power field 1+ (A) None       - Wet prep - Clinic Collect  - UA Macroscopic with reflex to Microscopic and Culture - Lab Collect; Future  - UA Macroscopic with reflex to Microscopic and Culture - Lab Collect  - UA Microscopic with Reflex to Culture    Candida infection  Treated with Diflucan  - fluconazole (DIFLUCAN) 150 MG tablet; Take 1 tablet (150 mg) by mouth every 3 days for 3 doses    Dry mouth  We will have her try the artificial saliva it is better taking only half of the phentermine.  - artificial saliva (BIOTENE MT) SOLN solution; Swish and spit 2 mLs (2  sprays) in mouth as needed for dry mouth    Persistent insomnia  She routinely takes 3 of the 50s at bedtime I will send in 150 mg tablet and she will only have to take 1  - traZODone (DESYREL) 150 MG tablet; Take 1 tablet (150 mg) by mouth At Bedtime    Gastroesophageal reflux disease with esophagitis without hemorrhage  She would like to be referred to gastroenterology based on her mother's history and her constant dyspepsia  - Adult GI  Referral - Consult Only; Future       Work on weight loss  Regular exercise    Anabel Silver MD  [facility] RUPINDER Feliciano is a 22 year old, presenting for the following health issues:  Recheck Medication        9/27/2023     4:04 PM   Additional Questions   Roomed by Ynes GUADALUPE CMA       *Discuss Protonix   Seen ENT -  Was told Protonix at night and come back in November.   After 2-3 week, got worse.   Called back given Pepcid for the morning.     *Having vaginal odor would like to check make sure she does not have yeast infection.     History of Present Illness       Reason for visit:  Follow up on medication    She eats 2-3 servings of fruits and vegetables daily.She consumes 0 sweetened beverage(s) daily.She exercises with enough effort to increase her heart rate 30 to 60 minutes per day.  She exercises with enough effort to increase her heart rate 3 or less days per week.   She is taking medications regularly.       Medication Followup of Phentermine   Taking Medication as prescribed: yes  Side Effects:  to the 30 mg, 15 works well.   Medication Helping Symptoms:  yes    Wt Readings from Last 4 Encounters:   09/27/23 104.8 kg (231 lb)   07/27/23 113.4 kg (250 lb)   06/13/23 112.2 kg (247 lb 6.4 oz)   05/13/23 108.9 kg (240 lb)         She has only been taking half of the phentermine but this is really working for her she has already lost 19 pounds.  Which is great.  Her biggest side effect is having a dry mouth we did discuss ways to  "have to stimulate more saliva production told her sugar-free lemon drops would be the best we also did discuss artificial saliva.  She has had an odor to her discharge and some irritation in her vaginal area.  No urinary frequency but there is some dysuria she saw the ear nose and throat doctor who told her to take her Protonix at bedtime got worse over 2 to 3 weeks so they added Pepcid in the morning.  With her mother's history of large hiatal hernias, I do recommend that she consult with gastroenterology.  She has been taking 150 mg of the trazodone and this helps with sleep I did inform her that this also could make her have a dry mouth.    Review of Systems   Negative except as above      Objective    /68 (BP Location: Right arm, Patient Position: Sitting, Cuff Size: Adult Regular)   Pulse 88   Temp 99.1  F (37.3  C) (Tympanic)   Ht 1.651 m (5' 5\")   Wt 104.8 kg (231 lb)   SpO2 99%   BMI 38.44 kg/m    Body mass index is 38.44 kg/m .  Physical Exam   GENERAL: healthy, alert and no distress  ABDOMEN: soft, nontender, no hepatosplenomegaly, no masses and bowel sounds normal  SKIN: no suspicious lesions or rashes  NEURO: Normal strength and tone, mentation intact and speech normal  PSYCH: mentation appears normal, affect normal/bright    Results for orders placed or performed in visit on 09/27/23   UA Macroscopic with reflex to Microscopic and Culture - Lab Collect     Status: Abnormal    Specimen: Urine, Clean Catch   Result Value Ref Range    Color Urine Yellow Colorless, Straw, Light Yellow, Yellow    Appearance Urine Slightly Cloudy (A) Clear    Glucose Urine Negative Negative mg/dL    Bilirubin Urine Negative Negative    Ketones Urine Negative Negative mg/dL    Specific Gravity Urine >=1.030 1.003 - 1.035    Blood Urine Trace (A) Negative    pH Urine 6.0 5.0 - 7.0    Protein Albumin Urine Negative Negative mg/dL    Urobilinogen Urine 0.2 0.2, 1.0 E.U./dL    Nitrite Urine Negative Negative    " Leukocyte Esterase Urine Negative Negative   UA Microscopic with Reflex to Culture     Status: Abnormal   Result Value Ref Range    Bacteria Urine Moderate (A) None Seen /HPF    RBC Urine 0-2 0-2 /HPF /HPF    WBC Urine 0-5 0-5 /HPF /HPF    Squamous Epithelials Urine Few (A) None Seen /LPF    WBC Clumps Urine Present (A) None Seen /HPF    Budding Yeast Urine Few (A) None Seen /HPF    Mucus Urine Present (A) None Seen /LPF    Narrative    Urine Culture not indicated   Wet prep - Clinic Collect     Status: Abnormal    Specimen: Vagina; Swab   Result Value Ref Range    Trichomonas Absent Absent    Yeast Present (A) Absent    Clue Cells Absent Absent    WBCs/high power field 1+ (A) None       Anabel Silver M.D.

## 2023-09-28 RX ORDER — FLUCONAZOLE 150 MG/1
150 TABLET ORAL
Qty: 3 TABLET | Refills: 0 | Status: SHIPPED | OUTPATIENT
Start: 2023-09-28 | End: 2024-05-24

## 2023-10-02 DIAGNOSIS — E66.813 CLASS 3 OBESITY: ICD-10-CM

## 2023-10-04 RX ORDER — TOPIRAMATE 50 MG/1
50 TABLET, FILM COATED ORAL DAILY
Qty: 90 TABLET | Refills: 1 | Status: SHIPPED | OUTPATIENT
Start: 2023-10-04 | End: 2024-04-07

## 2023-10-16 ENCOUNTER — LAB (OUTPATIENT)
Dept: LAB | Facility: CLINIC | Age: 23
End: 2023-10-16
Payer: COMMERCIAL

## 2023-10-16 ENCOUNTER — E-VISIT (OUTPATIENT)
Dept: FAMILY MEDICINE | Facility: CLINIC | Age: 23
End: 2023-10-16
Payer: COMMERCIAL

## 2023-10-16 DIAGNOSIS — N76.0 ACUTE VAGINITIS: Primary | ICD-10-CM

## 2023-10-16 DIAGNOSIS — N94.9 VAGINAL SYMPTOM: ICD-10-CM

## 2023-10-16 LAB
ALBUMIN UR-MCNC: NEGATIVE MG/DL
APPEARANCE UR: CLEAR
BACTERIA #/AREA URNS HPF: ABNORMAL /HPF
BILIRUB UR QL STRIP: NEGATIVE
CLUE CELLS: NORMAL
COLOR UR AUTO: YELLOW
GLUCOSE UR STRIP-MCNC: NEGATIVE MG/DL
HGB UR QL STRIP: ABNORMAL
KETONES UR STRIP-MCNC: NEGATIVE MG/DL
LEUKOCYTE ESTERASE UR QL STRIP: NEGATIVE
MUCOUS THREADS #/AREA URNS LPF: PRESENT /LPF
NITRATE UR QL: NEGATIVE
PH UR STRIP: 5.5 [PH] (ref 5–7)
RBC #/AREA URNS AUTO: ABNORMAL /HPF
SP GR UR STRIP: >=1.03 (ref 1–1.03)
SQUAMOUS #/AREA URNS AUTO: ABNORMAL /LPF
TRICHOMONAS, WET PREP: NORMAL
UROBILINOGEN UR STRIP-ACNC: 0.2 E.U./DL
WBC #/AREA URNS AUTO: ABNORMAL /HPF
WBC'S/HIGH POWER FIELD, WET PREP: NORMAL
YEAST, WET PREP: NORMAL

## 2023-10-16 PROCEDURE — 81001 URINALYSIS AUTO W/SCOPE: CPT

## 2023-10-16 PROCEDURE — 99421 OL DIG E/M SVC 5-10 MIN: CPT | Performed by: FAMILY MEDICINE

## 2023-10-16 PROCEDURE — 87210 SMEAR WET MOUNT SALINE/INK: CPT

## 2023-10-31 ENCOUNTER — TRANSFERRED RECORDS (OUTPATIENT)
Dept: HEALTH INFORMATION MANAGEMENT | Facility: CLINIC | Age: 23
End: 2023-10-31
Payer: COMMERCIAL

## 2023-11-13 DIAGNOSIS — L70.8 OTHER ACNE: ICD-10-CM

## 2023-11-13 RX ORDER — CLINDAMYCIN AND BENZOYL PEROXIDE 10; 50 MG/G; MG/G
GEL TOPICAL DAILY
Qty: 100 G | Refills: 3 | Status: SHIPPED | OUTPATIENT
Start: 2023-11-13 | End: 2024-04-04

## 2023-11-19 ENCOUNTER — NURSE TRIAGE (OUTPATIENT)
Dept: NURSING | Facility: CLINIC | Age: 23
End: 2023-11-19
Payer: COMMERCIAL

## 2023-11-19 NOTE — TELEPHONE ENCOUNTER
"Nurse Triage SBAR    Is this a 2nd Level Triage? NO    Situation: Pt calling with question about oral contraceptives.     Background: Pt accidentally took 2 day's worth of birth control pills on Thursday last week (puts them in a weekly pill box). Now will be short for her last active pill on Tuesday before she starts her placebo pills. Wondering what to do.    Assessment: Says she usually gets her period the week before her sugar pills. Describes bleeding as very light.     Protocol Recommended Disposition:    when Office is open.    Recommendation: Offered to route a message to pt's Gyn care team for instruction. Advised someone will follow up with her on Monday. Call back with any other questions.     Janna Singh, RN, BSN  [facility]   Triage Nurse Advisor    Reason for Disposition   [1] Caller has NON-URGENT question AND [2] triager unable to answer question    Additional Information   Negative: Needs refill of BCPs   Negative: [1] Caller has URGENT question AND [2] triager unable to answer question   Negative: [1] Periods with > 6 soaked pads or tampons per day AND [2] lasts > 7 days   Negative: SEVERE vaginal bleeding (e.g., soaking 2 pads or tampons per hour and present 2 or more hours; 1 menstrual cup every 2 hours)   Negative: SEVERE dizziness (e.g., unable to stand, requires support to walk, feels like passing out now)   Negative: Patient sounds very sick or weak to the triager   Negative: MODERATE vaginal bleeding (e.g., soaking 1 pad or tampon per hour and present > 6 hours; 1 menstrual cup every 6 hours)   Negative: [1] Vaginal bleeding is WORSE than normal (e.g., heavier) AND [2] dizziness or lightheadedness   Negative: [1] Constant abdominal pain AND [2] present > 2 hours   Negative: Questions mainly about emergency contraception   Negative: Taking a progestin-only birth control pill (contains only progestin; also called the \"minipill\")   Negative: [1] Abdominal pain AND [2] " pregnant < 20 weeks   Negative: [1] Vaginal bleeding AND [2] pregnant < 20 weeks   Negative: [1] SEVERE abdominal pain (e.g., excruciating) AND [2] present > 1 hour    Protocols used: Contraception - Birth Control Pills - Combined-A-

## 2024-01-10 ENCOUNTER — PATIENT OUTREACH (OUTPATIENT)
Dept: CARE COORDINATION | Facility: CLINIC | Age: 24
End: 2024-01-10
Payer: COMMERCIAL

## 2024-01-11 ENCOUNTER — TRANSFERRED RECORDS (OUTPATIENT)
Dept: HEALTH INFORMATION MANAGEMENT | Facility: CLINIC | Age: 24
End: 2024-01-11
Payer: COMMERCIAL

## 2024-01-19 ENCOUNTER — TRANSFERRED RECORDS (OUTPATIENT)
Dept: HEALTH INFORMATION MANAGEMENT | Facility: CLINIC | Age: 24
End: 2024-01-19
Payer: COMMERCIAL

## 2024-01-23 DIAGNOSIS — G47.00 PERSISTENT INSOMNIA: ICD-10-CM

## 2024-01-23 DIAGNOSIS — B00.1 RECURRENT COLD SORES: ICD-10-CM

## 2024-01-23 RX ORDER — VALACYCLOVIR HYDROCHLORIDE 500 MG/1
500 TABLET, FILM COATED ORAL DAILY
Qty: 90 TABLET | Refills: 0 | Status: SHIPPED | OUTPATIENT
Start: 2024-01-23 | End: 2024-04-04

## 2024-01-23 RX ORDER — TRAZODONE HYDROCHLORIDE 150 MG/1
150 TABLET ORAL AT BEDTIME
Qty: 90 TABLET | Refills: 0 | Status: SHIPPED | OUTPATIENT
Start: 2024-01-23 | End: 2024-10-04 | Stop reason: DRUGHIGH

## 2024-01-23 NOTE — TELEPHONE ENCOUNTER
Routing refill request to provider for review/approval because:  Drug interaction warning                Requested Prescriptions   Pending Prescriptions Disp Refills    valACYclovir (VALTREX) 500 MG tablet [Pharmacy Med Name: valacyclovir 500 mg tablet] 90 tablet 0     Sig: Take 1 tablet (500 mg) by mouth daily       Antivirals for Herpes Protocol Passed - 1/23/2024  7:17 AM        Passed - Patient is age 12 or older        Passed - Recent (12 mo) or future (30 days) visit within the authorizing provider's specialty     The patient must have completed an in-person or virtual visit within the past 12 months or has a future visit scheduled within the next 90 days with the authorizing provider s specialty.  Urgent care and e-visits do not quality as an office visit for this protocol.          Passed - Medication is active on med list        Passed - Normal serum creatinine on file in past 12 months     Recent Labs   Lab Test 02/09/23  1623   CR 0.64       Ok to refill medication if creatinine is low            traZODone (DESYREL) 150 MG tablet [Pharmacy Med Name: trazodone 150 mg tablet] 90 tablet 0     Sig: Take 1 tablet (150 mg) by mouth At Bedtime       Serotonin Modulators Passed - 1/23/2024  7:17 AM        Passed - Recent (12 mo) or future (30 days) visit within the authorizing provider's specialty     The patient must have completed an in-person or virtual visit within the past 12 months or has a future visit scheduled within the next 90 days with the authorizing provider s specialty.  Urgent care and e-visits do not quality as an office visit for this protocol.          Passed - Medication is active on med list        Passed - Patient is age 18 or older        Passed - No active pregnancy on record        Passed - No positive pregnancy test in past 12 months                 Shantanu Oliveros RN 01/23/24 8:52 AM

## 2024-01-24 ENCOUNTER — PATIENT OUTREACH (OUTPATIENT)
Dept: CARE COORDINATION | Facility: CLINIC | Age: 24
End: 2024-01-24
Payer: COMMERCIAL

## 2024-02-01 ENCOUNTER — PATIENT OUTREACH (OUTPATIENT)
Dept: OBGYN | Facility: CLINIC | Age: 24
End: 2024-02-01
Payer: COMMERCIAL

## 2024-02-01 NOTE — LETTER
February 1, 2024      Mary Bueno  [address]  [place] [address]        Dear Mary,     To ensure we are providing the best quality care, we have reviewed your chart and see that you are due for:    Cervical Cancer Screening:    Please call Dept: [phone] to schedule an Annual Exam with Pap Smear.    If you have completed the tests outside of [facility], please have the results forwarded to our office Fax # [phone]. We will update the chart for your primary Physician to review before your next annual physical.    Thank you for trusting us with your health care.         Sincerely,        Eva Nicole MD

## 2024-02-01 NOTE — TELEPHONE ENCOUNTER
"Panel Management Review    Health Maintenance List    Health Maintenance   Topic Date Due    ADVANCE CARE PLANNING  Never done    INFLUENZA VACCINE (1) 09/01/2023    COVID-19 Vaccine (3 - 2023-24 season) 09/01/2023    PAP  Never done    DTAP/TDAP/TD IMMUNIZATION (7 - Td or Tdap) 10/15/2023    PHQ-9  01/27/2024    YEARLY PREVENTIVE VISIT  02/09/2024    CHLAMYDIA SCREENING  05/09/2024    HEPATITIS C SCREENING  Completed    HIV SCREENING  Completed    DEPRESSION ACTION PLAN  Completed    IPV IMMUNIZATION  Completed    HPV IMMUNIZATION  Completed    HEPATITIS B IMMUNIZATION  Completed    Pneumococcal Vaccine: Pediatrics (0 to 5 Years) and At-Risk Patients (6 to 64 Years)  Aged Out    MENINGITIS IMMUNIZATION  Aged Out    RSV MONOCLONAL ANTIBODY  Aged Out    NICOTINE/TOBACCO CESSATION COUNSELING Q 1 YR  Discontinued       Composite cancer screening  Chart review shows that this patient is due/due soon for the following Pap Smear  No results found for: \"PAP\"  Past Surgical History:   Procedure Laterality Date    ARTHROSCOPY ANKLE Right 1/26/2017    Procedure: ARTHROSCOPY ANKLE;  Surgeon: Davis Mayfield DPM;  Location: [facility] OR    DILATION AND CURETTAGE, HYSTEROSCOPY DIAGNOSTIC, COMBINED N/A 7/19/2021    Procedure: HYSTEROSCOPY, DIAGNOSTIC, WITH DILATION AND CURETTAGE OF UTERUS;  Surgeon: Ryan Salmeron MD;  Location: [facility] OR    NO HISTORY OF SURGERY      TONSILLECTOMY, ADENOIDECTOMY, COMBINED Bilateral 5/13/2022    Procedure: TONSILLECTOMY  and ADENOIDECTOMY;  Surgeon: Zach Cross MD;  Location: [facility] OR       Is hysterectomy listed in surgical history? No   Is mastectomy listed in surgical history? No     Summary:    Patient is due/failing the following:   Pap Smear    Action needed: Patient needs office visit for pap smear.    Type of outreach:  Sent [employer] message.      Staff Signature:  COLLINS PIKE MA     "

## 2024-02-20 DIAGNOSIS — E66.813 CLASS 3 OBESITY: ICD-10-CM

## 2024-02-23 RX ORDER — PHENTERMINE HYDROCHLORIDE 15 MG/1
15 CAPSULE ORAL EVERY MORNING
Qty: 30 CAPSULE | Refills: 0 | Status: SHIPPED | OUTPATIENT
Start: 2024-02-23 | End: 2024-04-04

## 2024-02-23 NOTE — TELEPHONE ENCOUNTER
Patient called back and notified of provider message. Transferred to scheduling to follow-up.    Routing back to provider to refill as do not see it was refilled yet.     Aleta KAUR RN  [facility]  [phone]

## 2024-02-23 NOTE — TELEPHONE ENCOUNTER
Please call patient. They are due for an office visit /follow up and possibly labs.  Refilled [id number] you . Anabel Silver M.D.

## 2024-03-02 DIAGNOSIS — B00.1 RECURRENT COLD SORES: ICD-10-CM

## 2024-03-05 RX ORDER — VALACYCLOVIR HYDROCHLORIDE 1 G/1
1000 TABLET, FILM COATED ORAL 2 TIMES DAILY
Qty: 8 TABLET | Refills: 3 | Status: SHIPPED | OUTPATIENT
Start: 2024-03-05 | End: 2024-09-09

## 2024-04-04 ENCOUNTER — OFFICE VISIT (OUTPATIENT)
Dept: FAMILY MEDICINE | Facility: CLINIC | Age: 24
End: 2024-04-04
Payer: COMMERCIAL

## 2024-04-04 VITALS
OXYGEN SATURATION: 99 % | HEART RATE: 105 BPM | SYSTOLIC BLOOD PRESSURE: 120 MMHG | DIASTOLIC BLOOD PRESSURE: 80 MMHG | TEMPERATURE: 98.7 F | BODY MASS INDEX: 33.94 KG/M2 | HEIGHT: 65 IN | RESPIRATION RATE: 16 BRPM | WEIGHT: 203.7 LBS

## 2024-04-04 DIAGNOSIS — L70.8 OTHER ACNE: ICD-10-CM

## 2024-04-04 DIAGNOSIS — G47.00 PERSISTENT INSOMNIA: ICD-10-CM

## 2024-04-04 DIAGNOSIS — R51.9 CHRONIC DAILY HEADACHE: ICD-10-CM

## 2024-04-04 DIAGNOSIS — G43.009 MIGRAINE WITHOUT AURA AND WITHOUT STATUS MIGRAINOSUS, NOT INTRACTABLE: ICD-10-CM

## 2024-04-04 DIAGNOSIS — B00.1 RECURRENT COLD SORES: ICD-10-CM

## 2024-04-04 DIAGNOSIS — Z30.011 ENCOUNTER FOR INITIAL PRESCRIPTION OF CONTRACEPTIVE PILLS: ICD-10-CM

## 2024-04-04 DIAGNOSIS — Z12.4 CERVICAL CANCER SCREENING: Primary | ICD-10-CM

## 2024-04-04 DIAGNOSIS — E66.813 CLASS 3 OBESITY: ICD-10-CM

## 2024-04-04 DIAGNOSIS — Z11.3 SCREEN FOR STD (SEXUALLY TRANSMITTED DISEASE): ICD-10-CM

## 2024-04-04 DIAGNOSIS — E55.9 VITAMIN D DEFICIENCY: ICD-10-CM

## 2024-04-04 DIAGNOSIS — F43.22 ADJUSTMENT DISORDER WITH ANXIOUS MOOD: ICD-10-CM

## 2024-04-04 DIAGNOSIS — K21.9 GASTROESOPHAGEAL REFLUX DISEASE WITHOUT ESOPHAGITIS: ICD-10-CM

## 2024-04-04 LAB — HCG UR QL: NEGATIVE

## 2024-04-04 PROCEDURE — 87491 CHLMYD TRACH DNA AMP PROBE: CPT | Performed by: FAMILY MEDICINE

## 2024-04-04 PROCEDURE — 81025 URINE PREGNANCY TEST: CPT | Performed by: FAMILY MEDICINE

## 2024-04-04 PROCEDURE — 87591 N.GONORRHOEAE DNA AMP PROB: CPT | Performed by: FAMILY MEDICINE

## 2024-04-04 PROCEDURE — 99214 OFFICE O/P EST MOD 30 MIN: CPT | Performed by: FAMILY MEDICINE

## 2024-04-04 RX ORDER — GUAIFENESIN 1200 MG/1
1200 TABLET, EXTENDED RELEASE ORAL 2 TIMES DAILY
Qty: 60 TABLET | Refills: 2 | Status: SHIPPED | OUTPATIENT
Start: 2024-04-04

## 2024-04-04 RX ORDER — PANTOPRAZOLE SODIUM 40 MG/1
40 TABLET, DELAYED RELEASE ORAL DAILY
Qty: 90 TABLET | Refills: 3 | Status: SHIPPED | OUTPATIENT
Start: 2024-04-04

## 2024-04-04 RX ORDER — TRAZODONE HYDROCHLORIDE 150 MG/1
150 TABLET ORAL AT BEDTIME
Qty: 90 TABLET | Refills: 0 | Status: CANCELLED | OUTPATIENT
Start: 2024-04-04

## 2024-04-04 RX ORDER — VALACYCLOVIR HYDROCHLORIDE 500 MG/1
500 TABLET, FILM COATED ORAL DAILY
Qty: 90 TABLET | Refills: 0 | Status: SHIPPED | OUTPATIENT
Start: 2024-04-04 | End: 2024-09-09

## 2024-04-04 RX ORDER — ACETAMINOPHEN 160 MG
1 TABLET,DISINTEGRATING ORAL DAILY
Qty: 90 CAPSULE | Refills: 3 | Status: SHIPPED | OUTPATIENT
Start: 2024-04-04

## 2024-04-04 RX ORDER — HYDROMORPHONE HYDROCHLORIDE 2 MG/1
2 TABLET ORAL EVERY 6 HOURS PRN
Qty: 10 TABLET | Refills: 0 | Status: SHIPPED | OUTPATIENT
Start: 2024-04-04 | End: 2024-04-07

## 2024-04-04 RX ORDER — PROMETHAZINE HYDROCHLORIDE 25 MG/1
25 TABLET ORAL EVERY 8 HOURS PRN
Qty: 60 TABLET | Refills: 1 | Status: SHIPPED | OUTPATIENT
Start: 2024-04-04

## 2024-04-04 RX ORDER — HYDROXYZINE HYDROCHLORIDE 25 MG/1
25-50 TABLET, FILM COATED ORAL EVERY 6 HOURS PRN
Qty: 60 TABLET | Refills: 1 | Status: CANCELLED | OUTPATIENT
Start: 2024-04-04

## 2024-04-04 RX ORDER — CLINDAMYCIN AND BENZOYL PEROXIDE 10; 50 MG/G; MG/G
GEL TOPICAL DAILY
Qty: 100 G | Refills: 3 | Status: SHIPPED | OUTPATIENT
Start: 2024-04-04 | End: 2024-09-20

## 2024-04-04 RX ORDER — TRAZODONE HYDROCHLORIDE 100 MG/1
200 TABLET ORAL AT BEDTIME
Qty: 60 TABLET | Refills: 2 | Status: SHIPPED | OUTPATIENT
Start: 2024-04-04

## 2024-04-04 RX ORDER — NORGESTIMATE AND ETHINYL ESTRADIOL 7DAYSX3 LO
1 KIT ORAL DAILY
Qty: 84 TABLET | Refills: 3 | Status: SHIPPED | OUTPATIENT
Start: 2024-04-04 | End: 2024-09-23 | Stop reason: SINTOL

## 2024-04-04 RX ORDER — PHENTERMINE HYDROCHLORIDE 15 MG/1
15 CAPSULE ORAL EVERY MORNING
Qty: 90 CAPSULE | Refills: 2 | Status: SHIPPED | OUTPATIENT
Start: 2024-04-04

## 2024-04-04 RX ORDER — PROPRANOLOL HYDROCHLORIDE 10 MG/1
10-30 TABLET ORAL 3 TIMES DAILY PRN
Qty: 90 TABLET | Refills: 1 | Status: SHIPPED | OUTPATIENT
Start: 2024-04-04

## 2024-04-04 ASSESSMENT — PATIENT HEALTH QUESTIONNAIRE - PHQ9
SUM OF ALL RESPONSES TO PHQ QUESTIONS 1-9: 5
SUM OF ALL RESPONSES TO PHQ QUESTIONS 1-9: 5
10. IF YOU CHECKED OFF ANY PROBLEMS, HOW DIFFICULT HAVE THESE PROBLEMS MADE IT FOR YOU TO DO YOUR WORK, TAKE CARE OF THINGS AT HOME, OR GET ALONG WITH OTHER PEOPLE: NOT DIFFICULT AT ALL

## 2024-04-04 ASSESSMENT — PAIN SCALES - GENERAL: PAINLEVEL: NO PAIN (0)

## 2024-04-04 NOTE — PROGRESS NOTES
Assessment & Plan     Cervical cancer screening  Due     Persistent insomnia  Working   - traZODone (DESYREL) 100 MG tablet; Take 2 tablets (200 mg) by mouth at bedtime    Other acne  cont  - clindamycin-benzoyl peroxide (BENZACLIN) 1-5 % external gel; Apply topically daily To face, legs, underarms and buttocks once daily    Migraine without aura and without status migrainosus, not intractable  Uses sparingly  - promethazine (PHENERGAN) 25 MG tablet; Take 1 tablet (25 mg) by mouth every 8 hours as needed for nausea  - HYDROmorphone (DILAUDID) 2 MG tablet; Take 1 tablet (2 mg) by mouth every 6 hours as needed for pain    Chronic daily headache    - promethazine (PHENERGAN) 25 MG tablet; Take 1 tablet (25 mg) by mouth every 8 hours as needed for nausea    Adjustment disorder with anxious mood  Use prn   - propranolol (INDERAL) 10 MG tablet; Take 1-3 tablets (10-30 mg) by mouth 3 times daily as needed (anxiety)    Recurrent cold sores    - valACYclovir (VALTREX) 500 MG tablet; Take 1 tablet (500 mg) by mouth daily    Vitamin D deficiency  Ccont   - cholecalciferol (VITAMIN D3) 50 MCG (2000 UT) CAPS; Take 1 capsule by mouth daily    Encounter for initial prescription of contraceptive pills  Cont   - norgestim-eth estrad triphasic (ORTHO TRI-CYCLEN LO) 0.18/0.215/0.25 MG-25 MCG tablet; Take 1 tablet by mouth daily    Gastroesophageal reflux disease without esophagitis  This works   - pantoprazole (PROTONIX) 40 MG EC tablet; Take 1 tablet (40 mg) by mouth daily  - guaiFENesin (MUCINEX MAXIMUM STRENGTH) 1200 MG TB12; Take 1 tablet (1,200 mg) by mouth 2 times daily    Screen for STD (sexually transmitted disease)    - Neisseria gonorrhoeae PCR - Clinic Collect  - Chlamydia trachomatis PCR - Clinic Collect  - HCG Qual, Urine ([id number]); Future  - HCG Qual, Urine ([id number])    Class 3 obesity (H)  Has lost wuite a bit of weight will cont   - phentermine 15 MG capsule; Take 1 capsule (15 mg) by mouth every  "morning            BMI  Estimated body mass index is 33.9 kg/m  as calculated from the following:    Height as of this encounter: 1.651 m (5' 5\").    Weight as of this encounter: 92.4 kg (203 lb 11.2 oz).   Weight management plan: cont phentermine and inceased exercise       See Patient Instructions    Subjective   Jodie is a 23 year old, presenting for the following health issues:  Recheck Medication      4/4/2024    10:24 AM   Additional Questions   Roomed by veronica   Accompanied by self         4/4/2024    10:24 AM   Patient Reported Additional Medications   Patient reports taking the following new medications none     Chief Complaint   Patient presents with    Recheck Medication    STD     Would like to be checked for stds and would like a pregnancy test        History of Present Illness       Reason for visit:  Follow up on meds    She eats 0-1 servings of fruits and vegetables daily.She consumes 1 sweetened beverage(s) daily.She exercises with enough effort to increase her heart rate 30 to 60 minutes per day.  She exercises with enough effort to increase her heart rate 3 or less days per week. She is missing 2 dose(s) of medications per week.  She is not taking prescribed medications regularly due to remembering to take.     Wt Readings from Last 5 Encounters:   04/04/24 92.4 kg (203 lb 11.2 oz)   09/27/23 104.8 kg (231 lb)   07/27/23 113.4 kg (250 lb)   06/13/23 112.2 kg (247 lb 6.4 oz)   05/13/23 108.9 kg (240 lb)             Medication Followup of Phentermine   Taking Medication as prescribed: yes  Side Effects:  dry mouth, lightheadedness   Medication Helping Symptoms:  yes- has lost almost 50 pounds       Review of Systems  Constitutional, HEENT, cardiovascular, pulmonary, gi and gu systems are negative, except as otherwise noted.      Objective    /80   Pulse 105   Temp 98.7  F (37.1  C) (Tympanic)   Resp 16   Ht 1.651 m (5' 5\")   Wt 92.4 kg (203 lb 11.2 oz)   SpO2 99%   BMI 33.90 kg/m  "   Body mass index is 33.9 kg/m .  Physical Exam   GENERAL: alert and no distress  PSYCH: mentation appears normal, affect normal/bright    Results for orders placed or performed in visit on 04/04/24   HCG Qual, Urine ([id number])     Status: Normal   Result Value Ref Range    hCG Urine Qualitative Negative Negative   Neisseria gonorrhoeae PCR - Clinic Collect     Status: Normal    Specimen: Urine, Voided   Result Value Ref Range    Neisseria gonorrhoeae Negative Negative   Chlamydia trachomatis PCR - Clinic Collect     Status: Normal    Specimen: Urine, Voided   Result Value Ref Range    Chlamydia trachomatis Negative Negative           Signed Electronically by: Anabel Silver MD

## 2024-04-04 NOTE — PATIENT INSTRUCTIONS
Each of us has a built in tendency to find it easier to stay up late at night or get up early in the morning.  Being a  night owl  or  early bird  is a function of our internal body clock.  When you are forced to keep a sleep schedule that goes against your typical habits (because a job or other responsibilities) insomnia can be the result.  Try the following changes to see if you can improve your sleep patterns:  Pick a sleep and wake schedule with about 7-8 hours in bed that is consistent.  That means keep the same bedtime and rise time every day of the week including the weekends, for the next 4-6 weeks  Try to get outside for at least 30 minutes but preferably up to 2 hours to get some bright sunlight.  Bright light is the best way to  set  your internal body clock and bright light exposure before bedtime may help delay your tendency to fall asleep too early.  Keep as busy as possible in the evening hours and avoid falling asleep and avoid sedentary activities as much as possible.    Please keep a sleep log or diary during this time to track your sleep patterns     Insomnia - Stress Management  Stress and tension can be big factors contributing to insomnia.  If you can t relax your mind and body, then you won t be able to sleep.  You may benefit from stress management self-help techniques and may benefit from the relaxation books recommended in our treatment resources handout.      Stress Management and Relaxation Books  The Relaxation and Stress Reduction Workbook by Michelle Ibanez, Izzy Buenrostro and Eloy Hagen (2008)  Stress Management Workbook: Techniques and Self-Assessment Procedures by Vero Vo and Devin Morgan (1997)  A Mindfulness-Based Stress Reduction Workbook by Daniel Atwood and Kyleigh Kapadia (2010)  The Complete Stress Management Workbook by Kamaljit Rodriguez, Reji Larry and Morteza Raphael (1996)  Assert Yourself by Amy Bae and Ramírez Bae (1977)    Wt  Readings from Last 10 Encounters:   04/04/24 92.4 kg (203 lb 11.2 oz)   09/27/23 104.8 kg (231 lb)   07/27/23 113.4 kg (250 lb)   06/13/23 112.2 kg (247 lb 6.4 oz)   05/13/23 108.9 kg (240 lb)   05/09/23 112 kg (247 lb)   03/01/23 108.9 kg (240 lb)   02/09/23 109.8 kg (242 lb)   12/28/22 110.7 kg (244 lb 1.6 oz)   05/13/22 113.4 kg (250 lb) (>99%, Z= 2.51)*     * Growth percentiles are based on CDC (Girls, 2-20 Years) data.

## 2024-04-05 DIAGNOSIS — E66.813 CLASS 3 OBESITY: ICD-10-CM

## 2024-04-05 LAB
C TRACH DNA SPEC QL NAA+PROBE: NEGATIVE
N GONORRHOEA DNA SPEC QL NAA+PROBE: NEGATIVE

## 2024-04-07 RX ORDER — TOPIRAMATE 50 MG/1
50 TABLET, FILM COATED ORAL DAILY
Qty: 90 TABLET | Refills: 1 | Status: SHIPPED | OUTPATIENT
Start: 2024-04-07

## 2024-04-13 ENCOUNTER — HEALTH MAINTENANCE LETTER (OUTPATIENT)
Age: 24
End: 2024-04-13

## 2024-04-15 ENCOUNTER — E-VISIT (OUTPATIENT)
Dept: URGENT CARE | Facility: CLINIC | Age: 24
End: 2024-04-15
Payer: COMMERCIAL

## 2024-04-15 DIAGNOSIS — N89.8 VAGINAL DISCHARGE: Primary | ICD-10-CM

## 2024-04-15 PROCEDURE — 99207 PR NON-BILLABLE SERV PER CHARTING: CPT | Performed by: PHYSICIAN ASSISTANT

## 2024-04-16 NOTE — PATIENT INSTRUCTIONS
Thank you for choosing us for your care. Given your symptoms, I would like you to do a lab-only visit to determine what is causing them.  I have placed the orders.  You need to be seen In person or schedule a phone or video visit for full std testing. Please schedule an appointment with the lab right here in [facility], or call [phone].  I will let you know when the results are back and next steps to take.

## 2024-04-17 ENCOUNTER — VIRTUAL VISIT (OUTPATIENT)
Dept: FAMILY MEDICINE | Facility: CLINIC | Age: 24
End: 2024-04-17
Payer: COMMERCIAL

## 2024-04-17 DIAGNOSIS — Z72.51 UNPROTECTED SEX: Primary | ICD-10-CM

## 2024-04-17 PROCEDURE — 99213 OFFICE O/P EST LOW 20 MIN: CPT

## 2024-04-17 NOTE — PROGRESS NOTES
Jodie is a 23 year old who is being evaluated via a billable telephone visit.    What phone number would you like to be contacted at? See demo tab  How would you like to obtain your AVS? MyChart  Originating Location (pt. Location): Home    Distant Location (provider location):  On-site    Assessment & Plan     Unprotected sex  - CHLAMYDIA TRACHOMATIS PCR; Future  - NEISSERIA GONORRHOEA PCR; Future  - Treponema Abs w Reflex to RPR and Titer; Future  - HCG Qual, Urine ([id number]); Future  - HIV Antigen Antibody Combo; Future  Patient had an episode of unprotected sex after her partner took the condom off in the middle of sex.  She is hoping for STD test today.  No symptoms    Subjective   Jodie is a 23 year old, presenting for the following health issues:  No chief complaint on file.  Needs an STD test.  Had sex and person took the condom off in the middle of sex- This happened early morning on early morning on Saturday/technically Sunday.    No known exposure or symptoms.    HPI           Objective           Vitals:  No vitals were obtained today due to virtual visit.    Physical Exam   General: Alert and no distress //Respiratory: No audible wheeze, cough, or shortness of breath // Psychiatric:  Appropriate affect, tone, and pace of words          Phone call duration: 15 minutes  Signed Electronically by: Morro Canseco NP

## 2024-04-18 ENCOUNTER — LAB (OUTPATIENT)
Dept: LAB | Facility: CLINIC | Age: 24
End: 2024-04-18
Payer: COMMERCIAL

## 2024-04-18 DIAGNOSIS — Z72.51 UNPROTECTED SEX: ICD-10-CM

## 2024-04-18 DIAGNOSIS — Z72.51 UNPROTECTED SEX: Primary | ICD-10-CM

## 2024-04-18 LAB
HCG UR QL: NEGATIVE
HIV 1+2 AB+HIV1 P24 AG SERPL QL IA: NONREACTIVE

## 2024-04-18 PROCEDURE — 87491 CHLMYD TRACH DNA AMP PROBE: CPT

## 2024-04-18 PROCEDURE — 36415 COLL VENOUS BLD VENIPUNCTURE: CPT

## 2024-04-18 PROCEDURE — 81025 URINE PREGNANCY TEST: CPT

## 2024-04-18 PROCEDURE — 87389 HIV-1 AG W/HIV-1&-2 AB AG IA: CPT

## 2024-04-18 PROCEDURE — 87591 N.GONORRHOEAE DNA AMP PROB: CPT | Mod: 59

## 2024-04-18 PROCEDURE — 86780 TREPONEMA PALLIDUM: CPT

## 2024-04-19 DIAGNOSIS — Z72.51 UNPROTECTED SEX: Primary | ICD-10-CM

## 2024-04-19 LAB
C TRACH DNA SPEC QL NAA+PROBE: NEGATIVE
C TRACH DNA SPEC QL NAA+PROBE: NEGATIVE
N GONORRHOEA DNA SPEC QL NAA+PROBE: NEGATIVE
N GONORRHOEA DNA SPEC QL NAA+PROBE: NEGATIVE
T PALLIDUM AB SER QL: NONREACTIVE

## 2024-04-22 ENCOUNTER — TRANSFERRED RECORDS (OUTPATIENT)
Dept: HEALTH INFORMATION MANAGEMENT | Facility: CLINIC | Age: 24
End: 2024-04-22
Payer: COMMERCIAL

## 2024-04-24 ENCOUNTER — HOSPITAL ENCOUNTER (EMERGENCY)
Facility: CLINIC | Age: 24
Discharge: HOME | End: 2024-04-24
Attending: EMERGENCY MEDICINE | Admitting: EMERGENCY MEDICINE
Payer: COMMERCIAL

## 2024-04-24 ENCOUNTER — NURSE TRIAGE (OUTPATIENT)
Dept: NURSING | Facility: CLINIC | Age: 24
End: 2024-04-24
Payer: COMMERCIAL

## 2024-04-24 ENCOUNTER — APPOINTMENT (OUTPATIENT)
Dept: GENERAL RADIOLOGY | Facility: CLINIC | Age: 24
End: 2024-04-24
Attending: EMERGENCY MEDICINE
Payer: COMMERCIAL

## 2024-04-24 VITALS
WEIGHT: 200 LBS | HEIGHT: 65 IN | HEART RATE: 91 BPM | SYSTOLIC BLOOD PRESSURE: 110 MMHG | BODY MASS INDEX: 33.32 KG/M2 | RESPIRATION RATE: 18 BRPM | DIASTOLIC BLOOD PRESSURE: 73 MMHG | TEMPERATURE: 97.8 F | OXYGEN SATURATION: 100 %

## 2024-04-24 DIAGNOSIS — R07.9 CHEST PAIN, UNSPECIFIED TYPE: ICD-10-CM

## 2024-04-24 DIAGNOSIS — U07.1 COVID-19 VIRUS INFECTION: ICD-10-CM

## 2024-04-24 LAB
ANION GAP SERPL CALCULATED.3IONS-SCNC: 9 MMOL/L (ref 7–15)
BASOPHILS # BLD AUTO: 0 10E3/UL (ref 0–0.2)
BASOPHILS NFR BLD AUTO: 0 %
BUN SERPL-MCNC: 9.8 MG/DL (ref 6–20)
CALCIUM SERPL-MCNC: 9 MG/DL (ref 8.6–10)
CHLORIDE SERPL-SCNC: 105 MMOL/L (ref 98–107)
CREAT SERPL-MCNC: 0.82 MG/DL (ref 0.51–0.95)
D DIMER PPP FEU-MCNC: 0.42 UG/ML FEU (ref 0–0.5)
DEPRECATED HCO3 PLAS-SCNC: 25 MMOL/L (ref 22–29)
EGFRCR SERPLBLD CKD-EPI 2021: >90 ML/MIN/1.73M2
EOSINOPHIL # BLD AUTO: 0.1 10E3/UL (ref 0–0.7)
EOSINOPHIL NFR BLD AUTO: 1 %
ERYTHROCYTE [DISTWIDTH] IN BLOOD BY AUTOMATED COUNT: 13.7 % (ref 10–15)
GLUCOSE SERPL-MCNC: 111 MG/DL (ref 70–99)
HCG SERPL QL: NEGATIVE
HCT VFR BLD AUTO: 43.2 % (ref 35–47)
HGB BLD-MCNC: 13.6 G/DL (ref 11.7–15.7)
IMM GRANULOCYTES # BLD: 0 10E3/UL
IMM GRANULOCYTES NFR BLD: 0 %
LYMPHOCYTES # BLD AUTO: 2.1 10E3/UL (ref 0.8–5.3)
LYMPHOCYTES NFR BLD AUTO: 30 %
MCH RBC QN AUTO: 26.6 PG (ref 26.5–33)
MCHC RBC AUTO-ENTMCNC: 31.5 G/DL (ref 31.5–36.5)
MCV RBC AUTO: 85 FL (ref 78–100)
MONOCYTES # BLD AUTO: 0.4 10E3/UL (ref 0–1.3)
MONOCYTES NFR BLD AUTO: 5 %
NEUTROPHILS # BLD AUTO: 4.5 10E3/UL (ref 1.6–8.3)
NEUTROPHILS NFR BLD AUTO: 63 %
NRBC # BLD AUTO: 0 10E3/UL
NRBC BLD AUTO-RTO: 0 /100
PLATELET # BLD AUTO: 178 10E3/UL (ref 150–450)
POTASSIUM SERPL-SCNC: 3.8 MMOL/L (ref 3.4–5.3)
RBC # BLD AUTO: 5.11 10E6/UL (ref 3.8–5.2)
SODIUM SERPL-SCNC: 139 MMOL/L (ref 135–145)
WBC # BLD AUTO: 7.1 10E3/UL (ref 4–11)

## 2024-04-24 PROCEDURE — 99285 EMERGENCY DEPT VISIT HI MDM: CPT | Mod: 25 | Performed by: EMERGENCY MEDICINE

## 2024-04-24 PROCEDURE — 36415 COLL VENOUS BLD VENIPUNCTURE: CPT | Performed by: EMERGENCY MEDICINE

## 2024-04-24 PROCEDURE — 82374 ASSAY BLOOD CARBON DIOXIDE: CPT | Performed by: EMERGENCY MEDICINE

## 2024-04-24 PROCEDURE — 250N000013 HC RX MED GY IP 250 OP 250 PS 637: Performed by: EMERGENCY MEDICINE

## 2024-04-24 PROCEDURE — 71046 X-RAY EXAM CHEST 2 VIEWS: CPT

## 2024-04-24 PROCEDURE — 84703 CHORIONIC GONADOTROPIN ASSAY: CPT | Performed by: EMERGENCY MEDICINE

## 2024-04-24 PROCEDURE — 85379 FIBRIN DEGRADATION QUANT: CPT | Performed by: EMERGENCY MEDICINE

## 2024-04-24 PROCEDURE — 85004 AUTOMATED DIFF WBC COUNT: CPT | Performed by: EMERGENCY MEDICINE

## 2024-04-24 PROCEDURE — 99284 EMERGENCY DEPT VISIT MOD MDM: CPT | Performed by: EMERGENCY MEDICINE

## 2024-04-24 RX ORDER — IBUPROFEN 600 MG/1
600 TABLET, FILM COATED ORAL ONCE
Status: COMPLETED | OUTPATIENT
Start: 2024-04-24 | End: 2024-04-24

## 2024-04-24 RX ORDER — BENZONATATE 100 MG/1
100 CAPSULE ORAL 3 TIMES DAILY PRN
Qty: 18 CAPSULE | Refills: 0 | Status: SHIPPED | OUTPATIENT
Start: 2024-04-24 | End: 2024-04-24

## 2024-04-24 RX ORDER — ACETAMINOPHEN 500 MG
1000 TABLET ORAL ONCE
Status: COMPLETED | OUTPATIENT
Start: 2024-04-24 | End: 2024-04-24

## 2024-04-24 RX ORDER — BENZONATATE 100 MG/1
100 CAPSULE ORAL 3 TIMES DAILY PRN
Qty: 18 CAPSULE | Refills: 0 | Status: SHIPPED | OUTPATIENT
Start: 2024-04-24 | End: 2024-09-23

## 2024-04-24 RX ADMIN — ACETAMINOPHEN 1000 MG: 500 TABLET, FILM COATED ORAL at 16:23

## 2024-04-24 RX ADMIN — IBUPROFEN 600 MG: 600 TABLET ORAL at 16:23

## 2024-04-24 ASSESSMENT — COLUMBIA-SUICIDE SEVERITY RATING SCALE - C-SSRS
6. HAVE YOU EVER DONE ANYTHING, STARTED TO DO ANYTHING, OR PREPARED TO DO ANYTHING TO END YOUR LIFE?: NO
1. IN THE PAST MONTH, HAVE YOU WISHED YOU WERE DEAD OR WISHED YOU COULD GO TO SLEEP AND NOT WAKE UP?: NO
2. HAVE YOU ACTUALLY HAD ANY THOUGHTS OF KILLING YOURSELF IN THE PAST MONTH?: NO

## 2024-04-24 ASSESSMENT — ACTIVITIES OF DAILY LIVING (ADL)
ADLS_ACUITY_SCORE: 38
ADLS_ACUITY_SCORE: 38

## 2024-04-24 NOTE — Clinical Note
Mary Bueno was seen and treated in our emergency department on 4/24/2024.  She may return to work on 04/26/2024.  Presuming no fever for past 24 hours without taking ibuprofen or tylenol.      If you have any questions or concerns, please don't hesitate to call.      Emanuel Reece MD

## 2024-04-24 NOTE — ED PROVIDER NOTES
"  History     Chief Complaint   Patient presents with    Chest Pain    Shortness of Breath     HPI  Mary Bueno is a 23 year old female with history notable for positive COVID testing 1 week ago with progressive cough, dyspnea and pain with inspiration in her central chest.  No history of VTE however is on oral contraceptives.  Denies any pain or swelling in her legs.  No hemoptysis.  No known family history of VTE.  Spoke to nurse triage line and advised to come to emergency department.    \"Per disposition: Go to ED Now      Pt will be going to ED now for evaluation \"    The patient's PMHx, Surgical Hx, Allergies, and Medications were all reviewed with the patient.    Allergies:  Allergies   Allergen Reactions    Qulipta [Atogepant] Swelling     Throat swelling       Problem List:    Patient Active Problem List    Diagnosis Date Noted    Encounter for intrauterine device placement 05/09/2023     Priority: Medium     Kyleena IUD insertion   NDC: 69398-579-63  Lot: [id number]  Exp: 8/2024      BMI 40.0-44.9, adult (H) 02/09/2023     Priority: Medium    Breakthrough bleeding on Nexplanon 06/22/2021     Priority: Medium     Added automatically from request for surgery [id number]      Endometrial thickening on ultrasound 06/22/2021     Priority: Medium     Added automatically from request for surgery [id number]      Nexplanon insertion 09/17/2020     Priority: Medium     Lot: [id number]  Exp: 10/2023    KELLEY Banres  9/2020      Chronic migraine without aura without status migrainosus, not intractable 05/22/2019     Priority: Medium    Increased insulin level 05/25/2017     Priority: Medium    Current mild episode of major depressive disorder without prior episode (H24) 07/11/2016     Priority: Medium    Gastroesophageal reflux disease, esophagitis presence not specified 07/11/2016     Priority: Medium     IMO Regulatory Load OCT 2020      Depression with anxiety 07/02/2016     Priority: Medium    Obesity " 05/14/2014     Priority: Medium    Adjustment disorder with mixed anxiety and depressed mood 09/20/2013     Priority: Medium    Weight disorder 06/18/2012     Priority: Medium    Recurrent UTI 09/16/2011     Priority: Medium     Referred for peds uro and u/s   - normal retroperitoneal u/s   - pending VCUG - mom hasn't yet scheduled ( 12/6/11)      Health Care Home 05/06/2013     Priority: Low     *See Letters for [facility] Care Plan: My Access Plan              Past Medical History:    Past Medical History:   Diagnosis Date    Migraines     Uncomplicated asthma        Past Surgical History:    Past Surgical History:   Procedure Laterality Date    ARTHROSCOPY ANKLE Right 1/26/2017    Procedure: ARTHROSCOPY ANKLE;  Surgeon: Davis Mayfield DPM;  Location: [facility] OR    DILATION AND CURETTAGE, HYSTEROSCOPY DIAGNOSTIC, COMBINED N/A 7/19/2021    Procedure: HYSTEROSCOPY, DIAGNOSTIC, WITH DILATION AND CURETTAGE OF UTERUS;  Surgeon: Ryan Salmeron MD;  Location: [facility] OR    NO HISTORY OF SURGERY      TONSILLECTOMY, ADENOIDECTOMY, COMBINED Bilateral 5/13/2022    Procedure: TONSILLECTOMY  and ADENOIDECTOMY;  Surgeon: Zach Cross MD;  Location: [facility] OR       Family History:    Family History   Problem Relation Age of Onset    Migraines Mother     Other - See Comments Father 31        MVA    Migraines Maternal Grandmother     Migraines Maternal Aunt     Coronary Artery Disease No family hx of     Other Cancer No family hx of        Social History:  Marital Status:  Single [1]  Social History     Tobacco Use    Smoking status: Never    Smokeless tobacco: Never   Vaping Use    Vaping status: Former    Substances: Nicotine   Substance Use Topics    Alcohol use: No     Alcohol/week: 0.0 standard drinks of alcohol    Drug use: No        Medications:    benzonatate (TESSALON) 100 MG capsule  cholecalciferol (VITAMIN D3) 50 MCG (2000 UT) CAPS  clindamycin-benzoyl peroxide (BENZACLIN) 1-5 % external gel  clotrimazole (LOTRIMIN) 1  "% external cream  famotidine (PEPCID) 20 MG tablet  fluconazole (DIFLUCAN) 150 MG tablet  guaiFENesin (MUCINEX MAXIMUM STRENGTH) 1200 MG TB12  hydrOXYzine (ATARAX) 25 MG tablet  norgestim-eth estrad triphasic (ORTHO TRI-CYCLEN LO) 0.18/0.215/0.25 MG-25 MCG tablet  ondansetron (ZOFRAN ODT) 8 MG ODT tab  pantoprazole (PROTONIX) 40 MG EC tablet  phentermine 15 MG capsule  promethazine (PHENERGAN) 25 MG tablet  propranolol (INDERAL) 10 MG tablet  topiramate (TOPAMAX) 50 MG tablet  traZODone (DESYREL) 100 MG tablet  traZODone (DESYREL) 150 MG tablet  valACYclovir (VALTREX) 1000 mg tablet  valACYclovir (VALTREX) 500 MG tablet          Review of Systems  Pertinent positives and negatives mentioned in HPI    Physical Exam   BP: 111/78  Pulse: 104  Temp: 97.8  F (36.6  C)  Resp: 18  Height: 165.1 cm (5' 5\")  Weight: 90.7 kg (200 lb)  SpO2: 99 %    GEN: Awake, alert, and cooperative.  Does not appear acutely distressed  CV : Extremities warm and well perfused.  PULM: Normal effort. Speaking in full sentences.  NEURO: Normal speech. Following commands.  Answering questions and interacting appropriately.   EXT: No tenderness, edema, erythema or ecchymosis of lower extremities.  INT: Warm. No diaphoresis. Normal color.     ED Course        Procedures         EKG: Interpreted by Emanuel Reece MD sinus rhythm with rate of 88 bpm.  Normal axis.  Normal R wave progression.  Normal intervals.  No ectopy.  No ST segment elevations or depressions.  No abnormal T wave inversions.  Impression sinus rhythm with no evidence of acute ischemia or heart strain.       Critical Care time:  none               Results for orders placed or performed during the hospital encounter of 04/24/24 (from the past 24 hour(s))   Basic metabolic panel   Result Value Ref Range    Sodium 139 135 - 145 mmol/L    Potassium 3.8 3.4 - 5.3 mmol/L    Chloride 105 98 - 107 mmol/L    Carbon Dioxide (CO2) 25 22 - 29 mmol/L    Anion Gap 9 7 - 15 mmol/L    Urea " Nitrogen 9.8 6.0 - 20.0 mg/dL    Creatinine 0.82 0.51 - 0.95 mg/dL    GFR Estimate >90 >60 mL/min/1.73m2    Calcium 9.0 8.6 - 10.0 mg/dL    Glucose 111 (H) 70 - 99 mg/dL   CBC with platelets differential    Narrative    The following orders were created for panel order CBC with platelets differential.  Procedure                               Abnormality         Status                     ---------                               -----------         ------                     CBC with platelets and d...[[id number]]                      Final result                 Please view results for these tests on the individual orders.   D dimer quantitative   Result Value Ref Range    D-Dimer Quantitative 0.42 0.00 - 0.50 ug/mL FEU    Narrative    This D-dimer assay is intended for use in conjunction with a clinical pretest probability assessment model to exclude pulmonary embolism (PE) and deep venous thrombosis (DVT) in outpatients suspected of PE or DVT. The cut-off value is 0.50 ug/mL FEU.   HCG qualitative pregnancy (blood)   Result Value Ref Range    hCG Serum Qualitative Negative Negative   CBC with platelets and differential   Result Value Ref Range    WBC Count 7.1 4.0 - 11.0 10e3/uL    RBC Count 5.11 3.80 - 5.20 10e6/uL    Hemoglobin 13.6 11.7 - 15.7 g/dL    Hematocrit 43.2 35.0 - 47.0 %    MCV 85 78 - 100 fL    MCH 26.6 26.5 - 33.0 pg    MCHC 31.5 31.5 - 36.5 g/dL    RDW 13.7 10.0 - 15.0 %    Platelet Count 178 150 - 450 10e3/uL    % Neutrophils 63 %    % Lymphocytes 30 %    % Monocytes 5 %    % Eosinophils 1 %    % Basophils 0 %    % Immature Granulocytes 0 %    NRBCs per 100 WBC 0 <1 /100    Absolute Neutrophils 4.5 1.6 - 8.3 10e3/uL    Absolute Lymphocytes 2.1 0.8 - 5.3 10e3/uL    Absolute Monocytes 0.4 0.0 - 1.3 10e3/uL    Absolute Eosinophils 0.1 0.0 - 0.7 10e3/uL    Absolute Basophils 0.0 0.0 - 0.2 10e3/uL    Absolute Immature Granulocytes 0.0 <=0.4 10e3/uL    Absolute NRBCs 0.0 10e3/uL   Chest XR,  PA & LAT     Narrative    CHEST TWO VIEWS 4/24/2024 3:50 PM     HISTORY: pleuritic chest pain, worsening dyspnea and cough, covid +  one week ago    COMPARISON: 9/14/2017.       Impression    IMPRESSION: Cardiopericardial silhouette is within normal limits. No  focal airspace consolidation. No pleural effusion. No discernible  pneumothorax. No acute displaced fracture.    WENDY PAIGE MD         SYSTEM ID:  [id number]       Medications   acetaminophen (TYLENOL) tablet 1,000 mg (1,000 mg Oral $Given 4/24/24 1623)   ibuprofen (ADVIL/MOTRIN) tablet 600 mg (600 mg Oral $Given 4/24/24 1623)       Assessments & Plan (with Medical Decision Making)   23 year old female with a history notable for positive COVID testing 1 week ago with progressive symptoms and now with pleuritic chest pain and feeling lightheaded.  She is on oral contraception.  EKG without signs of heart strain or acute ischemia.  Heart rate of 88 however triage heart rate of 104 so she is not PERC negative.  Will obtain dimer and consideration of CT chest if elevated unfortunately COVID may also elevate her dimer.    On exam no wheezing and lungs clear.  No clinical signs of DVT.  CBC normal.  hCG negative.  BMP grossly normal, glucose 111 1 (not fasting) D-dimer is not elevated at 0.42 making VTE extremely unlikely.  Chest x-ray without acute infiltrate, effusion or pneumothorax on my interpretation and radiologist concurs.    Suspect that her symptoms are just related to her SARS-CoV-2 infection and continue symptomatic treatment.  She did inquire about something for cough.  Stated medications not very effective mom would like Tessalon Perles.  This was sent to preferred pharmacy I did discuss TV, honey, lemon as treatment for cough.  To follow-up in clinic as needed.  ED return precautions discussed.  Work note provided.         I have reviewed the nursing notes.         Discharge Medication List as of 4/24/2024  4:35 PM          Final diagnoses:   COVID-19 virus  infection   Chest pain, unspecified type     Emanuel Reece MD        4/24/2024   [facility] EMERGENCY DEPT    Disclaimer: This note consists of words and symbols derived from keyboarding and dictation using voice recognition software.  As a result, there may be errors that have gone undetected.  Please consider this when interpreting information found in this note.               Emanuel Reece MD  04/24/24 [id number]

## 2024-04-24 NOTE — TELEPHONE ENCOUNTER
Pt is phoning stating that she was dx with COVID on 04/18/2024 and states that she feels like she is getting worse     Pt is having a hard time breathing - pt is having shortness of breath at rest    Pt states that her chest feels like she has a bunch of needles stabbing her     Per disposition: Go to ED Now     Pt will be going to ED now for evaluation     Care advice given per protocol and when to call back. Pt verbalized understanding and agrees to plan of care.    Shelley Rosales RN  [place] Nurse Advisor  12:56 PM 4/24/2024    Reason for Disposition   SEVERE or constant chest pain or pressure  (Exception: Mild central chest pain, present only when coughing.)    Additional Information   Negative: SEVERE difficulty breathing (e.g., struggling for each breath, speaks in single words)   Negative: Difficult to awaken or acting confused (e.g., disoriented, slurred speech)   Negative: Bluish (or gray) lips or face now   Negative: Shock suspected (e.g., cold/pale/clammy skin, too weak to stand, low BP, rapid pulse)   Negative: Sounds like a life-threatening emergency to the triager   Negative: Diagnosed or suspected COVID-19 and symptoms lasting 3 or more weeks   Negative: COVID-19 exposure and no symptoms   Negative: COVID-19 vaccine reaction suspected (e.g., fever, headache, muscle aches) occurring 1 to 3 days after getting vaccine   Negative: COVID-19 vaccine, questions about   Negative: Lives with someone known to have influenza (flu test positive) and flu-like symptoms (e.g., cough, runny nose, sore throat, SOB; with or without fever)   Negative: Possible COVID-19 symptoms and triager concerned about severity of symptoms or other causes   Negative: COVID-19 and breastfeeding, questions about    Protocols used: Coronavirus (COVID-19) Diagnosed or [id number]

## 2024-04-24 NOTE — ED TRIAGE NOTES
"Pt diagnosed with COVID last Thursday, and her associated symptoms have continued to persist and worsen. Her primary concern today is the persistent cough, CP, and SOB.     Pt states that she has been coughing \"very badly\", causing sore ribs and CP. Sharp CP that worsens when pt coughs and takes deep breaths. Pt also reports that SOB has not improved.   Pt has had fevers and chills. Afebrile in triage.     Triage Assessment (Adult)       Row Name 04/24/24 1324          Triage Assessment    Airway WDL WDL        Respiratory WDL    Respiratory WDL X;rhythm/pattern     Rhythm/Pattern, Respiratory shortness of breath        Skin Circulation/Temperature WDL    Skin Circulation/Temperature WDL WDL        Cardiac WDL    Cardiac WDL X;chest pain        Chest Pain Assessment    Chest Pain Location midsternal     Character sharp        Peripheral/Neurovascular WDL    Peripheral Neurovascular WDL WDL        Cognitive/Neuro/Behavioral WDL    Cognitive/Neuro/Behavioral WDL WDL                     "

## 2024-04-24 NOTE — DISCHARGE INSTRUCTIONS
Your evaluation the emergency department is reassuring and no signs of blood clot and your EKG was reassuring.    Recommend that you get plenty of rest, take ibuprofen and Tylenol for aches and pains and drink plenty of fluids.  Follow-up with your primary care provider if not feeling some improvement in your symptoms in a few days.  Return to emergency department for new or worsening symptoms.

## 2024-05-01 ENCOUNTER — OFFICE VISIT (OUTPATIENT)
Dept: FAMILY MEDICINE | Facility: CLINIC | Age: 24
End: 2024-05-01
Payer: COMMERCIAL

## 2024-05-01 VITALS
BODY MASS INDEX: 34.16 KG/M2 | HEIGHT: 65 IN | SYSTOLIC BLOOD PRESSURE: 121 MMHG | TEMPERATURE: 99.6 F | HEART RATE: 78 BPM | WEIGHT: 205 LBS | DIASTOLIC BLOOD PRESSURE: 81 MMHG | OXYGEN SATURATION: 100 %

## 2024-05-01 DIAGNOSIS — J06.9 URI WITH COUGH AND CONGESTION: Primary | ICD-10-CM

## 2024-05-01 PROCEDURE — 99213 OFFICE O/P EST LOW 20 MIN: CPT | Performed by: FAMILY MEDICINE

## 2024-05-01 RX ORDER — CODEINE PHOSPHATE AND GUAIFENESIN 10; 100 MG/5ML; MG/5ML
1-2 SOLUTION ORAL EVERY 4 HOURS PRN
Qty: 180 ML | Refills: 0 | Status: SHIPPED | OUTPATIENT
Start: 2024-05-01

## 2024-05-01 RX ORDER — DEXAMETHASONE 4 MG/1
8 TABLET ORAL
Qty: 6 TABLET | Refills: 0 | Status: SHIPPED | OUTPATIENT
Start: 2024-05-01 | End: 2024-09-23

## 2024-05-01 NOTE — LETTER
May 1, 2024      Mary Bueno  [address]  [facility] [address]        To Whom It May Concern:    Mary Bueon was seen today.  Please excuse her due to illness. She may return to work when she is fever free for 24 hours and has improved.         Sincerely,        Anabel Silver MD

## 2024-05-01 NOTE — PROGRESS NOTES
Assessment & Plan     URI with cough and congestion  Will have her take these   - dexAMETHasone (DECADRON) 4 MG tablet; Take 2 tablets (8 mg) by mouth daily (with breakfast) for 3 days  - guaiFENesin-codeine (ROBITUSSIN AC) 100-10 MG/5ML solution; Take 5-10 mLs by mouth every 4 hours as needed for cough      Note for work given     MED REC REQUIRED  Post Medication Reconciliation Status: discharge medications reconciled and changed, per note/orders    In 3-5 days if not improving     Subjective   Jodie is a 23 year old, presenting for the following health issues:  ER F/U        5/1/2024     8:41 AM   Additional Questions   Roomed by Ynes GUADALUPE CMA     History of Present Illness       Reason for visit:  Cough that isnt going away  Symptom onset:  1-2 weeks ago  Symptoms include:  Coughing  Symptom intensity:  Moderate  Symptom progression:  Staying the same  Had these symptoms before:  No  What makes it worse:  Moving deep breaths  What makes it better:  Rest/ sleep    She eats 2-3 servings of fruits and vegetables daily.She consumes 1 sweetened beverage(s) daily.She exercises with enough effort to increase her heart rate 30 to 60 minutes per day.  She exercises with enough effort to increase her heart rate 3 or less days per week. She is missing 2 dose(s) of medications per week.  She is not taking prescribed medications regularly due to remembering to take.     Fever this morning, took tylenol  She has been having more cough and she is having sore throat chest pain with cough and deep breaths     ED/UC Followup:  Facility:  [facility] ED  Date of visit: 4/24/24  Reason for visit: Covid  Current Status:   Took tylenol this morning     Review of Systems  Constitutional, HEENT, cardiovascular, pulmonary, gi and gu systems are negative, except as otherwise noted.      Objective    /81 (BP Location: Right arm, Patient Position: Sitting, Cuff Size: Adult Regular)   Pulse 78   Temp 99.6  F (37.6  " C) (Tympanic)   Ht 1.651 m (5' 5\")   Wt 93 kg (205 lb)   LMP 03/17/2024 (Approximate)   SpO2 100%   BMI 34.11 kg/m    Body mass index is 34.11 kg/m .  Physical Exam   GENERAL: alert and no distress  EYES: Eyes grossly normal to inspection, PERRL and conjunctivae and sclerae normal  HENT: ear canals and TM's normal, nose and mouth without ulcers or lesions  NECK: no adenopathy, no asymmetry, masses, or scars  RESP: lungs clear to auscultation - no rales, rhonchi or wheezes  CV: regular rate and rhythm, normal S1 S2, no S3 or S4, no murmur, click or rub, no peripheral edema   NEURO: Normal strength and tone, mentation intact and speech normal            Signed Electronically by: Anabel Silver MD    "

## 2024-05-01 NOTE — LETTER
May 1, 2024      Mary Bueno  [address]  [facility] [address]        To Whom It May Concern:    Mary Bueno was seen today.  Please excuse her due to illness.    She may return to work when she is fever free and improved.     Sincerely,        Anabel Silver MD

## 2024-05-20 ENCOUNTER — LAB (OUTPATIENT)
Dept: LAB | Facility: CLINIC | Age: 24
End: 2024-05-20
Payer: COMMERCIAL

## 2024-05-20 ENCOUNTER — E-VISIT (OUTPATIENT)
Dept: URGENT CARE | Facility: CLINIC | Age: 24
End: 2024-05-20
Payer: COMMERCIAL

## 2024-05-20 DIAGNOSIS — R30.0 DYSURIA: ICD-10-CM

## 2024-05-20 DIAGNOSIS — R30.0 DYSURIA: Primary | ICD-10-CM

## 2024-05-20 DIAGNOSIS — Z72.51 UNPROTECTED SEX: ICD-10-CM

## 2024-05-20 DIAGNOSIS — N39.0 URINARY TRACT INFECTION WITHOUT HEMATURIA, SITE UNSPECIFIED: Primary | ICD-10-CM

## 2024-05-20 LAB
ALBUMIN UR-MCNC: ABNORMAL MG/DL
APPEARANCE UR: ABNORMAL
BACTERIA #/AREA URNS HPF: ABNORMAL /HPF
BILIRUB UR QL STRIP: NEGATIVE
CLUE CELLS: ABNORMAL
COLOR UR AUTO: ABNORMAL
GLUCOSE UR STRIP-MCNC: NEGATIVE MG/DL
HGB UR QL STRIP: NEGATIVE
KETONES UR STRIP-MCNC: ABNORMAL MG/DL
LEUKOCYTE ESTERASE UR QL STRIP: ABNORMAL
MUCOUS THREADS #/AREA URNS LPF: PRESENT /LPF
NITRATE UR QL: POSITIVE
PH UR STRIP: 5.5 [PH] (ref 5–7)
RBC #/AREA URNS AUTO: ABNORMAL /HPF
SP GR UR STRIP: >=1.03 (ref 1–1.03)
SQUAMOUS #/AREA URNS AUTO: ABNORMAL /LPF
TRICHOMONAS, WET PREP: ABNORMAL
UROBILINOGEN UR STRIP-ACNC: 1 E.U./DL
WBC #/AREA URNS AUTO: ABNORMAL /HPF
WBC'S/HIGH POWER FIELD, WET PREP: ABNORMAL
YEAST, WET PREP: ABNORMAL

## 2024-05-20 PROCEDURE — 87491 CHLMYD TRACH DNA AMP PROBE: CPT

## 2024-05-20 PROCEDURE — 87086 URINE CULTURE/COLONY COUNT: CPT

## 2024-05-20 PROCEDURE — 87186 SC STD MICRODIL/AGAR DIL: CPT

## 2024-05-20 PROCEDURE — 87591 N.GONORRHOEAE DNA AMP PROB: CPT

## 2024-05-20 PROCEDURE — 87210 SMEAR WET MOUNT SALINE/INK: CPT

## 2024-05-20 PROCEDURE — 99421 OL DIG E/M SVC 5-10 MIN: CPT | Performed by: PREVENTIVE MEDICINE

## 2024-05-20 PROCEDURE — 81001 URINALYSIS AUTO W/SCOPE: CPT

## 2024-05-20 PROCEDURE — 87389 HIV-1 AG W/HIV-1&-2 AB AG IA: CPT

## 2024-05-20 PROCEDURE — 36415 COLL VENOUS BLD VENIPUNCTURE: CPT

## 2024-05-20 RX ORDER — NITROFURANTOIN 25; 75 MG/1; MG/1
100 CAPSULE ORAL 2 TIMES DAILY
Qty: 10 CAPSULE | Refills: 0 | Status: SHIPPED | OUTPATIENT
Start: 2024-05-20 | End: 2024-05-25

## 2024-05-20 NOTE — PATIENT INSTRUCTIONS
Dear Mary Bueno,     After reviewing your responses, I would like you to come in for a urine test to make sure we treat you correctly. This urine test is to evaluate you for a possible urinary tract infection, and should be scheduled for today or tomorrow. Schedule a Lab Only appointment here.     Lab appointments are not available at most locations on the weekends. If no Lab Only appointment is available, you should be seen in any of our convenient Walk-in or Urgent Care Centers, which can be found on our website here.     You will receive instructions with your results in [facility] once they are available.     If your symptoms worsen, you develop pain in your back or stomach, develop fevers, or are not improving in 5 days, please contact your primary care provider for an appointment or visit a Walk-in or Urgent Care Center to be seen.     Thanks again for choosing us as your health care partner,     Escobar Banks MD, MD

## 2024-05-21 LAB
C TRACH DNA SPEC QL NAA+PROBE: NEGATIVE
HIV 1+2 AB+HIV1 P24 AG SERPL QL IA: NONREACTIVE
N GONORRHOEA DNA SPEC QL NAA+PROBE: NEGATIVE

## 2024-05-22 DIAGNOSIS — B37.31 YEAST INFECTION OF THE VAGINA: ICD-10-CM

## 2024-05-22 DIAGNOSIS — T88.7XXA SIDE EFFECT OF MEDICATION: ICD-10-CM

## 2024-05-22 LAB — BACTERIA UR CULT: ABNORMAL

## 2024-05-24 RX ORDER — FLUCONAZOLE 150 MG/1
TABLET ORAL
Qty: 3 TABLET | Refills: 0 | Status: SHIPPED | OUTPATIENT
Start: 2024-05-24

## 2024-05-24 NOTE — TELEPHONE ENCOUNTER
Patient has been seen by [facility], Dr. Ann and [facility] for her leg and the nerve pain. She was in the ED and they prescribed her 5 more pills to get her through until her medication has been refill.   She is currently taking gabapentin 100 mg capsules; 300 mg at night. She is to increase dose during the day, but [facility] would like her to have this filled by her primary. We do not have any records of recent visits from these clinics. Advised that she be seen in clinic, but there are no openings and she will need them refilled before the weekend. Please advise in Dr. Silver's absence.  Thank you.  Pao Kumar RN     Attending Attestation (For Attendings USE Only)...

## 2024-07-02 DIAGNOSIS — K21.9 GASTROESOPHAGEAL REFLUX DISEASE WITHOUT ESOPHAGITIS: Primary | ICD-10-CM

## 2024-07-02 RX ORDER — ONDANSETRON 8 MG/1
8 TABLET, ORALLY DISINTEGRATING ORAL EVERY 8 HOURS PRN
Qty: 20 TABLET | Refills: 1 | Status: SHIPPED | OUTPATIENT
Start: 2024-07-02

## 2024-07-02 NOTE — TELEPHONE ENCOUNTER
Pending Prescriptions:                       Disp   Refills    ondansetron (ZOFRAN ODT) 8 MG ODT tab                       Routing refill request to provider for review/approval because:  Medication is reported/historical      Santhosh Welch RN

## 2024-08-02 DIAGNOSIS — L70.8 OTHER ACNE: ICD-10-CM

## 2024-08-02 RX ORDER — CLINDAMYCIN AND BENZOYL PEROXIDE 10; 50 MG/G; MG/G
GEL TOPICAL DAILY
Qty: 100 G | Refills: 3 | OUTPATIENT
Start: 2024-08-02

## 2024-08-07 ENCOUNTER — PATIENT OUTREACH (OUTPATIENT)
Dept: CARE COORDINATION | Facility: CLINIC | Age: 24
End: 2024-08-07
Payer: COMMERCIAL

## 2024-09-07 DIAGNOSIS — B00.1 RECURRENT COLD SORES: ICD-10-CM

## 2024-09-09 RX ORDER — VALACYCLOVIR HYDROCHLORIDE 500 MG/1
500 TABLET, FILM COATED ORAL DAILY
Qty: 90 TABLET | Refills: 1 | Status: SHIPPED | OUTPATIENT
Start: 2024-09-09

## 2024-09-09 RX ORDER — VALACYCLOVIR HYDROCHLORIDE 1 G/1
1000 TABLET, FILM COATED ORAL 2 TIMES DAILY
Qty: 8 TABLET | Refills: 3 | Status: SHIPPED | OUTPATIENT
Start: 2024-09-09

## 2024-09-16 ENCOUNTER — OFFICE VISIT (OUTPATIENT)
Dept: FAMILY MEDICINE | Facility: CLINIC | Age: 24
End: 2024-09-16
Payer: COMMERCIAL

## 2024-09-16 VITALS
RESPIRATION RATE: 22 BRPM | SYSTOLIC BLOOD PRESSURE: 110 MMHG | DIASTOLIC BLOOD PRESSURE: 68 MMHG | BODY MASS INDEX: 34.49 KG/M2 | OXYGEN SATURATION: 99 % | HEART RATE: 96 BPM | TEMPERATURE: 98.8 F | WEIGHT: 207 LBS | HEIGHT: 65 IN

## 2024-09-16 DIAGNOSIS — R30.0 DYSURIA: Primary | ICD-10-CM

## 2024-09-16 DIAGNOSIS — R31.9 URINARY TRACT INFECTION WITH HEMATURIA, SITE UNSPECIFIED: ICD-10-CM

## 2024-09-16 DIAGNOSIS — N39.0 URINARY TRACT INFECTION WITH HEMATURIA, SITE UNSPECIFIED: ICD-10-CM

## 2024-09-16 DIAGNOSIS — Z12.4 CERVICAL CANCER SCREENING: ICD-10-CM

## 2024-09-16 LAB
ALBUMIN UR-MCNC: ABNORMAL MG/DL
APPEARANCE UR: CLEAR
BACTERIA #/AREA URNS HPF: ABNORMAL /HPF
BILIRUB UR QL STRIP: NEGATIVE
COLOR UR AUTO: YELLOW
GLUCOSE UR STRIP-MCNC: 100 MG/DL
HCG UR QL: NEGATIVE
HGB UR QL STRIP: ABNORMAL
KETONES UR STRIP-MCNC: NEGATIVE MG/DL
LEUKOCYTE ESTERASE UR QL STRIP: ABNORMAL
MUCOUS THREADS #/AREA URNS LPF: PRESENT /LPF
NITRATE UR QL: NEGATIVE
PH UR STRIP: 5.5 [PH] (ref 5–8)
RBC #/AREA URNS AUTO: ABNORMAL /HPF
SP GR UR STRIP: >=1.03 (ref 1–1.03)
SQUAMOUS #/AREA URNS AUTO: ABNORMAL /LPF
UROBILINOGEN UR STRIP-ACNC: 0.2 E.U./DL
WBC #/AREA URNS AUTO: ABNORMAL /HPF

## 2024-09-16 PROCEDURE — 81001 URINALYSIS AUTO W/SCOPE: CPT | Performed by: PHYSICIAN ASSISTANT

## 2024-09-16 PROCEDURE — 87086 URINE CULTURE/COLONY COUNT: CPT | Performed by: PHYSICIAN ASSISTANT

## 2024-09-16 PROCEDURE — 81025 URINE PREGNANCY TEST: CPT | Performed by: PHYSICIAN ASSISTANT

## 2024-09-16 PROCEDURE — 87186 SC STD MICRODIL/AGAR DIL: CPT | Performed by: PHYSICIAN ASSISTANT

## 2024-09-16 PROCEDURE — 99213 OFFICE O/P EST LOW 20 MIN: CPT | Performed by: PHYSICIAN ASSISTANT

## 2024-09-16 RX ORDER — NITROFURANTOIN 25; 75 MG/1; MG/1
100 CAPSULE ORAL 2 TIMES DAILY
Qty: 10 CAPSULE | Refills: 0 | Status: SHIPPED | OUTPATIENT
Start: 2024-09-16 | End: 2024-09-21

## 2024-09-16 RX ORDER — VONOPRAZAN FUMARATE 26.72 MG/1
1 TABLET ORAL
COMMUNITY
Start: 2024-09-07

## 2024-09-16 RX ORDER — PHENAZOPYRIDINE HYDROCHLORIDE 95 MG/1
190 TABLET ORAL 3 TIMES DAILY
Qty: 18 TABLET | Refills: 0 | Status: SHIPPED | OUTPATIENT
Start: 2024-09-16 | End: 2024-09-19

## 2024-09-16 RX ORDER — FLUCONAZOLE 150 MG/1
150 TABLET ORAL
Qty: 3 TABLET | Refills: 0 | Status: SHIPPED | OUTPATIENT
Start: 2024-09-16 | End: 2024-09-23

## 2024-09-16 ASSESSMENT — ENCOUNTER SYMPTOMS
COUGH: 0
ABDOMINAL PAIN: 0
BACK PAIN: 1
COLOR CHANGE: 0
DYSURIA: 1
EYE PAIN: 0
SHORTNESS OF BREATH: 0
SORE THROAT: 0
CHILLS: 0
VOMITING: 0
HEMATURIA: 0
SEIZURES: 0
FEVER: 0
FREQUENCY: 1
FLANK PAIN: 0
PALPITATIONS: 0
ARTHRALGIAS: 0

## 2024-09-16 ASSESSMENT — PATIENT HEALTH QUESTIONNAIRE - PHQ9
SUM OF ALL RESPONSES TO PHQ QUESTIONS 1-9: 1
SUM OF ALL RESPONSES TO PHQ QUESTIONS 1-9: 1
10. IF YOU CHECKED OFF ANY PROBLEMS, HOW DIFFICULT HAVE THESE PROBLEMS MADE IT FOR YOU TO DO YOUR WORK, TAKE CARE OF THINGS AT HOME, OR GET ALONG WITH OTHER PEOPLE: NOT DIFFICULT AT ALL

## 2024-09-16 NOTE — PROGRESS NOTES
Assessment & Plan     Dysuria  Urinary tract infection with hematuria, site unspecified  Urine sample done today shows 10-25 white blood cells, 5-10 red blood cells, negative nitrate.  Symptoms include urinary frequency, urgency and dysuria.  Normal pelvic pain or discharge.  No fevers or chills.  On exam she does have some mild pain over the suprapubic region.  No CVA tenderness.  I do not suspect kidney stone.  No new sexual partners.  Offered to do STI testing but she has not had a new partner.  Based on urinalysis suspect a urinary tract infection.  Last UTI was 5/20/2020 for E. coli pansensitive.  If symptoms do not improve over the next few days recommended STI testing.  Will start her Macrobid x 5 days, Diflucan as she typically gets yeast infection with antibiotics, and Azo x 3 days  - UA Macroscopic with reflex to Microscopic and Culture - Lab Collect; Future  - UA Macroscopic with reflex to Microscopic and Culture - Lab Collect  - Urine Microscopic Exam  - Urine Culture  - nitroFURantoin macrocrystal-monohydrate (MACROBID) 100 MG capsule; Take 1 capsule (100 mg) by mouth 2 times daily for 5 days.  - fluconazole (DIFLUCAN) 150 MG tablet; Take 1 tablet (150 mg) by mouth every 3 days for 3 doses.  - phenazopyridine (AZO URINARY PAIN RELIEF) 95 MG tablet; Take 2 tablets (190 mg) by mouth 3 times daily for 3 days.          Subjective   Jodie is a 23 year old, presenting for the following health issues:  UTI (Possible UTI)        9/16/2024     9:26 AM   Additional Questions   Roomed by Manisha Borjas   Accompanied by caleb     Jodie is a 23-year-old female who presents to the clinic today for urinary frequency, urgency and suprapubic pain.  Symptoms started this morning and she woke up.  No fevers or chills.  No worsening back pain than normal.  She states that she has had multiple urinary tract infections in the past and this seems very similar.  She denies any vaginal pain, itching or discharge.  She is  "currently menstruating.  She is also interested in doing urine pregnancy test that it is possible that she is pregnant.    History of Present Illness       Reason for visit:  UTI x 24hrs  Symptom onset:  Today  Symptoms include:  Frequency and urgency with burning  Symptom intensity:  Severe  Symptom progression:  Worsening  Had these symptoms before:  No  What makes it worse:  Nothing  What makes it better:  Nothing   She is taking medications regularly.         Review of Systems   Constitutional:  Negative for chills and fever.   HENT:  Negative for ear pain and sore throat.    Eyes:  Negative for pain and visual disturbance.   Respiratory:  Negative for cough and shortness of breath.    Cardiovascular:  Negative for chest pain and palpitations.   Gastrointestinal:  Negative for abdominal pain and vomiting.   Genitourinary:  Positive for dysuria, frequency and urgency. Negative for decreased urine volume, flank pain, hematuria, menstrual problem, pelvic pain, vaginal bleeding, vaginal discharge and vaginal pain.   Musculoskeletal:  Positive for back pain (chronic). Negative for arthralgias.   Skin:  Negative for color change and rash.   Neurological:  Negative for seizures and syncope.   All other systems reviewed and are negative.          Objective    /68 (BP Location: Right arm, Patient Position: Sitting, Cuff Size: Adult Regular)   Pulse 96   Temp 98.8  F (37.1  C) (Oral)   Resp 22   Ht 1.651 m (5' 5\")   Wt 93.9 kg (207 lb)   LMP 09/10/2024 (Exact Date)   SpO2 99%   Breastfeeding No   BMI 34.45 kg/m    Body mass index is 34.45 kg/m .  Physical Exam  Vitals and nursing note reviewed.   Constitutional:       General: She is not in acute distress.     Appearance: Normal appearance. She is not ill-appearing, toxic-appearing or diaphoretic.   Cardiovascular:      Rate and Rhythm: Normal rate and regular rhythm.      Heart sounds: No murmur heard.     No friction rub. No gallop.   Pulmonary:      " Effort: Pulmonary effort is normal.      Breath sounds: No wheezing, rhonchi or rales.   Abdominal:      General: Abdomen is flat.      Palpations: Abdomen is soft.      Tenderness: There is abdominal tenderness in the suprapubic area. There is no right CVA tenderness, left CVA tenderness, guarding or rebound.   Neurological:      Mental Status: She is alert.   Psychiatric:         Mood and Affect: Mood normal.         Behavior: Behavior normal.         Thought Content: Thought content normal.         Judgment: Judgment normal.                  Signed Electronically by: Aly Teixeira PA-C

## 2024-09-17 LAB — BACTERIA UR CULT: ABNORMAL

## 2024-09-19 ENCOUNTER — MYC MEDICAL ADVICE (OUTPATIENT)
Dept: FAMILY MEDICINE | Facility: CLINIC | Age: 24
End: 2024-09-19
Payer: COMMERCIAL

## 2024-09-20 ENCOUNTER — NURSE TRIAGE (OUTPATIENT)
Dept: FAMILY MEDICINE | Facility: CLINIC | Age: 24
End: 2024-09-20
Payer: COMMERCIAL

## 2024-09-20 ENCOUNTER — PATIENT OUTREACH (OUTPATIENT)
Dept: CARE COORDINATION | Facility: CLINIC | Age: 24
End: 2024-09-20
Payer: COMMERCIAL

## 2024-09-20 DIAGNOSIS — L70.8 OTHER ACNE: ICD-10-CM

## 2024-09-20 RX ORDER — CLINDAMYCIN AND BENZOYL PEROXIDE 10; 50 MG/G; MG/G
GEL TOPICAL DAILY
Qty: 100 G | Refills: 3 | Status: SHIPPED | OUTPATIENT
Start: 2024-09-20

## 2024-09-20 NOTE — TELEPHONE ENCOUNTER
Urine culture was susceptible to Macrobid.  I would recommend finishing the course of Macrobid and Diflucan.  If symptoms are not better would recommend repeat urinalysis next week and she is to let me know.  If symptoms worsen over the weekend she should be seen in urgent care for evaluation to rule out other etiologies that could be contributing to her symptoms as the Macrobid should cover her UTI.

## 2024-09-20 NOTE — TELEPHONE ENCOUNTER
Nurse Triage SBAR    Is this a 2nd Level Triage? YES, LICENSED PRACTITIONER REVIEW IS REQUIRED    Situation: UTI on antibiotics    Background: Patient still has frequency, is not done with antibiotics yet    Assessment: Patient wrote in for advice for continued frequency with a UTI. Her burning sensation has almost completely resolves, only being present at the initial urinary push. She however still has this great frequency where she feels as if she needs to urinate, but she doesn't actually need to. She has 3 days of her fluconazole remaining and 1 day of macrobid, but she doesn't want to go into the weekend without a plan. She states she was told to write in if needed for another prescription. She also states that her last UTI didn't get ultimately resolved until the last day of her antibiotic course of fluconazole.    Protocol Recommended Disposition:   No disposition on file.    Recommendation: Home care, patient would like provider to review information for potential update.      Routed to provider    Does the patient meet one of the following criteria for ADS visit consideration? 16+ years old, with an [facility] PCP     TIP  Providers, please consider if this condition is appropriate for management at one of our Acute and Diagnostic Services sites.     If patient is a good candidate, please use dotphrase <dot>triageresponse and select Refer to ADS to document.  Reason for Disposition   Reasonable improvement on antibiotics and no fever    Additional Information   Negative: Shock suspected (e.g., cold/pale/clammy skin, too weak to stand, low BP, rapid pulse)   Negative: Sounds like a life-threatening emergency to the triager   Negative: Urinary tract infection suspected, but not taking antibiotics   Negative: Unable to urinate (or only a few drops) > 4 hours and bladder feels very full (e.g., palpable bladder or strong urge to urinate)   Negative: Passing pure blood or large blood clots (i.e., size > a dime)   (Exceptions: Flecks, small strands, or pinkish-red color.)   Negative: Fever > 103 F (39.4 C)   Negative: Patient sounds very sick or weak to the triager   Negative: SEVERE pain (e.g., excruciating) and no improvement 2 hours after pain medications   Negative: Fever > 100.0 F (37.8 C) and new-onset since starting antibiotics   Negative: Side (flank) or lower back pain and new-onset since starting antibiotics   Negative: Taking antibiotic > 24 hours for UTI and flank or lower back pain getting worse   Negative: Vomiting 2 or more times and interferes with taking oral antibiotic   Negative: Taking antibiotic > 24 hours for UTI (urinary tract or bladder infection) and fever persists (still has fever)   Negative: Taking antibiotic > 72 hours (3 days) for UTI and flank or lower back pain is SAME (unchanged, not better)   Negative: Taking antibiotic > 72 hours (3 days) for UTI and painful urination or frequency is SAME (unchanged, not better)   Negative: Patient wants to be seen   Negative: Diabetes mellitus or weak immune system (e.g., HIV positive, cancer chemo, splenectomy, organ transplant, chronic steroids)   Negative: Sickle cell disease   Negative: Taking antibiotic < 24 hours for UTI and fever persists (still has fever)   Negative: Taking antibiotic < 72 hours (3 days) for UTI and painful urination or frequency is SAME (unchanged, not better)   Negative: Taking antibiotic < 72 hours (3 days) for UTI and flank or lower back pain is SAME (unchanged, not better)    Protocols used: Urinary Tract Infection on Antibiotic Follow-up Call - Female-A-[place]    Blanca Ramos RN  [facility]

## 2024-09-20 NOTE — TELEPHONE ENCOUNTER
Spoke with patient to relay provider message. She verbalized understanding.    Blanca Ramos RN  [facility]

## 2024-09-23 ENCOUNTER — LAB (OUTPATIENT)
Dept: LAB | Facility: CLINIC | Age: 24
End: 2024-09-23
Payer: COMMERCIAL

## 2024-09-23 ENCOUNTER — MYC MEDICAL ADVICE (OUTPATIENT)
Dept: FAMILY MEDICINE | Facility: CLINIC | Age: 24
End: 2024-09-23

## 2024-09-23 ENCOUNTER — VIRTUAL VISIT (OUTPATIENT)
Dept: FAMILY MEDICINE | Facility: CLINIC | Age: 24
End: 2024-09-23
Payer: COMMERCIAL

## 2024-09-23 DIAGNOSIS — N92.1 BREAKTHROUGH BLEEDING ON BIRTH CONTROL PILLS: ICD-10-CM

## 2024-09-23 DIAGNOSIS — N94.9 VAGINAL SYMPTOM: ICD-10-CM

## 2024-09-23 DIAGNOSIS — R39.9 URINARY TRACT INFECTION SYMPTOMS: ICD-10-CM

## 2024-09-23 DIAGNOSIS — Z30.41 SURVEILLANCE OF PREVIOUSLY PRESCRIBED CONTRACEPTIVE PILL: Primary | ICD-10-CM

## 2024-09-23 DIAGNOSIS — B96.89 BACTERIAL VAGINOSIS: Primary | ICD-10-CM

## 2024-09-23 DIAGNOSIS — N76.0 BACTERIAL VAGINOSIS: Primary | ICD-10-CM

## 2024-09-23 LAB
ALBUMIN UR-MCNC: 30 MG/DL
APPEARANCE UR: CLEAR
BACTERIA #/AREA URNS HPF: ABNORMAL /HPF
BILIRUB UR QL STRIP: NEGATIVE
CLUE CELLS: PRESENT
COLOR UR AUTO: YELLOW
GLUCOSE UR STRIP-MCNC: NEGATIVE MG/DL
HGB UR QL STRIP: NEGATIVE
KETONES UR STRIP-MCNC: NEGATIVE MG/DL
LEUKOCYTE ESTERASE UR QL STRIP: NEGATIVE
MUCOUS THREADS #/AREA URNS LPF: PRESENT /LPF
NITRATE UR QL: NEGATIVE
PH UR STRIP: 5.5 [PH] (ref 5–8)
RBC #/AREA URNS AUTO: ABNORMAL /HPF
SP GR UR STRIP: >=1.03 (ref 1–1.03)
SQUAMOUS #/AREA URNS AUTO: ABNORMAL /LPF
TRICHOMONAS, WET PREP: ABNORMAL
UROBILINOGEN UR STRIP-ACNC: 0.2 E.U./DL
WBC #/AREA URNS AUTO: ABNORMAL /HPF
WBC'S/HIGH POWER FIELD, WET PREP: ABNORMAL
YEAST, WET PREP: ABNORMAL

## 2024-09-23 PROCEDURE — 81001 URINALYSIS AUTO W/SCOPE: CPT

## 2024-09-23 PROCEDURE — 99441 PR PHYSICIAN TELEPHONE EVALUATION 5-10 MIN: CPT | Mod: 93 | Performed by: FAMILY MEDICINE

## 2024-09-23 PROCEDURE — 87210 SMEAR WET MOUNT SALINE/INK: CPT

## 2024-09-23 RX ORDER — NORGESTIMATE AND ETHINYL ESTRADIOL 0.25-0.035
1 KIT ORAL DAILY
Qty: 84 TABLET | Refills: 1 | Status: SHIPPED | OUTPATIENT
Start: 2024-09-23

## 2024-09-23 NOTE — PROGRESS NOTES
Jodie is a 23 year old who is being evaluated via a billable telephone visit.    What phone number would you like to be contacted at? [phone]   How would you like to obtain your AVS? [place]  Originating Location (pt. Location): Home    Distant Location (provider location):  Off-site    Assessment & Plan     Surveillance of previously prescribed contraceptive pill  Cont   - norgestimate-ethinyl estradiol (ORTHO-CYCLEN) 0.25-35 MG-MCG tablet; Take 1 tablet by mouth daily.    Urinary tract infection symptoms  Results for orders placed or performed in visit on 09/23/24   UA Macroscopic with reflex to Microscopic and Culture - Lab Collect     Status: Abnormal    Specimen: Urine, Clean Catch   Result Value Ref Range    Color Urine Yellow Colorless, Straw, Light Yellow, Yellow    Appearance Urine Clear Clear    Glucose Urine Negative Negative mg/dL    Bilirubin Urine Negative Negative    Ketones Urine Negative Negative mg/dL    Specific Gravity Urine >=1.030 1.005 - 1.030    Blood Urine Negative Negative    pH Urine 5.5 5.0 - 8.0    Protein Albumin Urine 30 (A) Negative mg/dL    Urobilinogen Urine 0.2 0.2, 1.0 E.U./dL    Nitrite Urine Negative Negative    Leukocyte Esterase Urine Negative Negative   UA Microscopic with Reflex to Culture     Status: Abnormal   Result Value Ref Range    Bacteria Urine Moderate (A) None Seen /HPF    RBC Urine 0-2 0-2 /HPF /HPF    WBC Urine 5-10 (A) 0-5 /HPF /HPF    Squamous Epithelials Urine Many (A) None Seen /LPF    Mucus Urine Present (A) None Seen /LPF    Narrative    Urine Culture not indicated   Wet prep - lab collect     Status: Abnormal    Specimen: Vagina; Swab   Result Value Ref Range    Trichomonas Absent Absent    Yeast Absent Absent    Clue Cells Present (A) Absent    WBCs/high power field 2+ (A) None       - UA Macroscopic with reflex to Microscopic and Culture - Lab Collect; Future    Vaginal symptom  B vaginitis   - Wet prep - lab collect; Future    Breakthrough  "bleeding on birth control pills  Increase the estrogen to hopefully stabilize the endometrium  - norgestimate-ethinyl estradiol (ORTHO-CYCLEN) 0.25-35 MG-MCG tablet; Take 1 tablet by mouth daily.          BMI  Estimated body mass index is 34.26 kg/m  as calculated from the following:    Height as of 10/4/24: 1.651 m (5' 5\").    Weight as of 10/4/24: 93.4 kg (205 lb 14.4 oz).         FUTURE APPOINTMENTS:       - Follow-up for annual visit or as needed    Subjective   Jodie is a 23 year old, presenting for the following health issues:  Recheck Medication    HPI     Bleeding for 2-3 prior to placebo week.   Starting to bleed on week 2 instead of week 4 week.   For the past couple months.   Had a UTI last week, completed the antibiotics. Stilling having the feeling of needing to go, even after urinating.   Ynes Mohamud CMA      As above no vag discharge but still uncomfortable       Review of Systems  Constitutional, HEENT, cardiovascular, pulmonary, gi and gu systems are negative, except as otherwise noted.      Objective           Vitals:  No vitals were obtained today due to virtual visit.    Physical Exam   General: Alert and no distress //Respiratory: No audible wheeze, cough, or shortness of breath // Psychiatric:  Appropriate affect, tone, and pace of words            Phone call duration: 10 minutes spent on the day of service with chart review, telephone call, counseling, and charting. Anabel Silver M.D.   minutes  Signed Electronically by: Anabel Silver MD    "

## 2024-09-24 RX ORDER — METRONIDAZOLE 500 MG/1
500 TABLET ORAL 2 TIMES DAILY
Qty: 14 TABLET | Refills: 0 | Status: SHIPPED | OUTPATIENT
Start: 2024-09-24 | End: 2024-10-01

## 2024-10-02 ENCOUNTER — TELEPHONE (OUTPATIENT)
Dept: FAMILY MEDICINE | Facility: CLINIC | Age: 24
End: 2024-10-02

## 2024-10-02 NOTE — TELEPHONE ENCOUNTER
Pt states that she was recently treated for both a UTI and BV. Says that she took her last dose of metronidazole for the BV today. Says that symptoms have improved, but continues to notice vaginal discharge, painful urination, and feels like she needs to urinate again immediately after she's just voided. Pt inquiring if she can have lab orders placed to follow up on her symptoms, writer advised that she'll need a follow up visit with a provider. Pt is agreeable with this plan, telephone visit scheduled for this afternoon.    Manisha Zuleta RN  [facility]

## 2024-10-04 ENCOUNTER — OFFICE VISIT (OUTPATIENT)
Dept: FAMILY MEDICINE | Facility: CLINIC | Age: 24
End: 2024-10-04
Payer: COMMERCIAL

## 2024-10-04 VITALS
BODY MASS INDEX: 34.3 KG/M2 | WEIGHT: 205.9 LBS | HEIGHT: 65 IN | HEART RATE: 86 BPM | DIASTOLIC BLOOD PRESSURE: 80 MMHG | TEMPERATURE: 98.5 F | SYSTOLIC BLOOD PRESSURE: 110 MMHG | RESPIRATION RATE: 24 BRPM | OXYGEN SATURATION: 98 %

## 2024-10-04 DIAGNOSIS — B37.0 THRUSH: ICD-10-CM

## 2024-10-04 DIAGNOSIS — R39.9 SYMPTOMS INVOLVING URINARY SYSTEM: Primary | ICD-10-CM

## 2024-10-04 LAB
ALBUMIN UR-MCNC: NEGATIVE MG/DL
APPEARANCE UR: ABNORMAL
BACTERIA #/AREA URNS HPF: ABNORMAL /HPF
BILIRUB UR QL STRIP: NEGATIVE
CLUE CELLS: ABNORMAL
COLOR UR AUTO: YELLOW
GLUCOSE UR STRIP-MCNC: NEGATIVE MG/DL
HCG UR QL: NEGATIVE
HGB UR QL STRIP: NEGATIVE
KETONES UR STRIP-MCNC: ABNORMAL MG/DL
LEUKOCYTE ESTERASE UR QL STRIP: ABNORMAL
MUCOUS THREADS #/AREA URNS LPF: PRESENT /LPF
NITRATE UR QL: NEGATIVE
PH UR STRIP: 7 [PH] (ref 5–7)
RBC #/AREA URNS AUTO: ABNORMAL /HPF
SP GR UR STRIP: 1.02 (ref 1–1.03)
SQUAMOUS #/AREA URNS AUTO: ABNORMAL /LPF
TRICHOMONAS, WET PREP: ABNORMAL
UROBILINOGEN UR STRIP-ACNC: 0.2 E.U./DL
WBC #/AREA URNS AUTO: ABNORMAL /HPF
WBC'S/HIGH POWER FIELD, WET PREP: ABNORMAL
YEAST, WET PREP: ABNORMAL

## 2024-10-04 PROCEDURE — 81025 URINE PREGNANCY TEST: CPT | Performed by: NURSE PRACTITIONER

## 2024-10-04 PROCEDURE — 99213 OFFICE O/P EST LOW 20 MIN: CPT | Performed by: NURSE PRACTITIONER

## 2024-10-04 PROCEDURE — 87086 URINE CULTURE/COLONY COUNT: CPT | Performed by: NURSE PRACTITIONER

## 2024-10-04 PROCEDURE — 87210 SMEAR WET MOUNT SALINE/INK: CPT | Performed by: NURSE PRACTITIONER

## 2024-10-04 PROCEDURE — 81001 URINALYSIS AUTO W/SCOPE: CPT | Performed by: NURSE PRACTITIONER

## 2024-10-04 RX ORDER — FLUCONAZOLE 100 MG/1
100 TABLET ORAL DAILY
Qty: 15 TABLET | Refills: 0 | Status: SHIPPED | OUTPATIENT
Start: 2024-10-04 | End: 2024-10-19

## 2024-10-04 ASSESSMENT — PAIN SCALES - GENERAL: PAINLEVEL: NO PAIN (0)

## 2024-10-04 NOTE — PROGRESS NOTES
Assessment & Plan     Symptoms involving urinary system  Symptoms resolved.  Micro showing 5-10 WBCs.  Will order urine culture to ensure infection has been treated.  Wet prep was negative.  - UA Macroscopic with reflex to Microscopic and Culture - Clinic Collect  - Wet prep - Clinic Collect  - UA Microscopic with Reflex to Culture  - HCG Qual, Urine ([id number]); Future  - HCG Qual, Urine ([id number])  - Urine Culture Aerobic Bacterial - lab collect; Future  - Urine Culture Aerobic Bacterial - lab collect    Thrush  Likely from recent antibiotics.  Treat with:  - fluconazole (DIFLUCAN) 100 MG tablet; Take 1 tablet (100 mg) by mouth daily for 15 days.    The risks, benefits and treatment options of prescribed medications or other treatments have been discussed with the patient. The patient verbalized their understanding and should call or follow up if no improvement or if they develop further problems.  Indira Brunfelt, CNP            Subjective   Jodie is a 24 year old, presenting for the following health issues:  Urinary Problem (/), Vaginal Problem, and Health Maintenance (Reminded due for pappe/pe, declined vaccines )        10/4/2024     8:21 AM   Additional Questions   Roomed by Talat RAMIREZ CMA   Accompanied by self     Vaginal Problem   Frequency: urine preg.   History of Present Illness       Reason for visit:  Uti or bacteria vaginosis    She eats 2-3 servings of fruits and vegetables daily.She consumes 1 sweetened beverage(s) daily.She exercises with enough effort to increase her heart rate 30 to 60 minutes per day.  She exercises with enough effort to increase her heart rate 4 days per week. She is missing 1 dose(s) of medications per week.  She is not taking prescribed medications regularly due to remembering to take.         Genitourinary - Female  Onset/Duration: ongoing   Description:   Painful urination (Dysuria): No           Frequency: No  Blood in urine (Hematuria): No  Delay in urine (Hesitency):  "No  Intensity: mild  Progression of Symptoms:  improving  Accompanying Signs & Symptoms:  Fever/chills: No  Flank pain: No  Nausea and vomiting: No  Vaginal symptoms: discharge and odor  Abdominal/Pelvic Pain: No  History:   History of frequent UTI s: YES, just finished treatment for UTI and BV , wants to make sure symptoms are gone  History of kidney stones: No  Sexually Active: YES  Possibility of pregnancy: Yes  Precipitating or alleviating factors: None  Therapies tried and outcome:  was on 2 meds for BV and UTI recently      Patient reports that all of her urinary symptoms resolved with the Macrobid.  She would like to make sure the infection is gone.  She currently is concerned about vaginal discharge that seems different than her normal.  She completed metronidazole for BV.  She is in a monogamous relationship.  Was tested for STDs since being with her current partner.  There is been no new partners since then and she has no concerns for STDs.      Concern - White Tongue   Onset: x 1 week   Description: white film on tongue   Intensity: mild  Progression of Symptoms:  same  Accompanying Signs & Symptoms: recently had uti and bv   Previous history of similar problem: none   Precipitating factors:        Worsened by: none   Alleviating factors:        Improved by: none   Therapies tried and outcome:  none       Review of Systems  Constitutional, HEENT, cardiovascular, pulmonary, gi and gu systems are negative, except as otherwise noted.      Objective    /80 (BP Location: Right arm, Patient Position: Sitting, Cuff Size: Adult Regular)   Pulse 86   Temp 98.5  F (36.9  C) (Tympanic)   Resp 24   Ht 1.651 m (5' 5\")   Wt 93.4 kg (205 lb 14.4 oz)   LMP 09/10/2024 (Exact Date)   SpO2 98%   BMI 34.26 kg/m    Body mass index is 34.26 kg/m .  Physical Exam   GENERAL: alert and no distress  HENT: white coating on tongue  RESP: lungs clear to auscultation - no rales, rhonchi or wheezes  CV: regular rate and " rhythm, normal S1 S2, no S3 or S4, no murmur, click or rub, no peripheral edema   ABDOMEN: soft, nontender, no hepatosplenomegaly, no masses and bowel sounds normal            Signed Electronically by: ERMELINDA Perez CNP

## 2024-10-04 NOTE — PROGRESS NOTES
Chief concern (CC): Urinary symptoms       Subjective    History of Present Illness (HPI):      #1 Follow-up for urinary symptoms now RESOLVED  Last urinary symptoms started on 9/16/2024. At that time, symptoms included hesitancy (constantly feeling like she has to go to the bathroom) and burning sensation. Denied hematuria.  Patient was treated with Macrobid and Azo.   These symptoms have now resolved.    At today's visit, patient reported experiencing sticky, white discharge, with strange odor (new).  No urgency, pain or hematuria.   No fever, chills, vaginal symptoms, abdominal or flank pain.  Would like to know if the infection has resolved.     #2 White coating on tongue  White coating present on the tongue.  No pain or irritation.  Current smoker.     Past Medical History (PMH)    Related hx.  Hx of recurrent UTI, yeast infections, & bacterial vaginosis since patient was a kid.     Accident/Injuries/Surgeries/Hospitalizations:   Patient Active Problem List   Diagnosis    Recurrent UTI    Weight disorder    Adjustment disorder with mixed anxiety and depressed mood    Obesity    Depression with anxiety    Gastroesophageal reflux disease, esophagitis presence not specified    Increased insulin level    Nexplanon insertion    Chronic migraine without aura without status migrainosus, not intractable    Breakthrough bleeding on Nexplanon    Endometrial thickening on ultrasound    BMI 40.0-44.9, adult (H)    Encounter for intrauterine device placement        Past Surgical History:   Procedure Laterality Date    ARTHROSCOPY ANKLE Right 1/26/2017    Procedure: ARTHROSCOPY ANKLE;  Surgeon: Davis Mayfield DPM;  Location: [facility]    DILATION AND CURETTAGE, HYSTEROSCOPY DIAGNOSTIC, COMBINED N/A 7/19/2021    Procedure: HYSTEROSCOPY, DIAGNOSTIC, WITH DILATION AND CURETTAGE OF UTERUS;  Surgeon: Ryan Salmeron MD;  Location: [facility]    NO HISTORY OF SURGERY      TONSILLECTOMY, ADENOIDECTOMY, COMBINED Bilateral  5/13/2022    Procedure: TONSILLECTOMY  and ADENOIDECTOMY;  Surgeon: Zach Cross MD;  Location: [facility]       Allergies & reactions:  No known allergies    Psychiatric Illness:  Depression, anxiety, adjustment disorder.     OB/GYN:   Age of menses onset/cessation - 14  Cycle length (regular/irregular) - irregular  Number of days of bleeding and # of heavy/moderate/light days - varies about 5 days, moderate  Associated symptoms (e.g., dysmenorrhea, PMS) - mild cramps    Sexual history  Currently sexually active - yes  Contraception method (currently/past-IUD, combination oral contraception pill) - pill   History of sexually transmitted infections (when and management) - no  History of abnormal pap smear history (when and management) - no hx of pap smear  Current number of sexual partners - 1  Number of sexual partners in lifetime - 8  Sexual preferences - male  Last time of sex: 5 days ago  Method of protection: unprotected  No sexual concerns    Pregnancy  no     Medications:   Current Outpatient Medications   Medication Sig Dispense Refill    cholecalciferol (VITAMIN D3) 50 MCG (2000 UT) CAPS Take 1 capsule by mouth daily 90 capsule 3    clindamycin-benzoyl peroxide (BENZACLIN) 1-5 % external gel Apply topically daily To face, legs, underarms and buttocks once daily 100 g 3    clotrimazole (LOTRIMIN) 1 % external cream Apply topically 2 times daily 30 g 1    famotidine (PEPCID) 20 MG tablet Take 20 mg by mouth daily      fluconazole (DIFLUCAN) 150 MG tablet take one tablet every 72 hours 3 tablet 0    guaiFENesin (MUCINEX MAXIMUM STRENGTH) 1200 MG TB12 Take 1 tablet (1,200 mg) by mouth 2 times daily 60 tablet 2    guaiFENesin-codeine (ROBITUSSIN AC) 100-10 MG/5ML solution Take 5-10 mLs by mouth every 4 hours as needed for cough 180 mL 0    hydrOXYzine (ATARAX) 25 MG tablet Take 1-2 tablets (25-50 mg) by mouth every 6 hours as needed for anxiety 60 tablet 1    norgestimate-ethinyl estradiol (ORTHO-CYCLEN)  "0.25-35 MG-MCG tablet Take 1 tablet by mouth daily. 84 tablet 1    ondansetron (ZOFRAN ODT) 8 MG ODT tab Take 1 tablet (8 mg) by mouth every 8 hours as needed for nausea 20 tablet 1    pantoprazole (PROTONIX) 40 MG EC tablet Take 1 tablet (40 mg) by mouth daily 90 tablet 3    phentermine 15 MG capsule Take 1 capsule (15 mg) by mouth every morning 90 capsule 2    promethazine (PHENERGAN) 25 MG tablet Take 1 tablet (25 mg) by mouth every 8 hours as needed for nausea 60 tablet 1    propranolol (INDERAL) 10 MG tablet Take 1-3 tablets (10-30 mg) by mouth 3 times daily as needed (anxiety) 90 tablet 1    topiramate (TOPAMAX) 50 MG tablet Take 1 tablet (50 mg) by mouth daily At bedtime 90 tablet 1    traZODone (DESYREL) 100 MG tablet Take 2 tablets (200 mg) by mouth at bedtime 60 tablet 2    traZODone (DESYREL) 150 MG tablet Take 1 tablet (150 mg) by mouth At Bedtime 90 tablet 0    valACYclovir (VALTREX) 1000 mg tablet Take 1 tablet (1,000 mg) by mouth 2 times daily For 1 day at onset cold sore 8 tablet 3    valACYclovir (VALTREX) 500 MG tablet Take 1 tablet (500 mg) by mouth daily 90 tablet 1    VOQUEZNA 20 MG TABS Take 1 tablet by mouth daily at 2 pm.       No current facility-administered medications for this visit.         Habits     Nutrition: Reports drinking \"decent amount of water\".    Alcohol, drugs, smoking: reports smoking \"off and on\".     Review of Systems (ROS):    Constitutional: Negative for changes in weight, fever, fatigue or night-sweats.  HEENT: Positive for white coating on tongue. Negative for vision change, headache,   congestion, sore throat or otalgia.  CV: Negative for chest pain, palpitations or edema.  RESP: Negative for shortness of breath, wheezing, or cough.  GI: Negative for nausea, vomiting, constipation, diarrhea or abdominal pain.  : Positive for vaginal discharge. Negative for urinary frequency, urgency, hesitancy, or hematuria.   MSK: muscle weakness, pain, movement difficulties, or " "joint stiffness.  Skin, hair nails: Denies any rash, wounds, lesions, flaking or tenderness.  NEURO: Negative for weakness, confusion, numbness or dizziness.  Psych: Denies any depressive or suicidal thoughts.          Objective    Vitals /80 (BP Location: Right arm, Patient Position: Sitting, Cuff Size: Adult Regular)   Pulse 86   Temp 98.5  F (36.9  C) (Tympanic)   Resp 24   Ht 1.651 m (5' 5\")   Wt 93.4 kg (205 lb 14.4 oz)   LMP 09/10/2024 (Exact Date)   SpO2 98%   BMI 34.26 kg/m       General: Alert, calm, well-appearing, in no apparent acute distress.    HEENT: White plaques present on tongue.  Respiratory: Breath sounds clear to auscultation bilaterally without wheezes, crackles or rhonchi.  Cardiovascular: Heart rhythm and rate regular. No ectopy, murmurs, clicks or rubs noted. Radial & pedal pulses 2+.    Abdomen: Abdomen soft, flat, nontender, active bowel sounds.     Neurological: Cranial nerves II-XII grossly intact.       Assessment & Plan       #1 Urinary symptoms/recurrent UTI  UA negative for infection.  Wet prep negative for Trichomonas, yeast, clue cells. Minimal presence of WBC (2+). hCG Urine Qualitative results negative.    Plan: Urine Culture Aerobic Bacterial to detect presence of causative organisms, if any.    #2 White coating on tongue  Likely oropharyngeal candidiasis.    Plan: Start fluconazole (DIFLUCAN) 100 MG tablet. Take one tablet daily for 15 days.  Follow up if no improvement.      Health Maintenance:   Patient is due for PAP, Tdap/Td, flu & Covid. Declined at this visit. Will follow-up in future visit.    Patient education  Prevention strategies: Drink plenty of fluids, maintain good hygiene- wipe front to back, postcoital voiding.  Maintain a well balanced diet and exercise regularly.    Follow-up  Follow up with patient to determine plan of care once urine culture results are back.        Signed: ELIUD Reynoso student     "

## 2024-10-05 LAB — BACTERIA UR CULT: NORMAL

## 2024-11-06 DIAGNOSIS — E66.813 CLASS 3 OBESITY: ICD-10-CM

## 2024-11-06 RX ORDER — PHENTERMINE HYDROCHLORIDE 15 MG/1
15 CAPSULE ORAL EVERY MORNING
Qty: 90 CAPSULE | Refills: 1 | Status: SHIPPED | OUTPATIENT
Start: 2024-11-06

## 2024-12-26 ENCOUNTER — OFFICE VISIT (OUTPATIENT)
Dept: FAMILY MEDICINE | Facility: CLINIC | Age: 24
End: 2024-12-26
Payer: COMMERCIAL

## 2024-12-26 VITALS
TEMPERATURE: 99.1 F | SYSTOLIC BLOOD PRESSURE: 122 MMHG | OXYGEN SATURATION: 100 % | BODY MASS INDEX: 35.82 KG/M2 | HEART RATE: 87 BPM | DIASTOLIC BLOOD PRESSURE: 80 MMHG | HEIGHT: 65 IN | WEIGHT: 215 LBS

## 2024-12-26 DIAGNOSIS — K21.9 GASTROESOPHAGEAL REFLUX DISEASE WITHOUT ESOPHAGITIS: ICD-10-CM

## 2024-12-26 DIAGNOSIS — F43.23 ADJUSTMENT DISORDER WITH MIXED ANXIETY AND DEPRESSED MOOD: ICD-10-CM

## 2024-12-26 DIAGNOSIS — N92.1 BREAKTHROUGH BLEEDING ON BIRTH CONTROL PILLS: ICD-10-CM

## 2024-12-26 DIAGNOSIS — R51.9 CHRONIC DAILY HEADACHE: ICD-10-CM

## 2024-12-26 DIAGNOSIS — B37.31 YEAST INFECTION OF THE VAGINA: ICD-10-CM

## 2024-12-26 DIAGNOSIS — T88.7XXA SIDE EFFECT OF MEDICATION: ICD-10-CM

## 2024-12-26 DIAGNOSIS — Z12.4 CERVICAL CANCER SCREENING: Primary | ICD-10-CM

## 2024-12-26 DIAGNOSIS — Z30.41 SURVEILLANCE OF PREVIOUSLY PRESCRIBED CONTRACEPTIVE PILL: ICD-10-CM

## 2024-12-26 DIAGNOSIS — B00.1 RECURRENT COLD SORES: ICD-10-CM

## 2024-12-26 DIAGNOSIS — G43.009 MIGRAINE WITHOUT AURA AND WITHOUT STATUS MIGRAINOSUS, NOT INTRACTABLE: ICD-10-CM

## 2024-12-26 DIAGNOSIS — B37.0 THRUSH: Primary | ICD-10-CM

## 2024-12-26 PROCEDURE — G0145 SCR C/V CYTO,THINLAYER,RESCR: HCPCS | Performed by: FAMILY MEDICINE

## 2024-12-26 RX ORDER — PROMETHAZINE HYDROCHLORIDE 25 MG/1
25 TABLET ORAL EVERY 8 HOURS PRN
Qty: 60 TABLET | Refills: 1 | Status: SHIPPED | OUTPATIENT
Start: 2024-12-26

## 2024-12-26 RX ORDER — NORGESTIMATE AND ETHINYL ESTRADIOL 0.25-0.035
1 KIT ORAL DAILY
Qty: 112 TABLET | Refills: 4 | Status: SHIPPED | OUTPATIENT
Start: 2024-12-26

## 2024-12-26 RX ORDER — VALACYCLOVIR HYDROCHLORIDE 500 MG/1
500 TABLET, FILM COATED ORAL DAILY
Qty: 90 TABLET | Refills: 3 | Status: SHIPPED | OUTPATIENT
Start: 2024-12-26

## 2024-12-26 RX ORDER — FLUCONAZOLE 150 MG/1
TABLET ORAL
Qty: 6 TABLET | Refills: 2 | Status: SHIPPED | OUTPATIENT
Start: 2024-12-26

## 2024-12-26 RX ORDER — NYSTATIN 100000 [USP'U]/ML
SUSPENSION ORAL 4 TIMES DAILY
Qty: 187 ML | Refills: 0 | Status: SHIPPED | OUTPATIENT
Start: 2024-12-26

## 2024-12-26 RX ORDER — ONDANSETRON 8 MG/1
8 TABLET, ORALLY DISINTEGRATING ORAL EVERY 8 HOURS PRN
Qty: 20 TABLET | Refills: 1 | Status: SHIPPED | OUTPATIENT
Start: 2024-12-26

## 2024-12-26 SDOH — HEALTH STABILITY: PHYSICAL HEALTH: ON AVERAGE, HOW MANY MINUTES DO YOU ENGAGE IN EXERCISE AT THIS LEVEL?: 60 MIN

## 2024-12-26 SDOH — HEALTH STABILITY: PHYSICAL HEALTH: ON AVERAGE, HOW MANY DAYS PER WEEK DO YOU ENGAGE IN MODERATE TO STRENUOUS EXERCISE (LIKE A BRISK WALK)?: 3 DAYS

## 2024-12-26 ASSESSMENT — SOCIAL DETERMINANTS OF HEALTH (SDOH): HOW OFTEN DO YOU GET TOGETHER WITH FRIENDS OR RELATIVES?: THREE TIMES A WEEK

## 2024-12-26 NOTE — PROGRESS NOTES
Preventive Care Visit  [facility] RUPINDER Silver MD, Family Medicine  Dec 26, 2024      Assessment & Plan     Cervical cancer screening  Done   - Pap Screen Only - Recommended Age 21 - 24 Years    Side effect of medication  Will treat   - fluconazole (DIFLUCAN) 150 MG tablet; Take 1 tab at onset and one in 3 days may repeat as necessary    Yeast infection of the vagina    - fluconazole (DIFLUCAN) 150 MG tablet; Take 1 tab at onset and one in 3 days may repeat as necessary    Surveillance of previously prescribed contraceptive pill  Continue   - norgestimate-ethinyl estradiol (ORTHO-CYCLEN) 0.25-35 MG-MCG tablet; Take 1 tablet by mouth daily. Take active pills only skip placebo for first 3 pack then at 4th pack take placebo    Breakthrough bleeding on birth control pills    - norgestimate-ethinyl estradiol (ORTHO-CYCLEN) 0.25-35 MG-MCG tablet; Take 1 tablet by mouth daily. Take active pills only skip placebo for first 3 pack then at 4th pack take placebo    Gastroesophageal reflux disease without esophagitis  Uses prn and for headache   - ondansetron (ZOFRAN ODT) 8 MG ODT tab; Take 1 tablet (8 mg) by mouth every 8 hours as needed for nausea.    Migraine without aura and without status migrainosus, not intractable  Takes these at other times   - promethazine (PHENERGAN) 25 MG tablet; Take 1 tablet (25 mg) by mouth every 8 hours as needed for nausea.    Chronic daily headache    - promethazine (PHENERGAN) 25 MG tablet; Take 1 tablet (25 mg) by mouth every 8 hours as needed for nausea.    Recurrent cold sores  Uses prn   - valACYclovir (VALTREX) 500 MG tablet; Take 1 tablet (500 mg) by mouth daily.    Adjustment disorder with mixed anxiety and depressed mood  Restart this as it worked the best for her  - sertraline (ZOLOFT) 50 MG tablet; Take 1/2 tab for 7 days then increase to 1 whole tab    Patient has been advised of split billing requirements and indicates understanding:  "Yes        BMI  Estimated body mass index is 36.05 kg/m  as calculated from the following:    Height as of this encounter: 1.645 m (5' 4.75\").    Weight as of this encounter: 97.5 kg (215 lb).       Counseling  Appropriate preventive services were addressed with this patient via screening, questionnaire, or discussion as appropriate for fall prevention, nutrition, physical activity, Tobacco-use cessation, social engagement, weight loss and cognition.  Checklist reviewing preventive services available has been given to the patient.  Reviewed patient's diet, addressing concerns and/or questions.   She is at risk for lack of exercise and has been provided with information to increase physical activity for the benefit of her well-being.       3 months     Subjective   Jodie is a 24 year old, presenting for the following:  Physical        12/26/2024     2:14 PM   Additional Questions   Roomed by CATY Sorto - to groggy the next day. Stopped taking.   Refill of diflucan  Wt Readings from Last 5 Encounters:   12/26/24 97.5 kg (215 lb)   10/04/24 93.4 kg (205 lb 14.4 oz)   09/16/24 93.9 kg (207 lb)   05/01/24 93 kg (205 lb)   04/24/24 90.7 kg (200 lb)         She has not lost weight on the phenermine the first time she took it she dies     Health Care Directive  Patient does not have a Health Care Directive: Discussed advance care planning with patient; however, patient declined at this time.      12/26/2024   General Health   How would you rate your overall physical health? (!) FAIR   Feel stress (tense, anxious, or unable to sleep) Not at all         12/26/2024   Nutrition   Three or more servings of calcium each day? (!) NO   Diet: Regular (no restrictions)   How many servings of fruit and vegetables per day? (!) 2-3   How many sweetened beverages each day? 0-1         12/26/2024   Exercise   Days per week of moderate/strenous exercise 3 days   Average minutes spent exercising at this level 60 " "min         12/26/2024   Social Factors   Frequency of gathering with friends or relatives Three times a week   Worry food won't last until get money to buy more No   Food not last or not have enough money for food? No   Do you have housing? (Housing is defined as stable permanent housing and does not include staying ouside in a car, in a tent, in an abandoned building, in an overnight shelter, or couch-surfing.) Yes   Are you worried about losing your housing? No   Lack of transportation? No   Unable to get utilities (heat,electricity)? No         12/26/2024   Dental   Dentist two times every year? Yes         12/26/2024   TB Screening   Were you born outside of the [place]? Yes         Today's PHQ-9 Score:       9/16/2024     9:27 AM   PHQ-9 SCORE   PHQ-9 Total Score MyChart 1 (Minimal depression)   PHQ-9 Total Score 1           12/26/2024   Substance Use   If I could quit smoking, I would Completely agree   I want to quit somking, worry about health affects Completely agree   Willing to make a plan to quit smoking Completely agree   Willing to cut down before quitting Completely agree   Alcohol more than 3/day or more than 7/wk No   Do you use any other substances recreationally? No     Social History     Tobacco Use    Smoking status: Some Days     Types: Cigarettes     Passive exposure: Current (Unknown)    Smokeless tobacco: Never    Tobacco comments:     Reports smoking \"off and on\"   Vaping Use    Vaping status: Former    Substances: Nicotine   Substance Use Topics    Alcohol use: No    Drug use: No           12/26/2024   STI Screening   New sexual partner(s) since last STI/HIV test? No     History of abnormal Pap smear: No - age 21-29 PAP every 3 years recommended             12/26/2024   Contraception/Family Planning   Questions about contraception or family planning No        Reviewed and updated as needed this visit by Provider                    Past Medical History:   Diagnosis Date    Migraines     " "Uncomplicated asthma          Review of Systems  Constitutional, HEENT, cardiovascular, pulmonary, gi and gu systems are negative, except as otherwise noted.     Objective    Exam  /80 (BP Location: Right arm, Patient Position: Sitting, Cuff Size: Adult Regular)   Pulse 87   Temp 99.1  F (37.3  C) (Tympanic)   Ht 1.645 m (5' 4.75\")   Wt 97.5 kg (215 lb)   SpO2 100%   BMI 36.05 kg/m     Estimated body mass index is 36.05 kg/m  as calculated from the following:    Height as of this encounter: 1.645 m (5' 4.75\").    Weight as of this encounter: 97.5 kg (215 lb).    Physical Exam  GENERAL: alert and no distress  EYES: Eyes grossly normal to inspection, PERRL and conjunctivae and sclerae normal  HENT: ear canals and TM's normal, nose and mouth without ulcers or lesions  NECK: no adenopathy, no asymmetry, masses, or scars  RESP: lungs clear to auscultation - no rales, rhonchi or wheezes  CV: regular rate and rhythm, normal S1 S2, no S3 or S4, no murmur, click or rub, no peripheral edema  ABDOMEN: soft, nontender, no hepatosplenomegaly, no masses and bowel sounds normal   (female): normal female external genitalia, normal urethral meatus, normal vaginal mucosa  MS: no gross musculoskeletal defects noted, no edema  SKIN: no suspicious lesions or rashes  NEURO: Normal strength and tone, mentation intact and speech normal  PSYCH: mentation appears normal, affect normal/bright        Signed Electronically by: Anabel Silver MD    "

## 2024-12-26 NOTE — PATIENT INSTRUCTIONS
Schedule nurse only for tdap   Try skipping fake pills for your next 3 packs then take when on pack 4

## 2024-12-31 LAB
BKR LAB AP GYN ADEQUACY: NORMAL
BKR LAB AP GYN INTERPRETATION: NORMAL
BKR LAB AP HPV REFLEX: NO
BKR LAB AP LMP: NORMAL
BKR LAB AP PREVIOUS ABNORMAL: NORMAL
PATH REPORT.COMMENTS IMP SPEC: NORMAL
PATH REPORT.COMMENTS IMP SPEC: NORMAL
PATH REPORT.RELEVANT HX SPEC: NORMAL

## 2025-01-09 ENCOUNTER — NURSE TRIAGE (OUTPATIENT)
Dept: NURSING | Facility: CLINIC | Age: 25
End: 2025-01-09
Payer: COMMERCIAL

## 2025-01-10 NOTE — TELEPHONE ENCOUNTER
Nurse Triage SBAR    Situation: Cat scratch    Background: Patient calling. Cat scratch. Scratch about 1 hour ago on the palm of the hand and wrist.     Assessment: Cleaned the wounds with soap and water. 5-6 inches each - 2 scratches. Bleeding lasted for about 1 hour - its no longer bleeding.     Protocol Recommended Disposition: Emergency Department    Recommendation: According to the protocol, Patient should go to the ED now. Advised Patient that the patient needs to go to the ED now. Care advice given. Patient verbalizes understanding but stated she would prefer to go into Urgent care tomorrow. Patient did not indicate if she will go into the ED.     Indira Prieto RN Nursing Advisor 1/9/2025 11:53 PM     Reason for Disposition   [1] Cut (length > 1/8 inch or 3 mm) or skin tear AND [2] any animal  (Exception: Superficial scratch that doesn't go through dermis.)    Additional Information   Negative: [1] Major bleeding (e.g., actively dripping or spurting) AND [2] can't be stopped   Negative: Sounds like a life-threatening emergency to the triager   Negative: Snake bite   Negative: Bite, wound, or sting from fish   Negative: [1] Any break in skin from BITE (e.g., cut, puncture or scratch) AND[2] WILD animal at risk for RABIES (e.g., bat, raccoon, herrera, skunk, coyote, other carnivores; see Background for list.)   Negative: [1] Any break in skin from BITE (e.g., cut, puncture or scratch) AND[2] PET animal (e.g., dog, cat, or ferret) at risk for RABIES (e.g., sick, stray, unprovoked bite, developing country)   Negative: [1] Any break in skin from BITE (e.g., cut, puncture or scratch) AND[2] monkey   Negative: [1] EXPOSURE of non-intact skin (e.g., exposed person has dermatitis, abrasion, wound) AND[2] with animal BODY FLUID (e.g., saliva such as licking, blood, brain) AND[3] animal at high-risk for RABIES (e.g., bat, raccoon, herrera, skunk, coyote, other carnivores)    Protocols used: Animal Bite-A-

## 2025-02-16 DIAGNOSIS — F43.23 ADJUSTMENT DISORDER WITH MIXED ANXIETY AND DEPRESSED MOOD: ICD-10-CM

## 2025-03-27 ENCOUNTER — MYC MEDICAL ADVICE (OUTPATIENT)
Dept: FAMILY MEDICINE | Facility: CLINIC | Age: 25
End: 2025-03-27
Payer: COMMERCIAL

## 2025-04-10 ENCOUNTER — MYC MEDICAL ADVICE (OUTPATIENT)
Dept: FAMILY MEDICINE | Facility: CLINIC | Age: 25
End: 2025-04-10
Payer: COMMERCIAL

## 2025-04-15 NOTE — PROGRESS NOTES
"ESTABLISHED PATIENT OUTPATIENT VISIT    Chief complaint/Reason for visit: BTB on OCPs    HPI: 23yo presents for contraception consultation due to breakthrough bleeding on OCPs.  This has been a longstanding issue for patient.  She has tried multiple forms of birth control in the past, including Nexplanon (worked well initially, then BTB), Kyleena (too painful), and multiple different OCPs.    More recent history:      Had been on OrthoTriCyclen Lo, took pill the standard way.  Reports bleeding the week before and the week of placebo.  Would get about 2 weeks without bleeding.  Switched to OrthoCyclen in 9/2024, initially took the standard way and worked initially but then BTB again.  A few months ago she was advised to try taking continuously.  She did this and felt that it helped initially but has not been having daily light bleeding/spotting for over a month.  Has not given herself a withdrawal bleed because uses the pill for contraception and did not want to compromise it.    Sexually active with one partner.    Last Pap 12/26/24 NIL.  Last G/C 5/2024 negative.  S/p Gardasil.    ------------------------------------------------------------------------  Review of Systems:     With the exception of any items noted in HPI, the remainder of the GI and  ROS is negative.    ------------------------------------------------------------------------    The medical, surgical, social and family histories were reviewed and updated.     ------------------------------------------------------------------------  Physical Exam:    Constitutional:   - /73 (BP Location: Left arm, Patient Position: Sitting, Cuff Size: Adult Regular)   Pulse 82   Temp 98.8  F (37.1  C) (Tympanic)   Resp 18   Ht 1.651 m (5' 5\")   Wt 102.8 kg (226 lb 9.6 oz)   BMI 37.71 kg/m    - General Appearance: pleasant, well-appearing, NAD    ------------------------------------------------------------------------  Labs/Studies Reviewed:     EXAM: US " "PELVIC TRANSABDOMINAL AND TRANSVAGINAL  LOCATION: [facility]  DATE/TIME: 5/13/2023 1:59 PM CDT     INDICATION: Pelvic pain after IUD placement.  Evaluate for IUD malposition.  Other acute process.  COMPARISON: None.  TECHNIQUE: Transabdominal scans were performed. Endovaginal ultrasound was performed to better visualize the adnexa.     FINDINGS:     UTERUS: 6.2 x 2.7 x 3.8 cm. Normal in size and position with no masses.     ENDOMETRIUM: 3 mm. IUD centrally position projecting within the endometrial cavity.     RIGHT OVARY: 3.2 x 2.9 x 3.5 cm. Small dominant follicle or functional ovarian cyst.     LEFT OVARY: Not seen due to overlying bowel gas.     No significant free fluid.                                                                      IMPRESSION:  1.  IUD appears appropriately position.  2.  No acute process demonstrated.    ------------------------------------------------------------------------  Assessment/Plan:  24 year old with persistent BTB on OCPs  -- Offered pelvic exam, repeat G/C testing, repeat pelvic US-- patient declines today.  She had pelvic exam in 12/2024 by PCP that was normal.  She is not due for G/C for another month.  I am OK holding off on these evaluations today.  -- Discussed that best way to resolve current episode of BTB is to allow herself a withdrawal bleed to \"reset\" endometrium.  Can then restart OCPs and repeat withdrawal bleeds as needed (not more frequently than monthly).  Advised to never take more than 7 days off pills or she will not be adequately protected from a contraception standpoint.  -- Patient has already tried/failed Nexplanon and Kyleena IUD.  Discussed Depo but not ideal due to side effects, potential for irregular bleeding.  Will continue trying different OCPs for now.  I did discuss reattempting Nexplanon, could add OCP on top of Nexplanon (off label but acceptable use).  -- Patient plans to give herself withdrawal bleed and see " how it goes with current OCP.  If she continues to be bothered by BTB, we discussed switching pills another time-- I recommended Microgestin 1.5/30 as next option.  OK for patient to send [employer] message if wishes to try this.  If fails next pill I advised returning to clinic for repeat exam, G/C testing, pelvic US to be ordered.    Kristi Geronimo MD

## 2025-04-17 ENCOUNTER — OFFICE VISIT (OUTPATIENT)
Dept: OBGYN | Facility: CLINIC | Age: 25
End: 2025-04-17
Payer: COMMERCIAL

## 2025-04-17 VITALS
WEIGHT: 226.6 LBS | BODY MASS INDEX: 37.75 KG/M2 | RESPIRATION RATE: 18 BRPM | SYSTOLIC BLOOD PRESSURE: 134 MMHG | DIASTOLIC BLOOD PRESSURE: 73 MMHG | HEART RATE: 82 BPM | HEIGHT: 65 IN | TEMPERATURE: 98.8 F

## 2025-04-17 DIAGNOSIS — N92.1 BREAKTHROUGH BLEEDING ON BIRTH CONTROL PILLS: Primary | ICD-10-CM

## 2025-04-17 RX ORDER — ESOMEPRAZOLE MAGNESIUM 40 MG/1
CAPSULE, DELAYED RELEASE ORAL
COMMUNITY
Start: 2025-02-21

## 2025-04-17 ASSESSMENT — ANXIETY QUESTIONNAIRES
7. FEELING AFRAID AS IF SOMETHING AWFUL MIGHT HAPPEN: NOT AT ALL
3. WORRYING TOO MUCH ABOUT DIFFERENT THINGS: NOT AT ALL
7. FEELING AFRAID AS IF SOMETHING AWFUL MIGHT HAPPEN: NOT AT ALL
4. TROUBLE RELAXING: NOT AT ALL
6. BECOMING EASILY ANNOYED OR IRRITABLE: NOT AT ALL
2. NOT BEING ABLE TO STOP OR CONTROL WORRYING: NOT AT ALL
5. BEING SO RESTLESS THAT IT IS HARD TO SIT STILL: NOT AT ALL
GAD7 TOTAL SCORE: 0
IF YOU CHECKED OFF ANY PROBLEMS ON THIS QUESTIONNAIRE, HOW DIFFICULT HAVE THESE PROBLEMS MADE IT FOR YOU TO DO YOUR WORK, TAKE CARE OF THINGS AT HOME, OR GET ALONG WITH OTHER PEOPLE: NOT DIFFICULT AT ALL
GAD7 TOTAL SCORE: 0
GAD7 TOTAL SCORE: 0
8. IF YOU CHECKED OFF ANY PROBLEMS, HOW DIFFICULT HAVE THESE MADE IT FOR YOU TO DO YOUR WORK, TAKE CARE OF THINGS AT HOME, OR GET ALONG WITH OTHER PEOPLE?: NOT DIFFICULT AT ALL
1. FEELING NERVOUS, ANXIOUS, OR ON EDGE: NOT AT ALL

## 2025-04-17 ASSESSMENT — PATIENT HEALTH QUESTIONNAIRE - PHQ9
SUM OF ALL RESPONSES TO PHQ QUESTIONS 1-9: 0
SUM OF ALL RESPONSES TO PHQ QUESTIONS 1-9: 0
10. IF YOU CHECKED OFF ANY PROBLEMS, HOW DIFFICULT HAVE THESE PROBLEMS MADE IT FOR YOU TO DO YOUR WORK, TAKE CARE OF THINGS AT HOME, OR GET ALONG WITH OTHER PEOPLE: NOT DIFFICULT AT ALL

## 2025-04-17 NOTE — PATIENT INSTRUCTIONS
To give yourself a withdrawal bleed (period), stop pills for 4-7 days (NOT MORE THAN 7 DAYS), then restart.    Next step: try a different pill.  OK to send [address] message for this.

## 2025-04-17 NOTE — NURSING NOTE
"Initial /73 (BP Location: Left arm, Patient Position: Sitting, Cuff Size: Adult Regular)   Pulse 82   Temp 98.8  F (37.1  C) (Tympanic)   Resp 18   Ht 1.651 m (5' 5\")   Wt 102.8 kg (226 lb 9.6 oz)   BMI 37.71 kg/m   Estimated body mass index is 37.71 kg/m  as calculated from the following:    Height as of this encounter: 1.651 m (5' 5\").    Weight as of this encounter: 102.8 kg (226 lb 9.6 oz). .    "

## 2025-05-27 DIAGNOSIS — E66.813 CLASS 3 OBESITY (H): ICD-10-CM

## 2025-05-27 RX ORDER — PHENTERMINE HYDROCHLORIDE 15 MG/1
15 CAPSULE ORAL EVERY MORNING
Qty: 90 CAPSULE | Refills: 0 | Status: SHIPPED | OUTPATIENT
Start: 2025-05-27

## 2025-05-27 NOTE — TELEPHONE ENCOUNTER
Please call patient. They are due for an office visit /follow up and possibly labs.  Refilled [id number] you . Anabel Silver M.D.

## 2025-06-23 DIAGNOSIS — B00.1 RECURRENT COLD SORES: ICD-10-CM

## 2025-06-23 RX ORDER — VALACYCLOVIR HYDROCHLORIDE 1 G/1
1000 TABLET, FILM COATED ORAL 2 TIMES DAILY
Qty: 8 TABLET | Refills: 3 | Status: SHIPPED | OUTPATIENT
Start: 2025-06-23

## 2025-08-18 DIAGNOSIS — E66.813 CLASS 3 OBESITY (H): ICD-10-CM

## 2025-08-18 RX ORDER — PHENTERMINE HYDROCHLORIDE 15 MG/1
15 CAPSULE ORAL EVERY MORNING
Qty: 30 CAPSULE | Refills: 1 | Status: SHIPPED | OUTPATIENT
Start: 2025-08-18

## (undated) DEVICE — DRSG ADAPTIC 3X8" 6113

## (undated) DEVICE — VASELINE PETROLEUM JELLY 5GM 8884433200

## (undated) DEVICE — SOL NACL 0.9% IRRIG 1000ML BOTTLE 07138-09

## (undated) DEVICE — TUBING SUCTION 12"X1/4" N612

## (undated) DEVICE — SOL NACL 0.9% IRRIG 3000ML BAG 07972-08

## (undated) DEVICE — GOWN LG DISP 9515

## (undated) DEVICE — ESU SUCTION CAUTERY 10FR FOOT CONTROL E2505-10FR

## (undated) DEVICE — BLADE KNIFE SURG 15 371115

## (undated) DEVICE — GLOVE PROTEXIS W/NEU-THERA 7.5  2D73TE75

## (undated) DEVICE — DECANTER VIAL 2006S

## (undated) DEVICE — SYR 50ML LL W/O NDL 309653

## (undated) DEVICE — ANTIFOG SOLUTION W/FOAM PAD 31142527

## (undated) DEVICE — STOCKING SLEEVE COMPRESSION CALF MED

## (undated) DEVICE — CAST PADDING 4" UNSTERILE 9044

## (undated) DEVICE — SU VICRYL 2-0 SH 27" UND J417H

## (undated) DEVICE — ESU ELEC BLADE 2.75" COATED/INSULATED E1455

## (undated) DEVICE — SOL NACL 0.9% 100ML BAG 2B1302

## (undated) DEVICE — NDL SPINAL 18GA 3.5" 405184

## (undated) DEVICE — PAD FLOOR SURGISAFE

## (undated) DEVICE — BASIN SET MINOR DISP

## (undated) DEVICE — CATH INTERMITTENT CLEAN-CATH FEMALE 14FR 6" VINYL LF 420614

## (undated) DEVICE — BLADE SHAVER ARTHRO 3.5MM FULL RADIUS C9248

## (undated) DEVICE — SPONGE TONSIL W/STRING MED

## (undated) DEVICE — Device

## (undated) DEVICE — SU FIBERWIRE 2-0 TAPER CUT AR-7220

## (undated) DEVICE — LUBRICATING JELLY 4.25OZ

## (undated) DEVICE — LABEL MEDICATION SYSTEM  3304

## (undated) DEVICE — PAD PERI INDIV WRAP 11" 2022

## (undated) DEVICE — PACK EXTREMITY LATEX FREE SOP32HFFCS

## (undated) DEVICE — BNDG ELASTIC 4" DBL LENGTH UNSTERILE 6611-14

## (undated) DEVICE — SOL WATER IRRIG 1000ML BOTTLE 07139-09

## (undated) DEVICE — SU ETHILON 3-0 PS-2 18" 1669H

## (undated) DEVICE — DRAPE EXTREMITY W/ARMBOARD 29405

## (undated) DEVICE — PACK LAPAROSCOPY/PELVISCOPY STD

## (undated) DEVICE — TUBING PUMP LINVATEC 10K150

## (undated) DEVICE — NDL 27GA 1.25" 305136

## (undated) DEVICE — SUCTION TIP YANKAUER STR K87

## (undated) DEVICE — DRSG TELFA 3X8" 1238

## (undated) DEVICE — GOWN IMPERVIOUS SPECIALTY XLG/XLONG 32474

## (undated) DEVICE — GLOVE PROTEXIS POWDER FREE 7.5 ORTHOPEDIC 2D73ET75

## (undated) DEVICE — DISTRACTOR STRAP ANKLE ARTHRO AR-1712

## (undated) DEVICE — BUR SHAVER ARTHRO 4.0MM STERLING OVAL H9101

## (undated) DEVICE — GLOVE PROTEXIS W/NEU-THERA 8.0  2D73TE80

## (undated) DEVICE — PREP CHLORAPREP 26ML TINTED ORANGE  260815

## (undated) DEVICE — DRSG GAUZE 4X4" TRAY

## (undated) DEVICE — PACK SET-UP STD 9102

## (undated) DEVICE — SPLINT FIBERGLASS 4X30" PRE-CUT RESIN 76430

## (undated) DEVICE — NDL SPINAL 22GA 3.5" QUINCKE 405181

## (undated) DEVICE — SURGICEL ABSORBABLE HEMOSTAT SNOW 2"X4" 2082

## (undated) DEVICE — GLOVE PROTEXIS POWDER FREE 8.0 ORTHOPEDIC 2D73ET80

## (undated) DEVICE — NDL 22GA 1.5"

## (undated) DEVICE — ESU PENCIL SMOKE EVAC W/ROCKER SWITCH 0703-047-000

## (undated) DEVICE — SYR EAR BULB 2OZ

## (undated) DEVICE — TUBING CYSTO/BLADDER IRRIG SET 80" 06544-01

## (undated) RX ORDER — ROPIVACAINE HYDROCHLORIDE 5 MG/ML
INJECTION, SOLUTION EPIDURAL; INFILTRATION; PERINEURAL
Status: DISPENSED
Start: 2017-01-26

## (undated) RX ORDER — FENTANYL CITRATE 50 UG/ML
INJECTION, SOLUTION INTRAMUSCULAR; INTRAVENOUS
Status: DISPENSED
Start: 2022-05-13

## (undated) RX ORDER — FENTANYL CITRATE 50 UG/ML
INJECTION, SOLUTION INTRAMUSCULAR; INTRAVENOUS
Status: DISPENSED
Start: 2017-01-26

## (undated) RX ORDER — ONDANSETRON 2 MG/ML
INJECTION INTRAMUSCULAR; INTRAVENOUS
Status: DISPENSED
Start: 2021-07-19

## (undated) RX ORDER — ACETAMINOPHEN 325 MG/1
TABLET ORAL
Status: DISPENSED
Start: 2022-05-13

## (undated) RX ORDER — HYDROMORPHONE HCL IN WATER/PF 6 MG/30 ML
PATIENT CONTROLLED ANALGESIA SYRINGE INTRAVENOUS
Status: DISPENSED
Start: 2022-05-13

## (undated) RX ORDER — LIDOCAINE HYDROCHLORIDE 10 MG/ML
INJECTION, SOLUTION EPIDURAL; INFILTRATION; INTRACAUDAL; PERINEURAL
Status: DISPENSED
Start: 2022-05-13

## (undated) RX ORDER — CEFAZOLIN SODIUM 2 G/100ML
INJECTION, SOLUTION INTRAVENOUS
Status: DISPENSED
Start: 2021-07-19

## (undated) RX ORDER — OXYCODONE HYDROCHLORIDE 5 MG/1
TABLET ORAL
Status: DISPENSED
Start: 2022-05-13

## (undated) RX ORDER — FENTANYL CITRATE 50 UG/ML
INJECTION, SOLUTION INTRAMUSCULAR; INTRAVENOUS
Status: DISPENSED
Start: 2021-07-19

## (undated) RX ORDER — LIDOCAINE HYDROCHLORIDE 10 MG/ML
INJECTION, SOLUTION EPIDURAL; INFILTRATION; INTRACAUDAL; PERINEURAL
Status: DISPENSED
Start: 2021-07-19

## (undated) RX ORDER — PROPOFOL 10 MG/ML
INJECTION, EMULSION INTRAVENOUS
Status: DISPENSED
Start: 2021-07-19

## (undated) RX ORDER — ONDANSETRON 2 MG/ML
INJECTION INTRAMUSCULAR; INTRAVENOUS
Status: DISPENSED
Start: 2022-05-13

## (undated) RX ORDER — LIDOCAINE HYDROCHLORIDE 10 MG/ML
INJECTION, SOLUTION EPIDURAL; INFILTRATION; INTRACAUDAL; PERINEURAL
Status: DISPENSED
Start: 2017-01-26

## (undated) RX ORDER — KETOROLAC TROMETHAMINE 30 MG/ML
INJECTION, SOLUTION INTRAMUSCULAR; INTRAVENOUS
Status: DISPENSED
Start: 2021-07-19

## (undated) RX ORDER — MAGNESIUM SULFATE HEPTAHYDRATE 40 MG/ML
INJECTION, SOLUTION INTRAVENOUS
Status: DISPENSED
Start: 2022-05-13

## (undated) RX ORDER — BUPIVACAINE HYDROCHLORIDE 5 MG/ML
INJECTION, SOLUTION PERINEURAL
Status: DISPENSED
Start: 2021-07-19

## (undated) RX ORDER — ACETAMINOPHEN 325 MG/1
TABLET ORAL
Status: DISPENSED
Start: 2021-07-19

## (undated) RX ORDER — MEPERIDINE HYDROCHLORIDE 25 MG/ML
INJECTION INTRAMUSCULAR; INTRAVENOUS; SUBCUTANEOUS
Status: DISPENSED
Start: 2022-05-13

## (undated) RX ORDER — PROPOFOL 10 MG/ML
INJECTION, EMULSION INTRAVENOUS
Status: DISPENSED
Start: 2017-01-26

## (undated) RX ORDER — BUPIVACAINE HYDROCHLORIDE 5 MG/ML
INJECTION, SOLUTION PERINEURAL
Status: DISPENSED
Start: 2017-01-26